# Patient Record
Sex: FEMALE | Race: WHITE | NOT HISPANIC OR LATINO | Employment: OTHER | URBAN - METROPOLITAN AREA
[De-identification: names, ages, dates, MRNs, and addresses within clinical notes are randomized per-mention and may not be internally consistent; named-entity substitution may affect disease eponyms.]

---

## 2017-01-12 ENCOUNTER — GENERIC CONVERSION - ENCOUNTER (OUTPATIENT)
Dept: OTHER | Facility: OTHER | Age: 77
End: 2017-01-12

## 2017-03-22 ENCOUNTER — ALLSCRIPTS OFFICE VISIT (OUTPATIENT)
Dept: OTHER | Facility: OTHER | Age: 77
End: 2017-03-22

## 2017-04-12 ENCOUNTER — ALLSCRIPTS OFFICE VISIT (OUTPATIENT)
Dept: OTHER | Facility: OTHER | Age: 77
End: 2017-04-12

## 2017-04-12 DIAGNOSIS — R60.9 EDEMA: ICD-10-CM

## 2017-04-12 DIAGNOSIS — I25.10 ATHEROSCLEROTIC HEART DISEASE OF NATIVE CORONARY ARTERY WITHOUT ANGINA PECTORIS: ICD-10-CM

## 2017-04-12 DIAGNOSIS — E78.5 HYPERLIPIDEMIA: ICD-10-CM

## 2017-04-12 DIAGNOSIS — I10 ESSENTIAL (PRIMARY) HYPERTENSION: ICD-10-CM

## 2017-04-12 DIAGNOSIS — E11.9 TYPE 2 DIABETES MELLITUS WITHOUT COMPLICATIONS (HCC): ICD-10-CM

## 2017-07-06 ENCOUNTER — GENERIC CONVERSION - ENCOUNTER (OUTPATIENT)
Dept: OTHER | Facility: OTHER | Age: 77
End: 2017-07-06

## 2017-10-04 ENCOUNTER — APPOINTMENT (OUTPATIENT)
Dept: LAB | Facility: CLINIC | Age: 77
End: 2017-10-04
Payer: COMMERCIAL

## 2017-10-04 ENCOUNTER — GENERIC CONVERSION - ENCOUNTER (OUTPATIENT)
Dept: OTHER | Facility: OTHER | Age: 77
End: 2017-10-04

## 2017-10-04 ENCOUNTER — TRANSCRIBE ORDERS (OUTPATIENT)
Dept: LAB | Facility: CLINIC | Age: 77
End: 2017-10-04

## 2017-10-04 DIAGNOSIS — I10 ESSENTIAL (PRIMARY) HYPERTENSION: ICD-10-CM

## 2017-10-04 DIAGNOSIS — R60.9 EDEMA: ICD-10-CM

## 2017-10-04 DIAGNOSIS — I25.10 ATHEROSCLEROTIC HEART DISEASE OF NATIVE CORONARY ARTERY WITHOUT ANGINA PECTORIS: ICD-10-CM

## 2017-10-04 DIAGNOSIS — E11.9 TYPE 2 DIABETES MELLITUS WITHOUT COMPLICATIONS (HCC): ICD-10-CM

## 2017-10-04 DIAGNOSIS — E78.5 HYPERLIPIDEMIA: ICD-10-CM

## 2017-10-04 LAB
ALBUMIN SERPL BCP-MCNC: 3.4 G/DL (ref 3.5–5)
ALP SERPL-CCNC: 67 U/L (ref 46–116)
ALT SERPL W P-5'-P-CCNC: 34 U/L (ref 12–78)
ANION GAP SERPL CALCULATED.3IONS-SCNC: 11 MMOL/L (ref 4–13)
AST SERPL W P-5'-P-CCNC: 38 U/L (ref 5–45)
BASOPHILS # BLD AUTO: 0.05 THOUSANDS/ΜL (ref 0–0.1)
BASOPHILS NFR BLD AUTO: 1 % (ref 0–1)
BILIRUB SERPL-MCNC: 0.48 MG/DL (ref 0.2–1)
BUN SERPL-MCNC: 14 MG/DL (ref 5–25)
CALCIUM SERPL-MCNC: 8.7 MG/DL (ref 8.3–10.1)
CHLORIDE SERPL-SCNC: 103 MMOL/L (ref 100–108)
CHOLEST SERPL-MCNC: 213 MG/DL (ref 50–200)
CO2 SERPL-SCNC: 24 MMOL/L (ref 21–32)
CREAT SERPL-MCNC: 0.9 MG/DL (ref 0.6–1.3)
CREAT UR-MCNC: 296 MG/DL
EOSINOPHIL # BLD AUTO: 0.36 THOUSAND/ΜL (ref 0–0.61)
EOSINOPHIL NFR BLD AUTO: 4 % (ref 0–6)
ERYTHROCYTE [DISTWIDTH] IN BLOOD BY AUTOMATED COUNT: 13.5 % (ref 11.6–15.1)
EST. AVERAGE GLUCOSE BLD GHB EST-MCNC: 183 MG/DL
GFR SERPL CREATININE-BSD FRML MDRD: 63 ML/MIN/1.73SQ M
GLUCOSE P FAST SERPL-MCNC: 135 MG/DL (ref 65–99)
HBA1C MFR BLD: 8 % (ref 4.2–6.3)
HCT VFR BLD AUTO: 39.7 % (ref 34.8–46.1)
HDLC SERPL-MCNC: 39 MG/DL (ref 40–60)
HGB BLD-MCNC: 12.7 G/DL (ref 11.5–15.4)
LDLC SERPL CALC-MCNC: 112 MG/DL (ref 0–100)
LYMPHOCYTES # BLD AUTO: 2.47 THOUSANDS/ΜL (ref 0.6–4.47)
LYMPHOCYTES NFR BLD AUTO: 30 % (ref 14–44)
MCH RBC QN AUTO: 30.1 PG (ref 26.8–34.3)
MCHC RBC AUTO-ENTMCNC: 32 G/DL (ref 31.4–37.4)
MCV RBC AUTO: 94 FL (ref 82–98)
MICROALBUMIN UR-MCNC: 39.3 MG/L (ref 0–20)
MICROALBUMIN/CREAT 24H UR: 13 MG/G CREATININE (ref 0–30)
MONOCYTES # BLD AUTO: 0.94 THOUSAND/ΜL (ref 0.17–1.22)
MONOCYTES NFR BLD AUTO: 12 % (ref 4–12)
NEUTROPHILS # BLD AUTO: 4.34 THOUSANDS/ΜL (ref 1.85–7.62)
NEUTS SEG NFR BLD AUTO: 53 % (ref 43–75)
NRBC BLD AUTO-RTO: 0 /100 WBCS
PLATELET # BLD AUTO: 254 THOUSANDS/UL (ref 149–390)
PMV BLD AUTO: 11.5 FL (ref 8.9–12.7)
POTASSIUM SERPL-SCNC: 4.2 MMOL/L (ref 3.5–5.3)
PROT SERPL-MCNC: 7.3 G/DL (ref 6.4–8.2)
RBC # BLD AUTO: 4.22 MILLION/UL (ref 3.81–5.12)
SODIUM SERPL-SCNC: 138 MMOL/L (ref 136–145)
TRIGL SERPL-MCNC: 311 MG/DL
TSH SERPL DL<=0.05 MIU/L-ACNC: 2.92 UIU/ML (ref 0.36–3.74)
WBC # BLD AUTO: 8.19 THOUSAND/UL (ref 4.31–10.16)

## 2017-10-04 PROCEDURE — 36415 COLL VENOUS BLD VENIPUNCTURE: CPT

## 2017-10-04 PROCEDURE — 82570 ASSAY OF URINE CREATININE: CPT

## 2017-10-04 PROCEDURE — 82043 UR ALBUMIN QUANTITATIVE: CPT

## 2017-10-04 PROCEDURE — 84443 ASSAY THYROID STIM HORMONE: CPT

## 2017-10-04 PROCEDURE — 80061 LIPID PANEL: CPT

## 2017-10-04 PROCEDURE — 85025 COMPLETE CBC W/AUTO DIFF WBC: CPT

## 2017-10-04 PROCEDURE — 80053 COMPREHEN METABOLIC PANEL: CPT

## 2017-10-04 PROCEDURE — 83036 HEMOGLOBIN GLYCOSYLATED A1C: CPT

## 2017-10-18 ENCOUNTER — ALLSCRIPTS OFFICE VISIT (OUTPATIENT)
Dept: OTHER | Facility: OTHER | Age: 77
End: 2017-10-18

## 2017-10-21 NOTE — PROGRESS NOTES
Assessment  1  Arteriosclerotic coronary artery disease (414 00) (I25 10)   2  Hyperlipidemia (272 4) (E78 5)   3  Hypertension (401 9) (I10)   4  Type 2 diabetes mellitus (250 00) (E11 9)   5  Low back pain (724 2) (M54 5)    Plan  Type 2 diabetes mellitus    · (1) BASIC METABOLIC PROFILE; Status:Active; Requested [id number];    · (1) HEMOGLOBIN A1C; Status:Active; Requested [id number]; Discussion/Summary    1  DMII - I reviewed with pt  We discussed treatment options  Patient refusing medications at present and would like to retry diet  I will have patient go for repeat labs in 3 months  If still elevated, patient will follow up to discuss medications  2  Hypertension - continue present medications  3  Hyperlipidemia - continue present treatment  Recheck 6 months low back pain - chronic  We discussed physical therapy and other modalities  Patient will continue follow-up with Orthopedics  5  coronary artery disease - patient overdue to see Cardiology  We will continue present medications  Health maintenance - patient to schedule appointment with eye doctor  Patient refusing mammogram, colonoscopy and other health maintenance testing  Patient understands risks  Recheck as above  Patient call for problems or concerns in the interim  The patient was counseled regarding diagnostic results,-- instructions for management,-- risk factor reductions,-- prognosis,-- patient and family education,-- impressions,-- risks and benefits of treatment options,-- importance of compliance with treatment  Possible side effects of new medications were reviewed with the patient/guardian today  The treatment plan was reviewed with the patient/guardian  The patient/guardian understands and agrees with the treatment plan      Chief Complaint  6 MONTH CK      History of Present Illness  as above - f/u multiple med issues  pt feels well   Occ upper R lateral hip pain (pt s/p THR) but it is not like the pain she had prior to surgery  To see ortho in [address] [facility] [address]  Emanuel Lee up to date with Cardio  Pt denies CP, palp, lightheadedness or other CV symptoms with or without exertionpt not checking home BGs and is not watching her diet  i reviewed recent labs - A1C = 8 0  LDL = 112 and microalbumen was sl elevated  Still with low back pain which limits her ability to exercise  over due for eye exam  Refuses mammogram, colonoscopy, dexa  Review of Systems    Constitutional: as noted in HPI  Eyes: No complaints of eye pain, no red eyes, no eyesight problems, no discharge, no dry eyes, no itching of eyes  ENT: no complaints of earache, no loss of hearing, no nose bleeds, no nasal discharge, no sore throat, no hoarseness  Cardiovascular: lower extremity edema, but-- No complaints of slow heart rate, no fast heart rate, no chest pain, no palpitations, no leg claudication, no lower extremity edema,-- as noted in HPI,-- no chest pain,-- no intermittent leg claudication-- and-- no palpitations  Respiratory: No complaints of shortness of breath, no wheezing, no cough, no SOB on exertion, no orthopnea, no PND  Gastrointestinal: No complaints of abdominal pain, no constipation, no nausea or vomiting, no diarrhea, no bloody stools  Genitourinary: No complaints of dysuria, no incontinence, no pelvic pain, no dysmenorrhea, no vaginal discharge or bleeding  Musculoskeletal: as noted in HPI  Integumentary: No complaints of skin rash or lesions, no itching, no skin wounds, no breast pain or lump  Neurological: No complaints of headache, no confusion, no convulsions, no numbness, no dizziness or fainting, no tingling, no limb weakness, no difficulty walking  Psychiatric: Not suicidal, no sleep disturbance, no anxiety or depression, no change in personality, no emotional problems  Endocrine: No complaints of proptosis, no hot flashes, no muscle weakness, no deepening of the voice, no feelings of weakness     Hematologic/Lymphatic: No complaints of swollen glands, no swollen glands in the neck, does not bleed easily, does not bruise easily  Preventive Quality 65 and Older: Falls Risk: The patient fell 0 times in the past 12 months  The patient is currently asymptomatic Symptoms Include:  Associated symptoms:  No associated symptoms are reported  The patient currently has no urinary incontinence symptoms  Urinary Incontinence Symptoms includes: urinary incontinence    WHEN COUGHING ,HARD LAUGHING  Active Problems  1  Acute pharyngitis (462) (J02 9)   2  Allergic rhinitis (477 9) (J30 9)   3  Arteriosclerotic coronary artery disease (414 00) (I25 10)   4  Arthritis (716 90) (M19 90)   5  Bereavement, uncomplicated (M24 52) (N56 1)   6  Blepharitis, unspecified laterality   7  Bone spur (726 91) (M77 9)   8  Cellulitis (682 9) (L03 90)   9  Chronic pain of right knee (943 63,844 45) (M25 561,G89 29)   10  Edema (782 3) (R60 9)   11  Encounter for screening colonoscopy (V76 51) (Z12 11)   12  Encounter for screening mammogram for breast cancer (V76 12) (Z12 31)   13  Esophageal reflux (530 81) (K21 9)   14  Heart disease (429 9) (I51 9)   15  Hyperglycemia (790 29) (R73 9)   16  Hyperlipidemia (272 4) (E78 5)   17  Hypertension (401 9) (I10)   18  Leg wound, right (891 0) (S81 801A)   19  Low back pain (724 2) (M54 5)   20  Lymphedema of both lower extremities (457 1) (I89 0)   21  Medicare annual wellness visit, initial (V70 0) (Z00 00)   22  Morbid obesity, unspecified obesity type (278 01) (E66 01)   23  Osteoarthritis, localized, knee (715 36) (M17 10)   24  Screening for genitourinary condition (V81 6) (Z13 89)   25  Screening for neurological condition (V80 09) (Z13 89)   26  Type 2 diabetes mellitus (250 00) (E11 9)    Past Medical History  1  History of Acute Myocardial Infarction (V12 59)    The active problems and past medical history were reviewed and updated today  Surgical History  1  History of Cath Stent 1 Type Drug-Eluting   2  History of Hip Surgery    The surgical history was reviewed and updated today  Family History  Mother    1  Family history of Coronary Artery Disease (V17 49)   2  Family history of Diabetes Mellitus (V18 0)   3  Family history of Heart Disease (V17 49)   4  Family history of Hypertension (V17 49)   5  Family history of Type 2 Diabetes Mellitus  Sister    6  Family history of Coronary Artery Disease (V17 49)   7  Family history of Diabetes Mellitus (V18 0)   8  Family history of Diabetes Mellitus (V18 0)   9  Family history of Type 2 Diabetes Mellitus   10  Family history of Type 2 Diabetes Mellitus  Brother    6  Family history of Diabetes Mellitus (V18 0)   12  Family history of Type 2 Diabetes Mellitus  Family History    13  Family history of Type 2 Diabetes Mellitus    Social History   · Denied: History of Alcohol Use (History)   · Bereavement, uncomplicated (P92 61) (A79 9)   · Daily Tea Consumption (___ Cups/Day)   · Marital History -  (V61 03)   · Never A Smoker   · Never Drank Alcohol   · Retired From Work  The social history was reviewed and updated today  Current Meds   1  AmLODIPine Besylate 5 MG Oral Tablet; TAKE 1 TABLET DAILY; Therapy: [id number] to (Nestora Barthel)  Requested for: 28Apr2017; Last   Rx:28Apr2017 Ordered   2  Aspirin 81 MG TABS; TAKE 1 TABLET DAILY; Therapy: [id number] to (Evaluate:15Apr2017); Last Rx:16Mar2016 Ordered   3  HydroCHLOROthiazide 25 MG Oral Tablet; TAKE ONE (1) TABLET(S) DAILY  Requested   for: 45FAE9744; Last Rx:[id number] Ordered   4  Lisinopril 40 MG Oral Tablet; TAKE 1 TABLET DAILY; Therapy: 92SFL8404 to (Nestora Barthel)  Requested for: 28Apr2017; Last   Rx:28Apr2017 Ordered   5  Metoprolol Succinate  MG Oral Tablet Extended Release 24 Hour; TAKE 1 TABLET   ONCE DAILY; Therapy: [id number] to (Flaquitaora Barthel)  Requested for: 28Apr2017; Last   Rx:28Apr2017 Ordered   6   Multivitamin/Fluoride 1 MG Oral Tablet Chewable; CHEW AND SWALLOW 1 TABLET   DAILY Recorded   7  Vitamin C 500 MG Oral Tablet; TAKE 1 TABLET DAILY  Requested for: 28Apr2017; Last   Rx:28Apr2017 Ordered    The medication list was reviewed and updated today  Allergies  1  Sulfa Drugs    Vitals  Vital Signs    Recorded: 21AMZ6267 09:48AM   Temperature 98 2 F   Heart Rate 72   Respiration 18   Systolic 113   Diastolic 62   Height 4 ft 11 in   Weight 243 lb 8 oz   BMI Calculated 49 18   BSA Calculated 2     Physical Exam    Socks and shoes removed, Right Foot Findings: normal foot, no swelling, no erythema  The right toes were normal  Normal tactile sensation with monofilament testing throughout the right foot  Socks and shoes removed, Left Foot Findings: normal foot, no swelling, no erythema  The left toes were normal  Normal tactile sensation with monofilament testing throughout the left foot  Pulses:   1+ in the dorsalis pedis on the right  Pulses:   1+ in the dorsalis pedis on the left  Assign Risk Category: 0: No loss of protective sensation, no deformity  No present risk  Constitutional   General appearance: No acute distress, well appearing and well nourished  Head and Face   Head and face: Normal     Eyes   Conjunctiva and lids: No swelling, erythema or discharge  Pupils and irises: Equal, round, reactive to light  Ophthalmoscopic examination: Abnormal  -- unable to appreciate fundi  Ears, Nose, Mouth, and Throat   External inspection of ears and nose: Normal     Otoscopic examination: Tympanic membranes translucent with normal light reflex  Canals patent without erythema  Nasal mucosa, septum, and turbinates: Normal without edema or erythema  Lips, teeth, and gums: Normal, good dentition  Oropharynx: Normal with no erythema, edema, exudate or lesions  Neck   Neck: Supple, symmetric, trachea midline, no masses  Thyroid: Normal, no thyromegaly      Pulmonary   Respiratory effort: No increased work of breathing or signs of respiratory distress  Auscultation of lungs: Clear to auscultation  Cardiovascular   Auscultation of heart: Normal rate and rhythm, normal S1 and S2, no murmurs  Carotid pulses: 2+ bilaterally  Abdominal aorta: Normal     Pedal pulses: Abnormal  -- decreased pedal pulses  Peripheral vascular exam: Normal     Examination of extremities for edema and/or varicosities: Abnormal  -- trace edema  Abdomen   Abdomen: Non-tender, no masses  Liver and spleen: No hepatomegaly or splenomegaly  Lymphatic   Palpation of lymph nodes in neck: No lymphadenopathy  Palpation of lymph nodes in other areas: No lymphadenopathy  Musculoskeletal   Gait and station: Abnormal  -- antalgic gait - ambulates with cane  Digits and nails: Normal without clubbing or cyanosis  Joints, bones, and muscles: Abnormal  -- Slight tenderness to palpation over the lower lumbar spine  No CVA tenderness  SLR causes sl increased low back pain  Muscle strength/tone: Normal     Skin   Skin and subcutaneous tissue: Normal without rashes or lesions  Neurologic   Cranial nerves: Cranial nerves II-XII intact  Cortical function: Normal mental status  Sensation: No sensory loss  Psychiatric   Mood and affect: Normal        Health Management  Health Maintenance   Medicare Annual Wellness Visit; every 1 year; Next Due: [id number]; Overdue    Future Appointments    Date/Time Provider Specialty Site   05/15/2018 10:15 AM RICHARD Rodríguez   [facility]     Signatures   Electronically signed by : RICHARD Morales ; Oct 20 2017  7:21AM EST                       (Author)

## 2017-12-06 ENCOUNTER — GENERIC CONVERSION - ENCOUNTER (OUTPATIENT)
Dept: CARDIOLOGY CLINIC | Facility: HOSPITAL | Age: 77
End: 2017-12-06

## 2018-01-04 DIAGNOSIS — E11.9 TYPE 2 DIABETES MELLITUS WITHOUT COMPLICATIONS (HCC): ICD-10-CM

## 2018-01-05 ENCOUNTER — APPOINTMENT (OUTPATIENT)
Dept: LAB | Facility: CLINIC | Age: 78
End: 2018-01-05
Payer: COMMERCIAL

## 2018-01-05 ENCOUNTER — TRANSCRIBE ORDERS (OUTPATIENT)
Dept: LAB | Facility: CLINIC | Age: 78
End: 2018-01-05

## 2018-01-05 DIAGNOSIS — E11.9 TYPE 2 DIABETES MELLITUS WITHOUT COMPLICATIONS (HCC): ICD-10-CM

## 2018-01-05 LAB
ANION GAP SERPL CALCULATED.3IONS-SCNC: 10 MMOL/L (ref 4–13)
BUN SERPL-MCNC: 14 MG/DL (ref 5–25)
CALCIUM SERPL-MCNC: 9.5 MG/DL (ref 8.3–10.1)
CHLORIDE SERPL-SCNC: 97 MMOL/L (ref 100–108)
CO2 SERPL-SCNC: 28 MMOL/L (ref 21–32)
CREAT SERPL-MCNC: 0.87 MG/DL (ref 0.6–1.3)
EST. AVERAGE GLUCOSE BLD GHB EST-MCNC: 194 MG/DL
GFR SERPL CREATININE-BSD FRML MDRD: 65 ML/MIN/1.73SQ M
GLUCOSE P FAST SERPL-MCNC: 140 MG/DL (ref 65–99)
HBA1C MFR BLD: 8.4 % (ref 4.2–6.3)
POTASSIUM SERPL-SCNC: 4 MMOL/L (ref 3.5–5.3)
SODIUM SERPL-SCNC: 135 MMOL/L (ref 136–145)

## 2018-01-05 PROCEDURE — 80048 BASIC METABOLIC PNL TOTAL CA: CPT

## 2018-01-05 PROCEDURE — 36415 COLL VENOUS BLD VENIPUNCTURE: CPT

## 2018-01-05 PROCEDURE — 83036 HEMOGLOBIN GLYCOSYLATED A1C: CPT

## 2018-01-06 ENCOUNTER — GENERIC CONVERSION - ENCOUNTER (OUTPATIENT)
Dept: OTHER | Facility: OTHER | Age: 78
End: 2018-01-06

## 2018-01-09 ENCOUNTER — ALLSCRIPTS OFFICE VISIT (OUTPATIENT)
Dept: OTHER | Facility: OTHER | Age: 78
End: 2018-01-09

## 2018-01-10 NOTE — PROGRESS NOTES
Assessment   1  Preoperative clearance (V72 84) (Z01 818)   2  Hypertension (401 9) (I10)   3  Hyperlipidemia (272 4) (E78 5)   4  Type 2 diabetes mellitus (250 00) (E11 9)   5  Arteriosclerotic coronary artery disease (414 00) (I25 10)   6  Esophageal reflux (530 81) (K21 9)    Plan   Arteriosclerotic coronary artery disease, Esophageal reflux, Hyperlipidemia,    Hypertension, Preoperative clearance, Type 2 diabetes mellitus    · (1) CBC/PLT/DIFF; Status:Active; Requested for:05Apr2018;    · (1) COMPREHENSIVE METABOLIC PANEL; Status:Active; Requested for:05Apr2018;    · (1) HEMOGLOBIN A1C; Status:Active; Requested for:05Apr2018;    · (1) LIPID PANEL, FASTING; Status:Active; Requested for:05Apr2018;    · (1) MICROALBUMIN CREATININE RATIO, RANDOM URINE; Status:Active; Requested    for:05Apr2018;    · (1) TSH; Status:Active; Requested for:05Apr2018;   Type 2 diabetes mellitus    · MetFORMIN HCl - 500 MG Oral Tablet; Take 1 tablet twice a day   · *VB - Foot Exam; Status:Complete;   Done: 50KBU4928 12:00AM    Discussion/Summary   Surgical Clearance: She is at a LOW risk from a cardiovascular standpoint at this time without any additional cardiac testing  Reevaluation needed, if she should present with symptoms prior to surgery/procedure  Surgical clearance faxed to Dr Sima Edge   #1 Preop Clearance R cataract DMII HTN hyperlipidemia GERD CAD I reviewed with pt  PT to see Cardio later this week  I eric clear pt pending CArdio eval  BGs poorly controlled - add Metformin 500mg bid  Urged dietary compliance  Recheck labs in April and f/u 3m  Pt to call for problems or concerns in the interim  The patient was counseled regarding diagnostic results,-- instructions for management,-- risk factor reductions,-- prognosis,-- patient and family education,-- impressions,-- risks and benefits of treatment options,-- importance of compliance with treatment      Possible side effects of new medications were reviewed with the patient/guardian today  The treatment plan was reviewed with the patient/guardian  The patient/guardian understands and agrees with the treatment plan      Chief Complaint   pre op clearance: right eye cataract 1/18/17 Aishwarya           History of Present Illness   Pre-Op Visit (Brief): The patient is being seen for a preoperative visit-- and-- R cataract (Dr Keon Posada)  Surgical Risk Assessment:      Prior Anesthesia: She had prior anesthesia-- and-- no prior adverse reaction to general anesthesia  Pertinent Past Medical History: DMII, CAD, HTN, hyperlipidemia  Exercise Capacity: able to walk four blocks without symptoms-- and-- able to walk two flights of stairs without symptoms  Lifestyle Factors: denies alcohol use, denies tobacco use and denies illegal drug use  Symptoms: no easy bleeding,-- no easy bruising,-- no frequent nosebleeds,-- no chest pain,-- no cough,-- no dyspnea,-- no edema,-- no palpitations-- and-- no wheezing  HPI: as above pt states that she fell off of her diabetic diet over the holidays  A1C has increased - recently = 8 4   pt denies CP, palp, lightheadedness ari ther CV symptoms no GI or  complaints still with chronic low back issues - possibly sl worse  No change in bowel or bladder  No leg weakness      Review of Systems        Constitutional: as noted in HPI  Eyes: as noted in HPI       ENT: no complaints of earache, no loss of hearing, no nose bleeds, no nasal discharge, no sore throat, no hoarseness  Cardiovascular: No complaints of slow heart rate, no fast heart rate, no chest pain, no palpitations, no leg claudication, no lower extremity edema  Respiratory: No complaints of shortness of breath, no wheezing, no cough, no SOB on exertion, no orthopnea, no PND  Gastrointestinal: No complaints of abdominal pain, no constipation, no nausea or vomiting, no diarrhea, no bloody stools        Genitourinary: No complaints of dysuria, no incontinence, no pelvic pain, no dysmenorrhea, no vaginal discharge or bleeding  Musculoskeletal: as noted in HPI  Integumentary: No complaints of skin rash or lesions, no itching, no skin wounds, no breast pain or lump  Neurological: as noted in HPI  Psychiatric: Not suicidal, no sleep disturbance, no anxiety or depression, no change in personality, no emotional problems  Endocrine: No complaints of proptosis, no hot flashes, no muscle weakness, no deepening of the voice, no feelings of weakness  Hematologic/Lymphatic: No complaints of swollen glands, no swollen glands in the neck, does not bleed easily, does not bruise easily  Preventive Quality 65 and Older: Falls Risk: The patient fell 0 times in the past 12 months  The patient is currently experiencing urinary symptoms  Urinary Incontinence Symptoms includes: urinary incontinence       Active Problems   1  Acute pharyngitis (462) (J02 9)   2  Allergic rhinitis (477 9) (J30 9)   3  Arteriosclerotic coronary artery disease (414 00) (I25 10)   4  Arthritis (716 90) (M19 90)   5  Bereavement, uncomplicated (H56 08) (U98 2)   6  Blepharitis, unspecified laterality   7  Bone spur (726 91) (M77 9)   8  Cellulitis (682 9) (L03 90)   9  Chronic pain of right knee (351 25,799 60) (M25 561,G89 29)   10  Edema (782 3) (R60 9)   11  Encounter for screening colonoscopy (V76 51) (Z12 11)   12  Encounter for screening mammogram for breast cancer (V76 12) (Z12 31)   13  Esophageal reflux (530 81) (K21 9)   14  Heart disease (429 9) (I51 9)   15  Hyperglycemia (790 29) (R73 9)   16  Hyperlipidemia (272 4) (E78 5)   17  Hypertension (401 9) (I10)   18  Leg wound, right (891 0) (S81 801A)   19  Low back pain (724 2) (M54 5)   20  Lymphedema of both lower extremities (457 1) (I89 0)   21  Medicare annual wellness visit, initial (V70 0) (Z00 00)   22  Morbid obesity, unspecified obesity type (278 01) (E66 01)   23  Osteoarthritis, localized, knee (715 36) (M17 10)   24  Screening for genitourinary condition (V81 6) (Z13 89)   25  Screening for neurological condition (V80 09) (Z13 89)   26  Type 2 diabetes mellitus (250 00) (E11 9)    Past Medical History    · History of Acute Myocardial Infarction (V12 59)     The active problems and past medical history were reviewed and updated today  Surgical History    · History of Cath Stent 1 Type Drug-Eluting   · History of Hip Surgery     The surgical history was reviewed and updated today  Family History   Mother    · Family history of Coronary Artery Disease (V17 49)   · Family history of Diabetes Mellitus (V18 0)   · Family history of Heart Disease (V17 49)   · Family history of Hypertension (V17 49)   · Family history of Type 2 Diabetes Mellitus  Sister    · Family history of Coronary Artery Disease (V17 49)   · Family history of Diabetes Mellitus (V18 0)   · Family history of Diabetes Mellitus (V18 0)   · Family history of Type 2 Diabetes Mellitus   · Family history of Type 2 Diabetes Mellitus  Brother    · Family history of Diabetes Mellitus (V18 0)   · Family history of Type 2 Diabetes Mellitus  Family History    · Family history of Type 2 Diabetes Mellitus    Social History    · Denied: History of Alcohol Use (History)   · Bereavement, uncomplicated (D00 42) (Z78 5)   · Daily Tea Consumption (___ Cups/Day)   · Marital History -  (V61 03)   · Never A Smoker   · Never Drank Alcohol   · Retired From Work  The social history was reviewed and updated today  Current Meds    1  AmLODIPine Besylate 5 MG Oral Tablet; TAKE 1 TABLET DAILY; Therapy: 68RPJ6595 to (Mekhi Reyes)  Requested for: 28Apr2017; Last     Rx:28Apr2017 Ordered   2  Aspirin 81 MG TABS; TAKE 1 TABLET DAILY; Therapy: 79NKT4012 to (Evaluate:07Ajn2307); Last Rx:16Mar2016 Ordered   3  HydroCHLOROthiazide 25 MG Oral Tablet; TAKE ONE (1) TABLET(S) DAILY  Requested     for: 73LXW3013; Last Rx:35Cbd3570 Ordered   4   Lisinopril 40 MG Oral Tablet; TAKE 1 TABLET DAILY; Therapy: 30CJA5189 to (0341 59 12 34)  Requested for: 28Apr2017; Last     Rx:28Apr2017 Ordered   5  Metoprolol Succinate  MG Oral Tablet Extended Release 24 Hour; TAKE 1     TABLET ONCE DAILY; Therapy: 27SNC3463 to (0341 59 12 34)  Requested for: 28Apr2017; Last     Rx:28Apr2017 Ordered   6  Multivitamin/Fluoride 1 MG Oral Tablet Chewable; CHEW AND SWALLOW 1 TABLET     DAILY Recorded   7  Vitamin C 500 MG Oral Tablet; TAKE 1 TABLET DAILY  Requested for: 28Apr2017; Last     Rx:28Apr2017 Ordered     The medication list was reviewed and updated today  Allergies   1  Sulfa Drugs    Vitals    Recorded: 97NMU2496 01:33PM   Temperature 97 2 F   Heart Rate 70   Systolic 613   Diastolic 80   Height 4 ft 11 in   Weight 245 lb    BMI Calculated 49 48   BSA Calculated 2 01     Physical Exam   Socks and shoes removed, Right Foot Findings: normal foot  The toes were normal-- and-- had full range of motion  Socks and Shoes removed, Left Foot Findings: normal foot  The toes were normal-- and-- had full range of motion  Monofilament Testing: normal tactile sensation with monofilament testing throughout both feet  Vascular: Capillary refills findings on the right were normal in the toes  Pulses: 1+ in the dorsalis pedis  Capillary refills findings on the left were normal in the toes  Pulses: 1+ in the dorsalis pedis  Assign Risk Category: 0: No loss of protective sensation, no deformity  No present risk  Constitutional      General appearance: No acute distress, well appearing and well nourished  Head and Face      Head and face: Normal        Eyes      Conjunctiva and lids: No swelling, erythema or discharge  Pupils and irises: Equal, round, reactive to light  Ophthalmoscopic examination: Abnormal  -- R cataract        Ears, Nose, Mouth, and Throat      External inspection of ears and nose: Normal        Otoscopic examination: Tympanic membranes translucent with normal light reflex  Canals patent without erythema  Nasal mucosa, septum, and turbinates: Normal without edema or erythema  Lips, teeth, and gums: Normal, good dentition  Oropharynx: Normal with no erythema, edema, exudate or lesions  Neck      Neck: Supple, symmetric, trachea midline, no masses  Pulmonary      Respiratory effort: No increased work of breathing or signs of respiratory distress  Auscultation of lungs: Clear to auscultation  Cardiovascular      Auscultation of heart: Normal rate and rhythm, normal S1 and S2, no murmurs  Carotid pulses: 2+ bilaterally  Abdominal aorta: Normal        Pedal pulses: Abnormal  -- decreased pedal pulses  Peripheral vascular exam: Normal        Examination of extremities for edema and/or varicosities: Abnormal  -- trace edema - leg wraps in place  Abdomen      Abdomen: Non-tender, no masses  Liver and spleen: No hepatomegaly or splenomegaly  Lymphatic      Palpation of lymph nodes in neck: No lymphadenopathy  Palpation of lymph nodes in other areas: No lymphadenopathy  Musculoskeletal      Gait and station: Abnormal  -- antalgic gait - ambulates with cane  Digits and nails: Normal without clubbing or cyanosis  Joints, bones, and muscles: Abnormal  -- Slight tenderness to palpation over the lower lumbar spine  No CVA tenderness  SLR causes sl increased low back pain  Muscle strength/tone: Normal        Skin      Skin and subcutaneous tissue: Normal without rashes or lesions  Neurologic      Cranial nerves: Cranial nerves II-XII intact  Cortical function: Normal mental status  Sensation: No sensory loss         Results/Data   PHQ-2 Adult Depression Screening 41MOQ5816 01:35PM User, Sandra      Test Name Result Flag Reference   PHQ-2 Adult Depression Score 0     Over the last two weeks, how often have you been bothered by any of the following problems? Little interest or pleasure in doing things: Not at all - 0     Feeling down, depressed, or hopeless: Not at all - 0   PHQ-2 Adult Depression Screening Negative          End of Encounter Meds   1  Aspirin 81 MG TABS; TAKE 1 TABLET DAILY; Therapy: 54HMY1563 to (Evaluate:15Apr2017); Last Rx:16Mar2016 Ordered   2  Metoprolol Succinate  MG Oral Tablet Extended Release 24 Hour; TAKE 1     TABLET ONCE DAILY; Therapy: 78RFY5603 to (0341 59 12 34)  Requested for: 28Apr2017; Last     Rx:28Apr2017 Ordered  3  AmLODIPine Besylate 5 MG Oral Tablet; TAKE 1 TABLET DAILY; Therapy: 75DDL2545 to (0341 59 12 34)  Requested for: 28Apr2017; Last     Rx:28Apr2017 Ordered   4  HydroCHLOROthiazide 25 MG Oral Tablet; TAKE ONE (1) TABLET(S) DAILY  Requested     for: 44SHV8738; Last Rx:93Knb2968 Ordered   5  Lisinopril 40 MG Oral Tablet; TAKE 1 TABLET DAILY; Therapy: 82OTP8128 to (0341 59 12 34)  Requested for: 28Apr2017; Last     Rx:28Apr2017 Ordered  6  Vitamin C 500 MG Oral Tablet; TAKE 1 TABLET DAILY  Requested for: 12DVS4915; Last     Rx:28Apr2017 Ordered  7  MetFORMIN HCl - 500 MG Oral Tablet; Take 1 tablet twice a day; Therapy: 38BIC6491 to (Aubrey Mckoy)  Requested for: 32AJO9898; Last     Rx:09Jan2018 Ordered  8  Multivitamin/Fluoride 1 MG Oral Tablet Chewable; CHEW AND SWALLOW 1 TABLET     DAILY Recorded    Future Appointments      Date/Time Provider Specialty Site   01/12/2018 08:00 AM RICHARD Pyle  Cardiology [facility] CARDIOLOGY [place]   05/15/2018 10:15 AM RICHARD Tiwari   [address]     Signatures    Electronically signed by : RICHARD Dickson ; Jan 9 2018  8:42PM EST                       (Author)

## 2018-01-12 ENCOUNTER — ALLSCRIPTS OFFICE VISIT (OUTPATIENT)
Dept: OTHER | Facility: OTHER | Age: 78
End: 2018-01-12

## 2018-01-12 ENCOUNTER — GENERIC CONVERSION - ENCOUNTER (OUTPATIENT)
Dept: OTHER | Facility: OTHER | Age: 78
End: 2018-01-12

## 2018-01-12 VITALS
HEIGHT: 59 IN | TEMPERATURE: 97.7 F | RESPIRATION RATE: 18 BRPM | SYSTOLIC BLOOD PRESSURE: 126 MMHG | BODY MASS INDEX: 48.99 KG/M2 | WEIGHT: 243 LBS | HEART RATE: 87 BPM | DIASTOLIC BLOOD PRESSURE: 78 MMHG

## 2018-01-13 VITALS
TEMPERATURE: 98.2 F | BODY MASS INDEX: 49.09 KG/M2 | RESPIRATION RATE: 18 BRPM | DIASTOLIC BLOOD PRESSURE: 62 MMHG | HEIGHT: 59 IN | SYSTOLIC BLOOD PRESSURE: 132 MMHG | WEIGHT: 243.5 LBS | HEART RATE: 72 BPM

## 2018-01-14 VITALS
SYSTOLIC BLOOD PRESSURE: 120 MMHG | WEIGHT: 235 LBS | BODY MASS INDEX: 47.37 KG/M2 | HEIGHT: 59 IN | HEART RATE: 80 BPM | DIASTOLIC BLOOD PRESSURE: 68 MMHG

## 2018-01-15 NOTE — MISCELLANEOUS
Rob Wellington   [address] [place] [address] [address] [facility]  fax [phone]     Dear Rob Wellington,     In my opinion my patient Deon Kumar is healthy and going to live another 5 years  Please assist her in settling worker comp carpal tunnel injury  If you need any other information feel free to contact me  Sincerely,         Luis Felipe Jarrett MD                                  Electronically signed by:Christopher Bradly Frankel M D    Jul 6 2017  2:59PM EST

## 2018-01-15 NOTE — RESULT NOTES
Verified Results  (1) CBC/PLT/DIFF 14Apr2016 10:19AM Ari Paredes     Test Name Result Flag Reference   WBC COUNT 9 11 Thousand/uL  4 31-10 16   RBC COUNT 4 35 Million/uL  3 81-5 12   HEMOGLOBIN 13 2 g/dL  11 5-15 4   HEMATOCRIT 40 4 %  34 8-46  1   MCV 93 fL  82-98   MCH 30 3 pg  26 8-34 3   MCHC 32 7 g/dL  31 4-37 4   RDW 13 5 %  11 6-15 1   MPV 11 2 fL  8 9-12 7   PLATELET COUNT 917 Thousands/uL  149-390   nRBC AUTOMATED 0 /100 WBCs     NEUTROPHILS RELATIVE PERCENT 53 %  43-75   LYMPHOCYTES RELATIVE PERCENT 29 %  14-44   MONOCYTES RELATIVE PERCENT 11 %  4-12   EOSINOPHILS RELATIVE PERCENT 7 % H 0-6   BASOPHILS RELATIVE PERCENT 0 %  0-1   NEUTROPHILS ABSOLUTE COUNT 4 80 Thousands/µL  1 85-7 62   LYMPHOCYTES ABSOLUTE COUNT 2 66 Thousands/µL  0 60-4 47   MONOCYTES ABSOLUTE COUNT 1 00 Thousand/µL  0 17-1 22   EOSINOPHILS ABSOLUTE COUNT 0 59 Thousand/µL  0 00-0 61   BASOPHILS ABSOLUTE COUNT 0 04 Thousands/µL  0 00-0 10     (1) COMPREHENSIVE METABOLIC PANEL 02LUI0853 34:57WV Luzma Paredes Kidney Disease Education Program recommendations are as follows:  GFR calculation is accurate only with a steady state creatinine  Chronic Kidney disease less than 60 ml/min/1 73 sq  meters  Kidney failure less than 15 ml/min/1 73 sq  meters       Test Name Result Flag Reference   GLUCOSE,RANDM 111 mg/dL     SODIUM 137 mmol/L  136-145   POTASSIUM 4 1 mmol/L  3 5-5 3   CHLORIDE 100 mmol/L  100-108   CARBON DIOXIDE 26 mmol/L  21-32   ANION GAP (CALC) 11 mmol/L  4-13   BLOOD UREA NITROGEN 15 mg/dL  5-25   CREATININE 0 88 mg/dL  0 60-1 30   Standardized to IDMS reference method   CALCIUM 8 8 mg/dL  8 3-10 1   BILI, TOTAL 0 51 mg/dL  0 20-1 00   ALK PHOSPHATAS 75 U/L     ALT (SGPT) 20 U/L  12-78   AST(SGOT) 21 U/L  5-45   ALBUMIN 3 6 g/dL  3 5-5 0   TOTAL PROTEIN 7 2 g/dL  6 4-8 2   eGFR Non-African American      >60 0 ml/min/1 73sq m     (1) TSH 82Bhd0677 10:19AM Heidy Tyler Patients undergoing fluorescein dye angiography may retain small amounts of fluorescein in the body for 48-72 hours post procedure  Samples containing fluorescein can produce falsely depressed TSH values  If the patient had this procedure,a specimen should be resubmitted post fluorescein clearance  The recommended reference ranges for TSH during pregnancy are as follows:  First trimester 0 1 to 2 5 uIU/mL  Second trimester  0 2 to 3 0 uIU/mL  Third trimester 0 3 to 3 0 uIU/m     Test Name Result Flag Reference   TSH 2 710 uIU/mL  0 358-3 740     (1) MICROALBUMIN CREATININE RATIO, RANDOM URINE 14Apr2016 10:19AM Ming Paredes     Test Name Result Flag Reference   MICROALBUMIN/ CREAT R 14 mg/g creatinine  0-30   MICROALBUMIN,URINE 19 3 mg/L  0 0-20 0   CREATININE URINE 141 0 mg/dL       (1) LIPID PANEL, FASTING 14Apr2016 10:19AM Ming Paredes   Triglyceride:         Normal              <150 mg/dl       Borderline High    150-199 mg/dl       High               200-499 mg/dl       Very High          >499 mg/dl  Cholesterol:         Desirable        <200 mg/dl      Borderline High  200-239 mg/dl      High             >239 mg/dl  HDL Cholesterol:        High    >59 mg/dL      Low     <41 mg/dL  LDL CALCULATED:    This screening LDL is a calculated result  It does not have the accuracy of the Direct Measured LDL in the monitoring of patients with hyperlipidemia and/or statin therapy  Direct Measure LDL (MJH742) must be ordered separately in these patients       Test Name Result Flag Reference   CHOLESTEROL 225 mg/dL H    HDL,DIRECT 40 mg/dL  40-60   LDL CHOLESTEROL CALCULATED 125 mg/dL H 0-100   TRIGLYCERIDES 299 mg/dL H <=150     (1) HEMOGLOBIN A1C 14Apr2016 10:19AM Ming Paredes   5 7-6 4% impaired fasting glucose  >=6 5% diagnosis of diabetes    Falsely low levels are seen in conditions linked to short RBC life span-  hemolytic anemia, and splenomegaly  Falsely elevated levels are seen in situations where there is an increased production of RBC- receipt of erythropoietin or blood transfusions  Adopted from ADA-Clinical Practice Recommendations     Test Name Result Flag Reference   HEMOGLOBIN A1C 7 0 % H 4 0-5 6   EST  AVG   GLUCOSE 154 mg/dl

## 2018-01-17 NOTE — RESULT NOTES
Verified Results  (1) CBC/PLT/DIFF 66SQG4544 09:26AM Andre Diaz Order Number: [id number]     Test Name Result Flag Reference   WBC COUNT 8 19 Thousand/uL  4 31-10 16   RBC COUNT 4 22 Million/uL  3 81-5 12   HEMOGLOBIN 12 7 g/dL  11 5-15 4   HEMATOCRIT 39 7 %  34 8-46  1   MCV 94 fL  82-98   MCH 30 1 pg  26 8-34 3   MCHC 32 0 g/dL  31 4-37 4   RDW 13 5 %  11 6-15 1   MPV 11 5 fL  8 9-12 7   PLATELET COUNT 610 Thousands/uL  149-390   nRBC AUTOMATED 0 /100 WBCs     NEUTROPHILS RELATIVE PERCENT 53 %  43-75   LYMPHOCYTES RELATIVE PERCENT 30 %  14-44   MONOCYTES RELATIVE PERCENT 12 %  4-12   EOSINOPHILS RELATIVE PERCENT 4 %  0-6   BASOPHILS RELATIVE PERCENT 1 %  0-1   NEUTROPHILS ABSOLUTE COUNT 4 34 Thousands/? ??L  1 85-7 62   LYMPHOCYTES ABSOLUTE COUNT 2 47 Thousands/? ??L  0 60-4 47   MONOCYTES ABSOLUTE COUNT 0 94 Thousand/? ??L  0 17-1 22   EOSINOPHILS ABSOLUTE COUNT 0 36 Thousand/? ??L  0 00-0 61   BASOPHILS ABSOLUTE COUNT 0 05 Thousands/? ??L  0 00-0 10   - Patient Instructions: This bloodwork is non-fasting  Please drink two glasses of water morning of bloodwork  (1) COMPREHENSIVE METABOLIC PANEL 48REY6605 42:54HR Andre Diaz Order Number: [id number]     Test Name Result Flag Reference   SODIUM 138 mmol/L  136-145   POTASSIUM 4 2 mmol/L  3 5-5 3   CHLORIDE 103 mmol/L  100-108   CARBON DIOXIDE 24 mmol/L  21-32   ANION GAP (CALC) 11 mmol/L  4-13   BLOOD UREA NITROGEN 14 mg/dL  5-25   CREATININE 0 90 mg/dL  0 60-1 30   Standardized to IDMS reference method   CALCIUM 8 7 mg/dL  8 3-10 1   BILI, TOTAL 0 48 mg/dL  0 20-1 00   ALK PHOSPHATAS 67 U/L     ALT (SGPT) 34 U/L  12-78   Specimen collection should occur prior to Sulfasalazine and/or Sulfapyridine administration due to the potential for falsely depressed results     AST(SGOT) 38 U/L  5-45   Specimen collection should occur prior to Sulfasalazine administration due to the potential for falsely depressed results  ALBUMIN 3 4 g/dL L 3 5-5 0   TOTAL PROTEIN 7 3 g/dL  6 4-8 2   eGFR 63 ml/min/1 73sq m     National Kidney Disease Education Program recommendations are as follows:  GFR calculation is accurate only with a steady state creatinine  Chronic Kidney disease less than 60 ml/min/1 73 sq  meters  Kidney failure less than 15 ml/min/1 73 sq  meters  GLUCOSE FASTING 135 mg/dL H 65-99   Specimen collection should occur prior to Sulfasalazine administration due to the potential for falsely depressed results  Specimen collection should occur prior to Sulfapyridine administration due to the potential for falsely elevated results  (1) LIPID PANEL, FASTING 29HVF1077 09:26AM Jody Sawyer Sebastian Order Number: [id number]     Test Name Result Flag Reference   CHOLESTEROL 213 mg/dL H    HDL,DIRECT 39 mg/dL L 40-60   Specimen collection should occur prior to Metamizole administration due to the potential for falsley depressed results  LDL CHOLESTEROL CALCULATED 112 mg/dL H 0-100   - Patient Instructions: This is a fasting blood test  Water,black tea or black  coffee only after 9:00pm the night before test   Drink 2 glasses of water the morning of test       Triglyceride:        Normal <150 mg/dl   Borderline High 150-199 mg/dl   High 200-499 mg/dl   Very High >499 mg/dl      Cholesterol:       Desirable <200 mg/dl    Borderline High 200-239 mg/dl    High >239 mg/dl      HDL Cholesterol:       High>59 mg/dL    Low <41 mg/dL      This screening LDL is a calculated result  It does not have the accuracy of the Direct Measured LDL in the monitoring of patients with hyperlipidemia and/or statin therapy  Direct Measure LDL (VTU968) must be ordered separately in these patients  TRIGLYCERIDES 311 mg/dL H <=150   Specimen collection should occur prior to N-Acetylcysteine or Metamizole administration due to the potential for falsely depressed results       (1) HEMOGLOBIN A1C 77Ppw8487 09:26AM Mraio Lentz Order Number: [id number]     Test Name Result Flag Reference   HEMOGLOBIN A1C 8 0 % H 4 2-6 3   EST  AVG  GLUCOSE 183 mg/dl       (1) MICROALBUMIN CREATININE RATIO, RANDOM URINE 04Oct2017 09:26AM Mario Lentz Order Number: [id number]     Test Name Result Flag Reference   MICROALBUMIN/ CREAT R 13 mg/g creatinine  0-30   MICROALBUMIN,URINE 39 3 mg/L H 0 0-20 0   CREATININE URINE 296 0 mg/dL       (1) TSH 04Oct2017 09:26AM Mario Lentz Order Number: [id number]     Test Name Result Flag Reference   TSH 2 920 uIU/mL  0 358-3 740   - Patient Instructions: This bloodwork is non-fasting  Please drink two glasses of water morning of bloodwork  Patients undergoing fluorescein dye angiography may retain small amounts of fluorescein in the body for 48-72 hours post procedure  Samples containing fluorescein can produce falsely depressed TSH values  If the patient had this procedure,a specimen should be resubmitted post fluorescein clearance            The recommended reference ranges for TSH during pregnancy are as follows:  First trimester 0 1 to 2 5 uIU/mL  Second trimester  0 2 to 3 0 uIU/mL  Third trimester 0 3 to 3 0 uIU/m

## 2018-01-23 VITALS
SYSTOLIC BLOOD PRESSURE: 138 MMHG | TEMPERATURE: 97.2 F | HEART RATE: 70 BPM | BODY MASS INDEX: 49.39 KG/M2 | WEIGHT: 245 LBS | HEIGHT: 59 IN | DIASTOLIC BLOOD PRESSURE: 80 MMHG

## 2018-01-23 NOTE — RESULT NOTES
Verified Results  (1) HEMOGLOBIN A1C [id number] 10:17AM Jennie Hoover Order Number: [id number]     Test Name Result Flag Reference   HEMOGLOBIN A1C 8 4 % H 4 2-6 3   EST  AVG   GLUCOSE 194 mg/dl

## 2018-01-23 NOTE — CONSULTS
Chief Complaint  pre op clearance: right eye cataract  Thursday 1/18/17  Dr Mitch Garcia        History of Present Illness  Pre-Op Visit (Brief): The patient is being seen for a preoperative visit and R cataract (Dr Mtich Garcia)  Surgical Risk Assessment:   Prior Anesthesia: She had prior anesthesia and no prior adverse reaction to general anesthesia  Pertinent Past Medical History: DMII, CAD, HTN, hyperlipidemia  Exercise Capacity: able to walk four blocks without symptoms and able to walk two flights of stairs without symptoms  Lifestyle Factors: denies alcohol use, denies tobacco use and denies illegal drug use  Symptoms: no easy bleeding, no easy bruising, no frequent nosebleeds, no chest pain, no cough, no dyspnea, no edema, no palpitations and no wheezing  HPI: as above  - pt states that she fell off of her diabetic diet over the holidays  A1C has increased - recently = 8 4    - pt denies CP, palp, lightheadedness ari ther CV symptoms  - no GI or  complaints  - still with chronic low back issues - possibly sl worse  No change in bowel or bladder  No leg weakness      Review of Systems    Constitutional: as noted in HPI  Eyes: as noted in HPI    ENT: no complaints of earache, no loss of hearing, no nose bleeds, no nasal discharge, no sore throat, no hoarseness  Cardiovascular: No complaints of slow heart rate, no fast heart rate, no chest pain, no palpitations, no leg claudication, no lower extremity edema  Respiratory: No complaints of shortness of breath, no wheezing, no cough, no SOB on exertion, no orthopnea, no PND  Gastrointestinal: No complaints of abdominal pain, no constipation, no nausea or vomiting, no diarrhea, no bloody stools  Genitourinary: No complaints of dysuria, no incontinence, no pelvic pain, no dysmenorrhea, no vaginal discharge or bleeding  Musculoskeletal: as noted in HPI  Integumentary: No complaints of skin rash or lesions, no itching, no skin wounds, no breast pain or lump  Neurological: as noted in HPI  Psychiatric: Not suicidal, no sleep disturbance, no anxiety or depression, no change in personality, no emotional problems  Endocrine: No complaints of proptosis, no hot flashes, no muscle weakness, no deepening of the voice, no feelings of weakness  Hematologic/Lymphatic: No complaints of swollen glands, no swollen glands in the neck, does not bleed easily, does not bruise easily  Preventive Quality 65 and Older: Falls Risk: The patient fell 0 times in the past 12 months  The patient is currently experiencing urinary symptoms  Urinary Incontinence Symptoms includes: urinary incontinence       Active Problems    1  Acute pharyngitis (462) (J02 9)   2  Allergic rhinitis (477 9) (J30 9)   3  Arteriosclerotic coronary artery disease (414 00) (I25 10)   4  Arthritis (716 90) (M19 90)   5  Bereavement, uncomplicated (E99 26) (Z15 0)   6  Blepharitis, unspecified laterality   7  Bone spur (726 91) (M77 9)   8  Cellulitis (682 9) (L03 90)   9  Chronic pain of right knee (669 01,755 33) (M25 561,G89 29)   10  Edema (782 3) (R60 9)   11  Encounter for screening colonoscopy (V76 51) (Z12 11)   12  Encounter for screening mammogram for breast cancer (V76 12) (Z12 31)   13  Esophageal reflux (530 81) (K21 9)   14  Heart disease (429 9) (I51 9)   15  Hyperglycemia (790 29) (R73 9)   16  Hyperlipidemia (272 4) (E78 5)   17  Hypertension (401 9) (I10)   18  Leg wound, right (891 0) (S81 801A)   19  Low back pain (724 2) (M54 5)   20  Lymphedema of both lower extremities (457 1) (I89 0)   21  Medicare annual wellness visit, initial (V70 0) (Z00 00)   22  Morbid obesity, unspecified obesity type (278 01) (E66 01)   23  Osteoarthritis, localized, knee (715 36) (M17 10)   24  Screening for genitourinary condition (V81 6) (Z13 89)   25  Screening for neurological condition (V80 09) (Z13 89)   26   Type 2 diabetes mellitus (250 00) (E11 9)    Past Medical History    · History of Acute Myocardial Infarction (V12 59)    The active problems and past medical history were reviewed and updated today  Surgical History    · History of Cath Stent 1 Type Drug-Eluting   · History of Hip Surgery    The surgical history was reviewed and updated today  Family History    · Family history of Coronary Artery Disease (V17 49)   · Family history of Diabetes Mellitus (V18 0)   · Family history of Heart Disease (V17 49)   · Family history of Hypertension (V17 49)   · Family history of Type 2 Diabetes Mellitus    · Family history of Coronary Artery Disease (V17 49)   · Family history of Diabetes Mellitus (V18 0)   · Family history of Diabetes Mellitus (V18 0)   · Family history of Type 2 Diabetes Mellitus   · Family history of Type 2 Diabetes Mellitus    · Family history of Diabetes Mellitus (V18 0)   · Family history of Type 2 Diabetes Mellitus    · Family history of Type 2 Diabetes Mellitus    Social History    · Denied: History of Alcohol Use (History)   · Bereavement, uncomplicated (E67 04) (U56 7)   · Daily Tea Consumption (___ Cups/Day)   · Marital History -  (V61 03)   · Never A Smoker   · Never Drank Alcohol   · Retired From Work  The social history was reviewed and updated today  Current Meds   1  AmLODIPine Besylate 5 MG Oral Tablet; TAKE 1 TABLET DAILY; Therapy: 62DTD5726 to (0472 94 41 68)  Requested for: 28Apr2017; Last   Rx:28Apr2017 Ordered   2  Aspirin 81 MG TABS; TAKE 1 TABLET DAILY; Therapy: 58KCJ1259 to (Evaluate:15Apr2017); Last Rx:16Mar2016 Ordered   3  HydroCHLOROthiazide 25 MG Oral Tablet; TAKE ONE (1) TABLET(S) DAILY  Requested   for: 40UBC9397; Last Rx:[id number] Ordered   4  Lisinopril 40 MG Oral Tablet; TAKE 1 TABLET DAILY; Therapy: 80LDY7579 to (0472 94 41 68)  Requested for: 28Apr2017; Last   Rx:28Apr2017 Ordered   5  Metoprolol Succinate  MG Oral Tablet Extended Release 24 Hour; TAKE 1 TABLET   ONCE DAILY;    Therapy: 13VCG7310 to (0472 94 41 68) Requested for: 28Apr2017; Last   Rx:28Apr2017 Ordered   6  Multivitamin/Fluoride 1 MG Oral Tablet Chewable; CHEW AND SWALLOW 1 TABLET   DAILY Recorded   7  Vitamin C 500 MG Oral Tablet; TAKE 1 TABLET DAILY  Requested for: 28Apr2017; Last   Rx:28Apr2017 Ordered    The medication list was reviewed and updated today  Allergies    1  Sulfa Drugs    Vitals  Signs    Temperature: 97 2 F  Heart Rate: 70  Systolic: 656  Diastolic: 80  Height: 4 ft 11 in  Weight: 245 lb   BMI Calculated: 49 48  BSA Calculated: 2 01    Physical Exam  Socks and shoes removed, Right Foot Findings: normal foot  The toes were normal and had full range of motion  Socks and Shoes removed, Left Foot Findings: normal foot  The toes were normal and had full range of motion  Monofilament Testing: normal tactile sensation with monofilament testing throughout both feet  Vascular: Capillary refills findings on the right were normal in the toes  Pulses: 1+ in the dorsalis pedis  Capillary refills findings on the left were normal in the toes  Pulses: 1+ in the dorsalis pedis  Assign Risk Category: 0: No loss of protective sensation, no deformity  No present risk  Constitutional   General appearance: No acute distress, well appearing and well nourished  Head and Face   Head and face: Normal     Eyes   Conjunctiva and lids: No swelling, erythema or discharge  Pupils and irises: Equal, round, reactive to light  Ophthalmoscopic examination: Abnormal   R cataract  Ears, Nose, Mouth, and Throat   External inspection of ears and nose: Normal     Otoscopic examination: Tympanic membranes translucent with normal light reflex  Canals patent without erythema  Nasal mucosa, septum, and turbinates: Normal without edema or erythema  Lips, teeth, and gums: Normal, good dentition  Oropharynx: Normal with no erythema, edema, exudate or lesions  Neck   Neck: Supple, symmetric, trachea midline, no masses      Pulmonary   Respiratory effort: No increased work of breathing or signs of respiratory distress  Auscultation of lungs: Clear to auscultation  Cardiovascular   Auscultation of heart: Normal rate and rhythm, normal S1 and S2, no murmurs  Carotid pulses: 2+ bilaterally  Abdominal aorta: Normal     Pedal pulses: Abnormal   decreased pedal pulses  Peripheral vascular exam: Normal     Examination of extremities for edema and/or varicosities: Abnormal   trace edema - leg wraps in place  Abdomen   Abdomen: Non-tender, no masses  Liver and spleen: No hepatomegaly or splenomegaly  Lymphatic   Palpation of lymph nodes in neck: No lymphadenopathy  Palpation of lymph nodes in other areas: No lymphadenopathy  Musculoskeletal   Gait and station: Abnormal   antalgic gait - ambulates with cane  Digits and nails: Normal without clubbing or cyanosis  Joints, bones, and muscles: Abnormal   Slight tenderness to palpation over the lower lumbar spine  No CVA tenderness  SLR causes sl increased low back pain  Muscle strength/tone: Normal     Skin   Skin and subcutaneous tissue: Normal without rashes or lesions  Neurologic   Cranial nerves: Cranial nerves II-XII intact  Cortical function: Normal mental status  Sensation: No sensory loss  Results/Data  PHQ-2 Adult Depression Screening [id number] 01:35PM User, sofatutor     Test Name Result Flag Reference   PHQ-2 Adult Depression Score 0     Over the last two weeks, how often have you been bothered by any of the following problems? Little interest or pleasure in doing things: Not at all - 0  Feeling down, depressed, or hopeless: Not at all - 0   PHQ-2 Adult Depression Screening Negative         Assessment    1  Preoperative clearance (V72 84) (Z01 818)   2  Hypertension (401 9) (I10)   3  Hyperlipidemia (272 4) (E78 5)   4  Type 2 diabetes mellitus (250 00) (E11 9)   5  Arteriosclerotic coronary artery disease (414 00) (I25 10)   6   Esophageal reflux (530 81) (K21 9)    Plan  Arteriosclerotic coronary artery disease, Esophageal reflux, Hyperlipidemia,  Hypertension, Preoperative clearance, Type 2 diabetes mellitus    · (1) CBC/PLT/DIFF; Status:Active; Requested for:05Apr2018;    · (1) COMPREHENSIVE METABOLIC PANEL; Status:Active; Requested for:05Apr2018;    · (1) HEMOGLOBIN A1C; Status:Active; Requested for:05Apr2018;    · (1) LIPID PANEL, FASTING; Status:Active; Requested for:05Apr2018;    · (1) MICROALBUMIN CREATININE RATIO, RANDOM URINE; Status:Active; Requested  for:05Apr2018;    · (1) TSH; Status:Active; Requested for:05Apr2018;   Type 2 diabetes mellitus    · MetFORMIN HCl - 500 MG Oral Tablet; Take 1 tablet twice a day   · *VB - Foot Exam; Status:Complete;   Done: 68LUN1415 12:00AM    Discussion/Summary  Surgical Clearance: She is at a LOW risk from a cardiovascular standpoint at this time without any additional cardiac testing  Reevaluation needed, if she should present with symptoms prior to surgery/procedure  Surgical clearance faxed to Dr Sima Edge   #1 Preop Clearance  #2 R cataract  #3 DMII  #4 HTN  #5 hyperlipidemia  #6 GERD  #7 CAD  - I reviewed with pt  PT to see Cardio later this week  I eric clear pt pending CArdio eval  BGs poorly controlled - add Metformin 500mg bid  Urged dietary compliance  Recheck labs in April and f/u 3m  Pt to call for problems or concerns in the interim  The patient was counseled regarding diagnostic results, instructions for management, risk factor reductions, prognosis, patient and family education, impressions, risks and benefits of treatment options, importance of compliance with treatment  Possible side effects of new medications were reviewed with the patient/guardian today  The treatment plan was reviewed with the patient/guardian  The patient/guardian understands and agrees with the treatment plan      End of Encounter Meds    1  Aspirin 81 MG TABS; TAKE 1 TABLET DAILY;    Therapy: 75PSM5511 to (Evaluate:15Apr2017); Last Rx:16Mar2016 Ordered   2  Metoprolol Succinate  MG Oral Tablet Extended Release 24 Hour; TAKE 1 TABLET   ONCE DAILY; Therapy: 92YVO7655 to ([id number])  Requested for: 28Apr2017; Last   Rx:28Apr2017 Ordered    3  AmLODIPine Besylate 5 MG Oral Tablet; TAKE 1 TABLET DAILY; Therapy: 81CDR1285 to (0389 [id number])  Requested for: 28Apr2017; Last   Rx:28Apr2017 Ordered   4  HydroCHLOROthiazide 25 MG Oral Tablet; TAKE ONE (1) TABLET(S) DAILY  Requested   for: 69MVG8481; Last Rx:80Ztn4810 Ordered   5  Lisinopril 40 MG Oral Tablet; TAKE 1 TABLET DAILY; Therapy: 65CBL5095 to (0389 [id number])  Requested for: 28Apr2017; Last   Rx:28Apr2017 Ordered    6  Vitamin C 500 MG Oral Tablet; TAKE 1 TABLET DAILY  Requested for: 00XGA5366; Last   Rx:28Apr2017 Ordered    7  MetFORMIN HCl - 500 MG Oral Tablet; Take 1 tablet twice a day; Therapy: 62CXC2442 to (Kala Ly)  Requested for: 10YER8106; Last   Rx:09Jan2018 Ordered    8   Multivitamin/Fluoride 1 MG Oral Tablet Chewable; CHEW AND SWALLOW 1 TABLET   DAILY Recorded    Signatures   Electronically signed by : RICHARD Crane ; Jan 9 2018  8:42PM EST                       (Author)

## 2018-01-24 VITALS
WEIGHT: 243 LBS | SYSTOLIC BLOOD PRESSURE: 134 MMHG | HEIGHT: 59 IN | DIASTOLIC BLOOD PRESSURE: 60 MMHG | BODY MASS INDEX: 48.99 KG/M2 | HEART RATE: 97 BPM

## 2018-03-14 RX ORDER — METOPROLOL SUCCINATE 100 MG/1
1 TABLET, EXTENDED RELEASE ORAL DAILY
COMMUNITY
Start: 2011-10-13 | End: 2018-05-14 | Stop reason: SDUPTHER

## 2018-03-14 RX ORDER — LISINOPRIL 40 MG/1
1 TABLET ORAL DAILY
COMMUNITY
Start: 2011-10-13 | End: 2018-04-23 | Stop reason: SDUPTHER

## 2018-03-14 RX ORDER — AMLODIPINE BESYLATE 5 MG/1
1 TABLET ORAL DAILY
COMMUNITY
Start: 2011-10-13 | End: 2018-04-23 | Stop reason: SDUPTHER

## 2018-03-14 RX ORDER — MULTIVIT-MIN/IRON/FOLIC ACID/K 18-600-40
1 CAPSULE ORAL DAILY
COMMUNITY

## 2018-03-14 RX ORDER — HYDROCHLOROTHIAZIDE 25 MG/1
1 TABLET ORAL DAILY
COMMUNITY
End: 2018-09-17 | Stop reason: SDUPTHER

## 2018-03-21 ENCOUNTER — OFFICE VISIT (OUTPATIENT)
Dept: CARDIOLOGY CLINIC | Facility: CLINIC | Age: 78
End: 2018-03-21
Payer: COMMERCIAL

## 2018-03-21 VITALS
SYSTOLIC BLOOD PRESSURE: 148 MMHG | HEIGHT: 59 IN | WEIGHT: 245 LBS | DIASTOLIC BLOOD PRESSURE: 60 MMHG | BODY MASS INDEX: 49.39 KG/M2 | HEART RATE: 72 BPM

## 2018-03-21 DIAGNOSIS — I25.10 CAD IN NATIVE ARTERY: Primary | ICD-10-CM

## 2018-03-21 DIAGNOSIS — I10 ESSENTIAL HYPERTENSION: ICD-10-CM

## 2018-03-21 DIAGNOSIS — E78.00 HYPERCHOLESTEROLEMIA: ICD-10-CM

## 2018-03-21 PROCEDURE — 99213 OFFICE O/P EST LOW 20 MIN: CPT | Performed by: INTERNAL MEDICINE

## 2018-03-21 RX ORDER — NITROGLYCERIN 0.4 MG/1
0.4 TABLET SUBLINGUAL
Qty: 25 TABLET | Refills: 3 | Status: SHIPPED | OUTPATIENT
Start: 2018-03-21 | End: 2019-07-16

## 2018-03-21 RX ORDER — DIPHENOXYLATE HYDROCHLORIDE AND ATROPINE SULFATE 2.5; .025 MG/1; MG/1
1 TABLET ORAL DAILY
COMMUNITY
End: 2020-03-18

## 2018-03-21 NOTE — PROGRESS NOTES
Cardiology Follow Up    Izaiah Abraham [facility]  1940  [id number]  [address] [facility]  [address] [place] [address] [address]    1  CAD in native artery  nitroglycerin (NITROSTAT) 0 4 mg SL tablet   2  Essential hypertension     3  Hypercholesterolemia           Discussion/Summary: All of her assessed cardiac problems are stable  I have reviewed her medications and made no changes  No cardiac testing is ordered  RTO 1 year  Interval History: She has not had any cardiac problems since her last OV  She denies any CP, significant SOB, palpitations  Her BP is elevated  She has CAD with L Cx stenting in 2010  Patient Active Problem List   Diagnosis    CAD in native artery    Essential hypertension    Hypercholesterolemia     Past Medical History:   Diagnosis Date    Coronary artery disease     Diabetes mellitus ([facility] )     Hyperlipidemia     Hypertension     Lymphedema     Morbid obesity ([facility] )     Myocardial infarction      Social History     Social History    Marital status:      Spouse name: N/A    Number of children: N/A    Years of education: N/A     Occupational History    Not on file       Social History Main Topics    Smoking status: Never Smoker    Smokeless tobacco: Never Used    Alcohol use Not on file    Drug use: Unknown    Sexual activity: Not on file     Other Topics Concern    Not on file     Social History Narrative    No narrative on file      Family History   Problem Relation Age of Onset    Heart disease Mother     Diabetes type II Mother     Hypertension Mother     Heart disease Sister     Diabetes type II Sister     Diabetes type II Brother      Past Surgical History:   Procedure Laterality Date    CARDIAC CATHETERIZATION      HIP SURGERY         Current Outpatient Prescriptions:     amLODIPine (NORVASC) 5 mg tablet, Take 1 tablet by mouth daily, Disp: , Rfl:     Ascorbic Acid (VITAMIN C) 500 MG CAPS, Take 1 tablet by mouth daily, Disp: , Rfl:     aspirin 81 MG tablet, Take 1 tablet by mouth as needed Take 1 tablet weekly , Disp: , Rfl:     hydrochlorothiazide (HYDRODIURIL) 25 mg tablet, Take 1 tablet by mouth daily, Disp: , Rfl:     lisinopril (ZESTRIL) 40 mg tablet, Take 1 tablet by mouth daily, Disp: , Rfl:     metFORMIN (GLUCOPHAGE) 500 mg tablet, Take 1 tablet by mouth 2 (two) times a day, Disp: , Rfl:     metoprolol succinate (TOPROL-XL) 100 mg 24 hr tablet, Take 1 tablet by mouth daily, Disp: , Rfl:     multivitamin (THERAGRAN) TABS, Take 1 tablet by mouth daily, Disp: , Rfl:     nitroglycerin (NITROSTAT) 0 4 mg SL tablet, Place 1 tablet (0 4 mg total) under the tongue every 5 (five) minutes as needed for chest pain, Disp: 25 tablet, Rfl: 3  Allergies   Allergen Reactions    Sulfa Antibiotics        Labs:  No visits with results within 2 Month(s) from this visit  Latest known visit with results is:   Appointment on 01/05/2018   Component Date Value    Sodium 01/05/2018 135*    Potassium 01/05/2018 4 0     Chloride 01/05/2018 97*    CO2 01/05/2018 28     Anion Gap 01/05/2018 10     BUN 01/05/2018 14     Creatinine 01/05/2018 0 87     Glucose, Fasting 01/05/2018 140*    Calcium 01/05/2018 9 5     eGFR 01/05/2018 65     Hemoglobin A1C 01/05/2018 8 4*    EAG 01/05/2018 194      Imaging: No results found  Review of Systems:  Review of Systems   Constitutional: Negative for diaphoresis, fatigue, fever and unexpected weight change  HENT: Negative  Respiratory: Negative for cough, shortness of breath and wheezing  Cardiovascular: Negative for chest pain, palpitations and leg swelling  Gastrointestinal: Negative for abdominal pain, diarrhea and nausea  Musculoskeletal: Negative for gait problem and myalgias  Skin: Negative for rash  Neurological: Negative for dizziness and numbness  Psychiatric/Behavioral: Negative          Physical Exam:  Physical Exam   Constitutional: She is oriented to person, place, and time  She appears well-developed and well-nourished  HENT:   Head: Normocephalic and atraumatic  Eyes: Pupils are equal, round, and reactive to light  Neck: Normal range of motion  Neck supple  No JVD present  Cardiovascular: Regular rhythm, S1 normal, S2 normal and normal pulses  Pulses:       Carotid pulses are 2+ on the right side, and 2+ on the left side  Pulmonary/Chest: Effort normal and breath sounds normal  She has no wheezes  She has no rales  Abdominal: Soft  Bowel sounds are normal  There is no tenderness  Musculoskeletal: Normal range of motion  She exhibits edema  She exhibits no tenderness  Legs wrapped   Neurological: She is alert and oriented to person, place, and time  She has normal reflexes  No cranial nerve deficit  Skin: Skin is warm  Psychiatric: She has a normal mood and affect

## 2018-04-05 DIAGNOSIS — K21.9 GASTRO-ESOPHAGEAL REFLUX DISEASE WITHOUT ESOPHAGITIS: ICD-10-CM

## 2018-04-05 DIAGNOSIS — I10 ESSENTIAL (PRIMARY) HYPERTENSION: ICD-10-CM

## 2018-04-05 DIAGNOSIS — E78.5 HYPERLIPIDEMIA: ICD-10-CM

## 2018-04-05 DIAGNOSIS — I25.10 ATHEROSCLEROTIC HEART DISEASE OF NATIVE CORONARY ARTERY WITHOUT ANGINA PECTORIS: ICD-10-CM

## 2018-04-05 DIAGNOSIS — Z01.818 ENCOUNTER FOR OTHER PREPROCEDURAL EXAMINATION: ICD-10-CM

## 2018-04-05 DIAGNOSIS — E11.9 TYPE 2 DIABETES MELLITUS WITHOUT COMPLICATIONS (HCC): ICD-10-CM

## 2018-04-06 ENCOUNTER — APPOINTMENT (OUTPATIENT)
Dept: LAB | Facility: CLINIC | Age: 78
End: 2018-04-06
Payer: COMMERCIAL

## 2018-04-06 ENCOUNTER — TRANSCRIBE ORDERS (OUTPATIENT)
Dept: LAB | Facility: CLINIC | Age: 78
End: 2018-04-06

## 2018-04-06 DIAGNOSIS — I25.10 ATHEROSCLEROTIC HEART DISEASE OF NATIVE CORONARY ARTERY WITHOUT ANGINA PECTORIS: ICD-10-CM

## 2018-04-06 DIAGNOSIS — I10 ESSENTIAL (PRIMARY) HYPERTENSION: ICD-10-CM

## 2018-04-06 DIAGNOSIS — Z01.818 ENCOUNTER FOR OTHER PREPROCEDURAL EXAMINATION: ICD-10-CM

## 2018-04-06 DIAGNOSIS — K21.9 GASTRO-ESOPHAGEAL REFLUX DISEASE WITHOUT ESOPHAGITIS: ICD-10-CM

## 2018-04-06 DIAGNOSIS — E78.5 HYPERLIPIDEMIA: ICD-10-CM

## 2018-04-06 DIAGNOSIS — E11.9 TYPE 2 DIABETES MELLITUS WITHOUT COMPLICATIONS (HCC): ICD-10-CM

## 2018-04-06 LAB
ALBUMIN SERPL BCP-MCNC: 3.2 G/DL (ref 3.5–5)
ALP SERPL-CCNC: 60 U/L (ref 46–116)
ALT SERPL W P-5'-P-CCNC: 35 U/L (ref 12–78)
ANION GAP SERPL CALCULATED.3IONS-SCNC: 8 MMOL/L (ref 4–13)
AST SERPL W P-5'-P-CCNC: 40 U/L (ref 5–45)
BASOPHILS # BLD AUTO: 0.04 THOUSANDS/ΜL (ref 0–0.1)
BASOPHILS NFR BLD AUTO: 1 % (ref 0–1)
BILIRUB SERPL-MCNC: 0.4 MG/DL (ref 0.2–1)
BUN SERPL-MCNC: 14 MG/DL (ref 5–25)
CALCIUM SERPL-MCNC: 8.8 MG/DL (ref 8.3–10.1)
CHLORIDE SERPL-SCNC: 103 MMOL/L (ref 100–108)
CHOLEST SERPL-MCNC: 198 MG/DL (ref 50–200)
CO2 SERPL-SCNC: 28 MMOL/L (ref 21–32)
CREAT SERPL-MCNC: 0.83 MG/DL (ref 0.6–1.3)
CREAT UR-MCNC: 198 MG/DL
EOSINOPHIL # BLD AUTO: 0.3 THOUSAND/ΜL (ref 0–0.61)
EOSINOPHIL NFR BLD AUTO: 4 % (ref 0–6)
ERYTHROCYTE [DISTWIDTH] IN BLOOD BY AUTOMATED COUNT: 13.3 % (ref 11.6–15.1)
EST. AVERAGE GLUCOSE BLD GHB EST-MCNC: 192 MG/DL
GFR SERPL CREATININE-BSD FRML MDRD: 69 ML/MIN/1.73SQ M
GLUCOSE P FAST SERPL-MCNC: 146 MG/DL (ref 65–99)
HBA1C MFR BLD: 8.3 % (ref 4.2–6.3)
HCT VFR BLD AUTO: 40.1 % (ref 34.8–46.1)
HDLC SERPL-MCNC: 41 MG/DL (ref 40–60)
HGB BLD-MCNC: 12.4 G/DL (ref 11.5–15.4)
LDLC SERPL CALC-MCNC: 108 MG/DL (ref 0–100)
LYMPHOCYTES # BLD AUTO: 2.57 THOUSANDS/ΜL (ref 0.6–4.47)
LYMPHOCYTES NFR BLD AUTO: 31 % (ref 14–44)
MCH RBC QN AUTO: 29.9 PG (ref 26.8–34.3)
MCHC RBC AUTO-ENTMCNC: 30.9 G/DL (ref 31.4–37.4)
MCV RBC AUTO: 97 FL (ref 82–98)
MICROALBUMIN UR-MCNC: 23.8 MG/L (ref 0–20)
MICROALBUMIN/CREAT 24H UR: 12 MG/G CREATININE (ref 0–30)
MONOCYTES # BLD AUTO: 0.89 THOUSAND/ΜL (ref 0.17–1.22)
MONOCYTES NFR BLD AUTO: 11 % (ref 4–12)
NEUTROPHILS # BLD AUTO: 4.58 THOUSANDS/ΜL (ref 1.85–7.62)
NEUTS SEG NFR BLD AUTO: 53 % (ref 43–75)
NONHDLC SERPL-MCNC: 157 MG/DL
NRBC BLD AUTO-RTO: 0 /100 WBCS
PLATELET # BLD AUTO: 230 THOUSANDS/UL (ref 149–390)
PMV BLD AUTO: 11.3 FL (ref 8.9–12.7)
POTASSIUM SERPL-SCNC: 4.2 MMOL/L (ref 3.5–5.3)
PROT SERPL-MCNC: 7 G/DL (ref 6.4–8.2)
RBC # BLD AUTO: 4.15 MILLION/UL (ref 3.81–5.12)
SODIUM SERPL-SCNC: 139 MMOL/L (ref 136–145)
TRIGL SERPL-MCNC: 243 MG/DL
TSH SERPL DL<=0.05 MIU/L-ACNC: 3.11 UIU/ML (ref 0.36–3.74)
WBC # BLD AUTO: 8.41 THOUSAND/UL (ref 4.31–10.16)

## 2018-04-06 PROCEDURE — 85025 COMPLETE CBC W/AUTO DIFF WBC: CPT

## 2018-04-06 PROCEDURE — 80053 COMPREHEN METABOLIC PANEL: CPT

## 2018-04-06 PROCEDURE — 36415 COLL VENOUS BLD VENIPUNCTURE: CPT

## 2018-04-06 PROCEDURE — 80061 LIPID PANEL: CPT

## 2018-04-06 PROCEDURE — 84443 ASSAY THYROID STIM HORMONE: CPT

## 2018-04-06 PROCEDURE — 82043 UR ALBUMIN QUANTITATIVE: CPT

## 2018-04-06 PROCEDURE — 82570 ASSAY OF URINE CREATININE: CPT

## 2018-04-06 PROCEDURE — 83036 HEMOGLOBIN GLYCOSYLATED A1C: CPT

## 2018-04-23 DIAGNOSIS — I10 HYPERTENSION, UNSPECIFIED TYPE: Primary | ICD-10-CM

## 2018-04-23 RX ORDER — LISINOPRIL 40 MG/1
40 TABLET ORAL DAILY
Qty: 30 TABLET | Refills: 5 | Status: SHIPPED | OUTPATIENT
Start: 2018-04-23 | End: 2018-09-25 | Stop reason: SDUPTHER

## 2018-04-23 RX ORDER — AMLODIPINE BESYLATE 5 MG/1
5 TABLET ORAL DAILY
Qty: 30 TABLET | Refills: 5 | Status: SHIPPED | OUTPATIENT
Start: 2018-04-23 | End: 2018-09-25 | Stop reason: SDUPTHER

## 2018-05-14 DIAGNOSIS — I48.91 ATRIAL FIBRILLATION, UNSPECIFIED TYPE (HCC): Primary | ICD-10-CM

## 2018-05-15 RX ORDER — METOPROLOL SUCCINATE 100 MG/1
100 TABLET, EXTENDED RELEASE ORAL DAILY
Qty: 90 TABLET | Refills: 3 | Status: SHIPPED | OUTPATIENT
Start: 2018-05-15 | End: 2019-06-11 | Stop reason: SDUPTHER

## 2018-09-17 DIAGNOSIS — I10 ESSENTIAL HYPERTENSION: Primary | ICD-10-CM

## 2018-09-17 RX ORDER — HYDROCHLOROTHIAZIDE 25 MG/1
25 TABLET ORAL DAILY
Qty: 90 TABLET | Refills: 3 | Status: SHIPPED | OUTPATIENT
Start: 2018-09-17 | End: 2018-10-25 | Stop reason: SDUPTHER

## 2018-09-25 DIAGNOSIS — I10 HYPERTENSION, UNSPECIFIED TYPE: ICD-10-CM

## 2018-09-25 RX ORDER — LISINOPRIL 40 MG/1
40 TABLET ORAL DAILY
Qty: 90 TABLET | Refills: 3 | Status: SHIPPED | OUTPATIENT
Start: 2018-09-25 | End: 2018-10-25 | Stop reason: SDUPTHER

## 2018-09-25 RX ORDER — AMLODIPINE BESYLATE 5 MG/1
5 TABLET ORAL DAILY
Qty: 90 TABLET | Refills: 3 | Status: SHIPPED | OUTPATIENT
Start: 2018-09-25 | End: 2018-10-25 | Stop reason: SDUPTHER

## 2018-10-25 DIAGNOSIS — I10 ESSENTIAL HYPERTENSION: ICD-10-CM

## 2018-10-25 DIAGNOSIS — I10 HYPERTENSION, UNSPECIFIED TYPE: ICD-10-CM

## 2018-10-26 RX ORDER — AMLODIPINE BESYLATE 5 MG/1
5 TABLET ORAL DAILY
Qty: 90 TABLET | Refills: 2 | Status: SHIPPED | OUTPATIENT
Start: 2018-10-26 | End: 2019-08-01 | Stop reason: SDUPTHER

## 2018-10-26 RX ORDER — HYDROCHLOROTHIAZIDE 25 MG/1
25 TABLET ORAL DAILY
Qty: 90 TABLET | Refills: 2 | Status: SHIPPED | OUTPATIENT
Start: 2018-10-26 | End: 2019-09-20 | Stop reason: SDUPTHER

## 2018-10-26 RX ORDER — LISINOPRIL 40 MG/1
40 TABLET ORAL DAILY
Qty: 90 TABLET | Refills: 2 | Status: SHIPPED | OUTPATIENT
Start: 2018-10-26 | End: 2019-08-01 | Stop reason: SDUPTHER

## 2019-02-11 ENCOUNTER — TELEPHONE (OUTPATIENT)
Dept: FAMILY MEDICINE CLINIC | Facility: CLINIC | Age: 79
End: 2019-02-11

## 2019-02-11 DIAGNOSIS — A08.4 VIRAL GASTROENTERITIS: Primary | ICD-10-CM

## 2019-02-11 RX ORDER — ONDANSETRON HYDROCHLORIDE 8 MG/1
8 TABLET, FILM COATED ORAL EVERY 8 HOURS PRN
Qty: 30 TABLET | Refills: 0 | Status: SHIPPED | OUTPATIENT
Start: 2019-02-11 | End: 2019-07-16

## 2019-02-11 NOTE — TELEPHONE ENCOUNTER
She had vomiting, still has nausea, loose stools x 2 days  Can you call something in to [facility]  Allergy:  Sulfa    Will ck pharmacy later

## 2019-02-11 NOTE — TELEPHONE ENCOUNTER
WE don't give antidiarrheals because it will retain virus in colon longer  I will call in an antinausea med; clear liquids x 24 hrs and advance slowly as tolerated

## 2019-03-06 ENCOUNTER — OFFICE VISIT (OUTPATIENT)
Dept: CARDIOLOGY CLINIC | Facility: CLINIC | Age: 79
End: 2019-03-06
Payer: COMMERCIAL

## 2019-03-06 VITALS
SYSTOLIC BLOOD PRESSURE: 130 MMHG | HEIGHT: 59 IN | DIASTOLIC BLOOD PRESSURE: 60 MMHG | WEIGHT: 226 LBS | HEART RATE: 91 BPM | BODY MASS INDEX: 45.56 KG/M2

## 2019-03-06 DIAGNOSIS — E78.00 HYPERCHOLESTEROLEMIA: ICD-10-CM

## 2019-03-06 DIAGNOSIS — I25.10 CAD IN NATIVE ARTERY: Primary | ICD-10-CM

## 2019-03-06 DIAGNOSIS — I10 ESSENTIAL HYPERTENSION: ICD-10-CM

## 2019-03-06 PROCEDURE — 99213 OFFICE O/P EST LOW 20 MIN: CPT | Performed by: INTERNAL MEDICINE

## 2019-03-06 PROCEDURE — 93000 ELECTROCARDIOGRAM COMPLETE: CPT | Performed by: INTERNAL MEDICINE

## 2019-03-06 NOTE — PROGRESS NOTES
Cardiology Follow Up    Rimma Barbosa [facility]  1940  [id number]  [address] [facility]  [address] [place] [address] [address] [facility] [address] [place] [address] Road    1  CAD in native artery  POCT ECG   2  Essential hypertension  POCT ECG   3  Hypercholesterolemia           Discussion/Summary: All of her assessed cardiac problems are stable  I have reviewed her medications and made no changes  No cardiac testing is ordered  RTO 1 year  Interval History: She has not had any cardiac problems since her last OV  She denies CP, SOB, palpitations  She has CAD with L Cx stenting in 2010  She refuses to take statins  She did lose 20 LBS from a recent bout of N/V/D  She may had had the flu  ECG today - NSR, PRWP      Patient Active Problem List   Diagnosis    CAD in native artery    Essential hypertension    Hypercholesterolemia     Past Medical History:   Diagnosis Date    Bereavement, uncomplicated     [id number]  RESOLVED    Coronary artery disease     Diabetes mellitus ([facility]     Hyperlipidemia     Hypertension     Lymphedema     Morbid obesity ([facility] )     Myocardial infarction ([facility] )      Social History     Socioeconomic History    Marital status:      Spouse name: Not on file    Number of children: Not on file    Years of education: Not on file    Highest education level: Not on file   Occupational History    Occupation: RETIRED   Social Needs    Financial resource strain: Not on file    Food insecurity:     Worry: Not on file     Inability: Not on file    Transportation needs:     Medical: Not on file     Non-medical: Not on file   Tobacco Use    Smoking status: Never Smoker    Smokeless tobacco: Never Used   Substance and Sexual Activity    Alcohol use: No    Drug use: Not on file    Sexual activity: Not on file   Lifestyle    Physical activity:     Days per week: Not on file     Minutes per session: Not on file    Stress: Not on file   Relationships    Social connections:     Talks on phone: Not on file     Gets together: Not on file     Attends Oriental orthodox service: Not on file     Active member of club or organization: Not on file     Attends meetings of clubs or organizations: Not on file     Relationship status: Not on file    Intimate partner violence:     Fear of current or ex partner: Not on file     Emotionally abused: Not on file     Physically abused: Not on file     Forced sexual activity: Not on file   Other Topics Concern    Not on file   Social History Narrative    [employer]ILY [employer]      Family History   Problem Relation Age of Onset    Heart disease Mother     Diabetes type II Mother     Hypertension Mother     Heart disease Sister     Diabetes type II Sister     Diabetes type II Brother     Diabetes type II Family      Past Surgical History:   Procedure Laterality Date    CARDIAC CATHETERIZATION      HIP SURGERY         Current Outpatient Medications:     amLODIPine (NORVASC) 5 mg tablet, Take 1 tablet (5 mg total) by mouth daily, Disp: 90 tablet, Rfl: 2    Ascorbic Acid (VITAMIN C) 500 MG CAPS, Take 1 tablet by mouth daily, Disp: , Rfl:     aspirin 81 MG tablet, Take 1 tablet by mouth as needed Take 1 tablet weekly , Disp: , Rfl:     hydrochlorothiazide (HYDRODIURIL) 25 mg tablet, Take 1 tablet (25 mg total) by mouth daily, Disp: 90 tablet, Rfl: 2    lisinopril (ZESTRIL) 40 mg tablet, Take 1 tablet (40 mg total) by mouth daily, Disp: 90 tablet, Rfl: 2    metFORMIN (GLUCOPHAGE) 500 mg tablet, Take 1 tablet by mouth 2 (two) times a day, Disp: , Rfl:     metoprolol succinate (TOPROL-XL) 100 mg 24 hr tablet, Take 1 tablet (100 mg total) by mouth daily, Disp: 90 tablet, Rfl: 3    multivitamin (THERAGRAN) TABS, Take 1 tablet by mouth daily, Disp: , Rfl:     nitroglycerin (NITROSTAT) 0 4 mg SL tablet, Place 1 tablet (0 4 mg total) under the tongue every 5 (five) minutes as needed for chest pain (Patient not taking: Reported on 3/6/2019), Disp: 25 tablet, Rfl: 3    ondansetron (ZOFRAN) 8 mg tablet, Take 1 tablet (8 mg total) by mouth every 8 (eight) hours as needed for nausea or vomiting (Patient not taking: Reported on 3/6/2019), Disp: 30 tablet, Rfl: 0  Allergies   Allergen Reactions    Sulfa Antibiotics Other (See Comments)     Vitals:    03/06/19 0910   BP: 130/60   BP Location: Left arm   Patient Position: Sitting   Cuff Size: Large   Pulse: 91   Weight: 103 kg (226 lb)   Height: 4' 11" (1 499 m)     Weight (last 2 days)     Date/Time   Weight    03/06/19 0910   103 (226)             Blood pressure 130/60, pulse 91, height 4' 11" (1 499 m), weight 103 kg (226 lb)  , Body mass index is 45 65 kg/m²  Labs:  No visits with results within 6 Month(s) from this visit     Latest known visit with results is:   Appointment on 04/06/2018   Component Date Value    WBC 04/06/2018 8 41     RBC 04/06/2018 4 15     Hemoglobin 04/06/2018 12 4     Hematocrit 04/06/2018 40 1     MCV 04/06/2018 97     MCH 04/06/2018 29 9     MCHC 04/06/2018 30 9*    RDW 04/06/2018 13 3     MPV 04/06/2018 11 3     Platelets 97/95/0751 230     nRBC 04/06/2018 0     Neutrophils Relative 04/06/2018 53     Lymphocytes Relative 04/06/2018 31     Monocytes Relative 04/06/2018 11     Eosinophils Relative 04/06/2018 4     Basophils Relative 04/06/2018 1     Neutrophils Absolute 04/06/2018 4 58     Lymphocytes Absolute 04/06/2018 2 57     Monocytes Absolute 04/06/2018 0 89     Eosinophils Absolute 04/06/2018 0 30     Basophils Absolute 04/06/2018 0 04     Sodium 04/06/2018 139     Potassium 04/06/2018 4 2     Chloride 04/06/2018 103     CO2 04/06/2018 28     ANION GAP 04/06/2018 8     BUN 04/06/2018 14     Creatinine 04/06/2018 0 83     Glucose, Fasting 04/06/2018 146*    Calcium 04/06/2018 8 8     AST 04/06/2018 40     ALT 04/06/2018 35     Alkaline Phosphatase 04/06/2018 60     Total Protein 04/06/2018 7 0  Albumin 04/06/2018 3 2*    Total Bilirubin 04/06/2018 0 40     eGFR 04/06/2018 69     Cholesterol 04/06/2018 198     Triglycerides 04/06/2018 243*    HDL, Direct 04/06/2018 41     LDL Calculated 04/06/2018 108*    Non-HDL-Chol (CHOL-HDL) 04/06/2018 157     Hemoglobin A1C 04/06/2018 8 3*    EAG 04/06/2018 192     TSH 3RD GENERATON 04/06/2018 3 110     Creatinine, Ur 04/06/2018 198 0     Microalbum  ,U,Random 04/06/2018 23 8*    Microalb Creat Ratio 04/06/2018 12      Imaging: No results found  Review of Systems:  Review of Systems   Constitutional: Negative for diaphoresis, fatigue, fever and unexpected weight change  HENT: Negative  Respiratory: Negative for cough, shortness of breath and wheezing  Cardiovascular: Negative for chest pain, palpitations and leg swelling  Gastrointestinal: Negative for abdominal pain, diarrhea and nausea  Musculoskeletal: Negative for gait problem and myalgias  Skin: Negative for rash  Neurological: Negative for dizziness and numbness  Psychiatric/Behavioral: Negative  Physical Exam:  Physical Exam   Constitutional: She is oriented to person, place, and time  She appears well-developed and well-nourished  HENT:   Head: Normocephalic and atraumatic  Eyes: Pupils are equal, round, and reactive to light  Neck: Normal range of motion  Neck supple  No JVD present  Cardiovascular: Regular rhythm, S1 normal, S2 normal and normal pulses  Pulses:       Carotid pulses are 2+ on the right side, and 2+ on the left side  Pulmonary/Chest: Effort normal and breath sounds normal  She has no wheezes  She has no rales  Abdominal: Soft  Bowel sounds are normal  There is no tenderness  Musculoskeletal: Normal range of motion  She exhibits no edema or tenderness  Neurological: She is alert and oriented to person, place, and time  She has normal reflexes  No cranial nerve deficit  Skin: Skin is warm     Psychiatric: She has a normal mood and affect

## 2019-03-11 DIAGNOSIS — E11.9 TYPE 2 DIABETES MELLITUS WITHOUT COMPLICATION, WITHOUT LONG-TERM CURRENT USE OF INSULIN (HCC): Primary | ICD-10-CM

## 2019-04-29 PROBLEM — E66.01 MORBID OBESITY (HCC): Status: ACTIVE | Noted: 2019-04-29

## 2019-05-29 ENCOUNTER — TELEPHONE (OUTPATIENT)
Dept: FAMILY MEDICINE CLINIC | Facility: CLINIC | Age: 79
End: 2019-05-29

## 2019-05-29 ENCOUNTER — OFFICE VISIT (OUTPATIENT)
Dept: FAMILY MEDICINE CLINIC | Facility: CLINIC | Age: 79
End: 2019-05-29
Payer: COMMERCIAL

## 2019-05-29 VITALS
HEART RATE: 84 BPM | TEMPERATURE: 99 F | HEIGHT: 59 IN | SYSTOLIC BLOOD PRESSURE: 138 MMHG | BODY MASS INDEX: 44.72 KG/M2 | DIASTOLIC BLOOD PRESSURE: 64 MMHG | WEIGHT: 221.8 LBS

## 2019-05-29 DIAGNOSIS — L25.3 CONTACT DERMATITIS DUE TO OTHER CHEMICAL PRODUCT, UNSPECIFIED CONTACT DERMATITIS TYPE: Primary | ICD-10-CM

## 2019-05-29 PROCEDURE — 99213 OFFICE O/P EST LOW 20 MIN: CPT | Performed by: FAMILY MEDICINE

## 2019-05-29 PROCEDURE — 1101F PT FALLS ASSESS-DOCD LE1/YR: CPT | Performed by: FAMILY MEDICINE

## 2019-05-29 PROCEDURE — 3725F SCREEN DEPRESSION PERFORMED: CPT | Performed by: FAMILY MEDICINE

## 2019-05-29 RX ORDER — PREDNISONE 20 MG/1
20 TABLET ORAL 2 TIMES DAILY WITH MEALS
Qty: 10 TABLET | Refills: 0 | Status: SHIPPED | OUTPATIENT
Start: 2019-05-29 | End: 2019-07-16

## 2019-06-11 DIAGNOSIS — I48.91 ATRIAL FIBRILLATION, UNSPECIFIED TYPE (HCC): ICD-10-CM

## 2019-06-11 RX ORDER — METOPROLOL SUCCINATE 100 MG/1
100 TABLET, EXTENDED RELEASE ORAL DAILY
Qty: 90 TABLET | Refills: 3 | Status: SHIPPED | OUTPATIENT
Start: 2019-06-11 | End: 2020-06-04 | Stop reason: SDUPTHER

## 2019-06-19 ENCOUNTER — OFFICE VISIT (OUTPATIENT)
Dept: OBGYN CLINIC | Facility: CLINIC | Age: 79
End: 2019-06-19
Payer: COMMERCIAL

## 2019-06-19 VITALS
SYSTOLIC BLOOD PRESSURE: 132 MMHG | BODY MASS INDEX: 42.13 KG/M2 | HEIGHT: 59 IN | DIASTOLIC BLOOD PRESSURE: 80 MMHG | WEIGHT: 209 LBS

## 2019-06-19 DIAGNOSIS — B37.3 YEAST VAGINITIS: Primary | ICD-10-CM

## 2019-06-19 DIAGNOSIS — N89.8 VAGINAL ITCHING: ICD-10-CM

## 2019-06-19 PROCEDURE — 99213 OFFICE O/P EST LOW 20 MIN: CPT | Performed by: PHYSICIAN ASSISTANT

## 2019-06-19 RX ORDER — FLUCONAZOLE 150 MG/1
150 TABLET ORAL ONCE
Qty: 1 TABLET | Refills: 1 | Status: SHIPPED | OUTPATIENT
Start: 2019-06-19 | End: 2019-06-19

## 2019-06-22 LAB
A VAGINAE DNA VAG NAA+PROBE-LOG#: <3.25
A VAGINAE DNA VAG QL NAA+PROBE: NOT DETECTED
BVAB2 DNA VAG QL NAA+PROBE: NOT DETECTED
G VAGINALIS DNA SPEC QL NAA+PROBE: NOT DETECTED
G VAGINALIS DNA VAG NAA+PROBE-LOG#: <3.25
LACTOBACILLUS DNA VAG NAA+PROBE-LOG#: ABNORMAL
LACTOBACILLUS DNA VAG NAA+PROBE-LOG#: DETECTED
MEGA1 DNA VAG QL NAA+PROBE: NOT DETECTED
SL AMB C.ALBICANS BY PCR: DETECTED
SL AMB CANDIDA GENUS: DETECTED
T VAGINALIS RRNA SPEC QL NAA+PROBE: NOT DETECTED

## 2019-07-16 ENCOUNTER — OFFICE VISIT (OUTPATIENT)
Dept: FAMILY MEDICINE CLINIC | Facility: CLINIC | Age: 79
End: 2019-07-16
Payer: COMMERCIAL

## 2019-07-16 VITALS
TEMPERATURE: 97.8 F | DIASTOLIC BLOOD PRESSURE: 74 MMHG | SYSTOLIC BLOOD PRESSURE: 128 MMHG | HEART RATE: 80 BPM | WEIGHT: 216 LBS | HEIGHT: 59 IN | BODY MASS INDEX: 43.55 KG/M2

## 2019-07-16 DIAGNOSIS — Z12.12 SCREENING FOR COLORECTAL CANCER: ICD-10-CM

## 2019-07-16 DIAGNOSIS — Z00.00 MEDICARE ANNUAL WELLNESS VISIT, SUBSEQUENT: Primary | ICD-10-CM

## 2019-07-16 DIAGNOSIS — E78.00 PURE HYPERCHOLESTEROLEMIA: ICD-10-CM

## 2019-07-16 DIAGNOSIS — I25.10 ARTERIOSCLEROTIC CORONARY ARTERY DISEASE: ICD-10-CM

## 2019-07-16 DIAGNOSIS — J30.9 ALLERGIC RHINITIS, UNSPECIFIED SEASONALITY, UNSPECIFIED TRIGGER: ICD-10-CM

## 2019-07-16 DIAGNOSIS — Z78.0 ASYMPTOMATIC POSTMENOPAUSAL STATE: ICD-10-CM

## 2019-07-16 DIAGNOSIS — I89.0 LYMPHEDEMA OF BOTH LOWER EXTREMITIES: ICD-10-CM

## 2019-07-16 DIAGNOSIS — Z12.11 SCREENING FOR COLORECTAL CANCER: ICD-10-CM

## 2019-07-16 DIAGNOSIS — I10 ESSENTIAL HYPERTENSION: ICD-10-CM

## 2019-07-16 DIAGNOSIS — E11.8 TYPE 2 DIABETES MELLITUS WITH COMPLICATION, UNSPECIFIED WHETHER LONG TERM INSULIN USE: ICD-10-CM

## 2019-07-16 DIAGNOSIS — Z11.59 NEED FOR HEPATITIS C SCREENING TEST: ICD-10-CM

## 2019-07-16 DIAGNOSIS — E66.01 MORBID OBESITY, UNSPECIFIED OBESITY TYPE (HCC): ICD-10-CM

## 2019-07-16 PROCEDURE — 1036F TOBACCO NON-USER: CPT | Performed by: FAMILY MEDICINE

## 2019-07-16 PROCEDURE — 3074F SYST BP LT 130 MM HG: CPT | Performed by: FAMILY MEDICINE

## 2019-07-16 PROCEDURE — 3078F DIAST BP <80 MM HG: CPT | Performed by: FAMILY MEDICINE

## 2019-07-16 PROCEDURE — 1170F FXNL STATUS ASSESSED: CPT | Performed by: FAMILY MEDICINE

## 2019-07-16 PROCEDURE — G0439 PPPS, SUBSEQ VISIT: HCPCS | Performed by: FAMILY MEDICINE

## 2019-07-16 PROCEDURE — 99214 OFFICE O/P EST MOD 30 MIN: CPT | Performed by: FAMILY MEDICINE

## 2019-07-16 PROCEDURE — 1160F RVW MEDS BY RX/DR IN RCRD: CPT | Performed by: FAMILY MEDICINE

## 2019-07-16 PROCEDURE — 3725F SCREEN DEPRESSION PERFORMED: CPT | Performed by: FAMILY MEDICINE

## 2019-07-16 PROCEDURE — 1125F AMNT PAIN NOTED PAIN PRSNT: CPT | Performed by: FAMILY MEDICINE

## 2019-07-16 NOTE — PATIENT INSTRUCTIONS
Obesity   AMBULATORY CARE:   Obesity  is when your body mass index (BMI) is greater than 30  Your healthcare provider will use your height and weight to measure your BMI  The risks of obesity include  many health problems, such as injuries or physical disability  You may need tests to check for the following:  · Diabetes     · High blood pressure or high cholesterol     · Heart disease     · Gallbladder or liver disease     · Cancer of the colon, breast, prostate, liver, or kidney     · Sleep apnea     · Arthritis or gout  Seek care immediately if:   · You have a severe headache, confusion, or difficulty speaking  · You have weakness on one side of your body  · You have chest pain, sweating, or shortness of breath  Contact your healthcare provider if:   · You have symptoms of gallbladder or liver disease, such as pain in your upper abdomen  · You have knee or hip pain and discomfort while walking  · You have symptoms of diabetes, such as intense hunger and thirst, and frequent urination  · You have symptoms of sleep apnea, such as snoring or daytime sleepiness  · You have questions or concerns about your condition or care  Treatment for obesity  focuses on helping you lose weight to improve your health  Even a small decrease in BMI can reduce the risk for many health problems  Your healthcare provider will help you set a weight-loss goal   · Lifestyle changes  are the first step in treating obesity  These include making healthy food choices and getting regular physical activity  Your healthcare provider may suggest a weight-loss program that involves coaching, education, and therapy  · Medicine  may help you lose weight when it is used with a healthy diet and physical activity  · Surgery  can help you lose weight if you are very obese and have other health problems  There are several types of weight-loss surgery  Ask your healthcare provider for more information    Be successful losing weight:   · Set small, realistic goals  An example of a small goal is to walk for 20 minutes 5 days a week  Anther goal is to lose 5% of your body weight  · Tell friends, family members, and coworkers about your goals  and ask for their support  Ask a friend to lose weight with you, or join a weight-loss support group  · Identify foods or triggers that may cause you to overeat , and find ways to avoid them  Remove tempting high-calorie foods from your home and workplace  Place a bowl of fresh fruit on your kitchen counter  If stress causes you to eat, then find other ways to cope with stress  · Keep a diary to track what you eat and drink  Also write down how many minutes of physical activity you do each day  Weigh yourself once a week and record it in your diary  Eating changes: You will need to eat 500 to 1,000 fewer calories each day than you currently eat to lose 1 to 2 pounds a week  The following changes will help you cut calories:  · Eat smaller portions  Use small plates, no larger than 9 inches in diameter  Fill your plate half full of fruits and vegetables  Measure your food using measuring cups until you know what a serving size looks like  · Eat 3 meals and 1 or 2 snacks each day  Plan your meals in advance  Karen Zavaleta and eat at home most of the time  Eat slowly  · Eat fruits and vegetables at every meal   They are low in calories and high in fiber, which makes you feel full  Do not add butter, margarine, or cream sauce to vegetables  Use herbs to season steamed vegetables  · Eat less fat and fewer fried foods  Eat more baked or grilled chicken and fish  These protein sources are lower in calories and fat than red meat  Limit fast food  Dress your salads with olive oil and vinegar instead of bottled dressing  · Limit the amount of sugar you eat  Do not drink sugary beverages  Limit alcohol  Activity changes:  Physical activity is good for your body in many ways   It helps you burn calories and build strong muscles  It decreases stress and depression, and improves your mood  It can also help you sleep better  Talk to your healthcare provider before you begin an exercise program   · Exercise for at least 30 minutes 5 days a week  Start slowly  Set aside time each day for physical activity that you enjoy and that is convenient for you  It is best to do both weight training and an activity that increases your heart rate, such as walking, bicycling, or swimming  · Find ways to be more active  Do yard work and housecleaning  Walk up the stairs instead of using elevators  Spend your leisure time going to events that require walking, such as outdoor festivals or fairs  This extra physical activity can help you lose weight and keep it off  Follow up with your healthcare provider as directed: You may need to meet with a dietitian  Write down your questions so you remember to ask them during your visits  © 2017 [address] Information is for End User's use only and may not be sold, redistributed or otherwise used for commercial purposes  All illustrations and images included in CareNotes® are the copyrighted property of [employer]  or Alvin Washington  The above information is an  only  It is not intended as medical advice for individual conditions or treatments  Talk to your doctor, nurse or pharmacist before following any medical regimen to see if it is safe and effective for you  Urinary Incontinence   WHAT YOU NEED TO KNOW:   What is urinary incontinence? Urinary incontinence (UI) is when you lose control of your bladder  What causes UI? UI occurs because your bladder cannot store or empty urine properly  The following are the most common types of UI:  · Stress incontinence  is when you leak urine due to increased bladder pressure  This may happen when you cough, sneeze, or exercise       · Urge incontinence  is when you feel the need to urinate right away and leak urine accidentally  · Mixed incontinence  is when you have both stress and urge UI  What are the signs and symptoms of UI?   · You feel like your bladder does not empty completely when you urinate  · You urinate often and need to urinate immediately  · You leak urine when you sleep, or you wake up with the urge to urinate  · You leak urine when you cough, sneeze, exercise, or laugh  How is UI diagnosed? Your healthcare provider will ask how often you leak urine and whether you have stress or urge symptoms  Tell him which medicines you take, how often you urinate, and how much liquid you drink each day  You may need any of the following tests:  · Urine tests  may show infection or kidney function  · A pelvic exam  may be done to check for blockages  A pelvic exam will also show if your bladder, uterus, or other organs have moved out of place  · An x-ray, ultrasound, or CT  may show problems with parts of your urinary system  You may be given contrast liquid to help your organs show up better in the pictures  Tell the healthcare provider if you have ever had an allergic reaction to contrast liquid  Do not enter the MRI room with anything metal  Metal can cause serious injury  Tell the healthcare provider if you have any metal in or on your body  · A bladder scan  will show how much urine is left in your bladder after you urinate  You will be asked to urinate and then healthcare providers will use a small ultrasound machine to check the urine left in your bladder  · Cystometry  is used to check the function of your urinary system  Your healthcare provider checks the pressure in your bladder while filling it with fluid  Your bladder pressure may also be tested when your bladder is full and while you urinate  How is UI treated? · Medicines  can help strengthen your bladder control      · Electrical stimulation  is used to send a small amount of electrical energy to your pelvic floor muscles  This helps control your bladder function  Electrodes may be placed outside your body or in your rectum  For women, the electrodes may be placed in the vagina  · A bulking agent  may be injected into the wall of your urethra to make it thicker  This helps keep your urethra closed and decreases urine leakage  · Devices  such as a clamp, pessary, or tampon may help stop urine leaks  Ask your healthcare provider for more information about these and other devices  · Surgery  may be needed if other treatments do not work  Several types of surgery can help improve your bladder control  Ask your healthcare provider for more information about the surgery you may need  How can I manage my symptoms? · Do pelvic muscle exercises often  Your pelvic muscles help you stop urinating  Squeeze these muscles tight for 5 seconds, then relax for 5 seconds  Gradually work up to squeezing for 10 seconds  Do 3 sets of 15 repetitions a day, or as directed  This will help strengthen your pelvic muscles and improve bladder control  · A catheter  may be used to help empty your bladder  A catheter is a tiny, plastic tube that is put into your bladder to drain your urine  Your healthcare provider may tell you to use a catheter to prevent your bladder from getting too full and leaking urine  · Keep a UI record  Write down how often you leak urine and how much you leak  Make a note of what you were doing when you leaked urine  · Train your bladder  Go to the bathroom at set times, such as every 2 hours, even if you do not feel the urge to go  You can also try to hold your urine when you feel the urge to go  For example, hold your urine for 5 minutes when you feel the urge to go  As that becomes easier, hold your urine for 10 minutes  · Drink liquids as directed  Ask your healthcare provider how much liquid to drink each day and which liquids are best for you   You may need to limit the amount of liquid you drink to help control your urine leakage  Limit or do not have drinks that contain caffeine or alcohol  Do not drink any liquid right before you go to bed  · Prevent constipation  Eat a variety of high-fiber foods  Good examples are high-fiber cereals, beans, vegetables, and whole-grain breads  Prune juice may help make your bowel movement softer  Walking is the best way to trigger your intestines to have a bowel movement  · Exercise regularly and maintain a healthy weight  Ask your healthcare provider how much you should weigh and about the best exercise plan for you  Weight loss and exercise will decrease pressure on your bladder and help you control your leakage  Ask him to help you create a weight loss plan if you are overweight  When should I seek immediate care? · You have severe pain  · You are confused or cannot think clearly  When should I contact my healthcare provider? · You have a fever  · You see blood in your urine  · You have pain when you urinate  · You have new or worse pain, even after treatment  · Your mouth feels dry or you have vision changes  · Your urine is cloudy or smells bad  · You have questions or concerns about your condition or care  CARE AGREEMENT:   You have the right to help plan your care  Learn about your health condition and how it may be treated  Discuss treatment options with your caregivers to decide what care you want to receive  You always have the right to refuse treatment  The above information is an  only  It is not intended as medical advice for individual conditions or treatments  Talk to your doctor, nurse or pharmacist before following any medical regimen to see if it is safe and effective for you  © 2017 [address] Information is for End User's use only and may not be sold, redistributed or otherwise used for commercial purposes   All illustrations and images included in CareNotes® are the copyrighted property of A D A M , Inc  or Alvin Washington  Cigarette Smoking and Your Health   AMBULATORY CARE:   Risks to your health if you smoke:  Nicotine and other chemicals found in tobacco damage every cell in your body  Even if you are a light smoker, you have an increased risk for cancer, heart disease, and lung disease  If you are pregnant or have diabetes, smoking increases your risk for complications  Benefits to your health if you stop smoking:   · You decrease respiratory symptoms such as coughing, wheezing, and shortness of breath  · You reduce your risk for cancers of the lung, mouth, throat, kidney, bladder, pancreas, stomach, and cervix  If you already have cancer, you increase the benefits of chemotherapy  You also reduce your risk for cancer returning or a second cancer from developing  · You reduce your risk for heart disease, blood clots, heart attack, and stroke  · You reduce your risk for lung infections, and diseases such as pneumonia, asthma, chronic bronchitis, and emphysema  · Your circulation improves  More oxygen can be delivered to your body  If you have diabetes, you lower your risk for complications, such as kidney, artery, and eye diseases  You also lower your risk for nerve damage  Nerve damage can lead to amputations, poor vision, and blindness  · You improve your body's ability to heal and to fight infections  Benefits to the health of others if you stop smoking:  Tobacco is harmful to nonsmokers who breathe in your secondhand smoke  The following are ways the health of others around you may improve when you stop smoking:  · You lower the risks for lung cancer and heart disease in nonsmoking adults  · If you are pregnant, you lower the risk for miscarriage, early delivery, low birth weight, and stillbirth  You also lower your baby's risk for SIDS, obesity, developmental delay, and neurobehavioral problems, such as ADHD  · If you have children, you lower their risk for ear infections, colds, pneumonia, bronchitis, and asthma  For more information and support to stop smoking:   · Smokefree  gov  Phone: 9- 982 - 920-3748  Web Address: www smokefree  gov  Follow up with your healthcare provider as directed:  Write down your questions so you remember to ask them during your visits  © 2017 [address] Information is for End User's use only and may not be sold, redistributed or otherwise used for commercial purposes  All illustrations and images included in CareNotes® are the copyrighted property of A D A M , Inc  or Alvin Washington  The above information is an  only  It is not intended as medical advice for individual conditions or treatments  Talk to your doctor, nurse or pharmacist before following any medical regimen to see if it is safe and effective for you  Fall Prevention   AMBULATORY CARE:   Fall prevention  includes ways to make your home and other areas safer  It also includes ways you can move more carefully to prevent a fall  Health conditions that cause changes in your blood pressure, vision, or muscle strength and coordination may increase your risk for falls  Medicines may also increase your risk for falls if they make you dizzy, weak, or sleepy  Call 911 or have someone else call if:   · You have fallen and are unconscious  · You have fallen and cannot move part of your body  Contact your healthcare provider if:   · You have fallen and have pain or a headache  · You have questions or concerns about your condition or care  Fall prevention tips:   · Stand or sit up slowly  This may help you keep your balance and prevent falls  · Use assistive devices as directed  Your healthcare provider may suggest that you use a cane or walker to help you keep your balance  You may need to have grab bars put in your bathroom near the toilet or in the shower      · Wear shoes that fit well and have soles that   Wear shoes both inside and outside  Use slippers with good   Do not wear shoes with high heels  · Wear a personal alarm  This is a device that allows you to call 911 if you fall and need help  Ask your healthcare provider for more information  · Stay active  Exercise can help strengthen your muscles and improve your balance  Your healthcare provider may recommend water aerobics or walking  He or she may also recommend physical therapy to improve your coordination  Never start an exercise program without talking to your healthcare provider first      · Manage your medical conditions  Keep all appointments with your healthcare providers  Visit your eye doctor as directed  Home safety tips:   · Add items to prevent falls in the bathroom  Put nonslip strips on your bath or shower floor to prevent you from slipping  Use a bath mat if you do not have carpet in the bathroom  This will prevent you from falling when you step out of the bath or shower  Use a shower seat so you do not need to stand while you shower  Sit on the toilet or a chair in your bathroom to dry yourself and put on clothing  This will prevent you from losing your balance from drying or dressing yourself while you are standing  · Keep paths clear  Remove books, shoes, and other objects from walkways and stairs  Place cords for telephones and lamps out of the way so that you do not need to walk over them  Tape them down if you cannot move them  Remove small rugs  If you cannot remove a rug, secure it with double-sided tape  This will prevent you from tripping  · Install bright lights in your home  Use night lights to help light paths to the bathroom or kitchen  Always turn on the light before you start walking  · Keep items you use often on shelves within reach  Do not use a step stool to help you reach an item  · Paint or place reflective tape on the edges of your stairs    This will help you see the stairs better  Follow up with your healthcare provider as directed:  Write down your questions so you remember to ask them during your visits  © 2017 [address] Information is for End User's use only and may not be sold, redistributed or otherwise used for commercial purposes  All illustrations and images included in CareNotes® are the copyrighted property of A [employer] M [employer] Inc  or Alvin Washington  The above information is an  only  It is not intended as medical advice for individual conditions or treatments  Talk to your doctor, nurse or pharmacist before following any medical regimen to see if it is safe and effective for you  Advance Directives   WHAT YOU NEED TO KNOW:   What are advance directives? Advance directives are legal documents that state your wishes and plans for medical care  These plans are made ahead of time in case you lose your ability to make decisions for yourself  Advance directives can apply to any medical decision, such as the treatments you want, and if you want to donate organs  What are the types of advance directives? There are many types of advance directives, and each state has rules about how to use them  You may choose a combination of any of the following:  · Living will: This is a written record of the treatment you want  You can also choose which treatments you do not want, which to limit, and which to stop at a certain time  This includes surgery, medicine, IV fluid, and tube feedings  · Durable power of  for healthcare [facility]): This is a written record that states who you want to make healthcare choices for you when you are unable to make them for yourself  This person, called a proxy, is usually a family member or a friend  You may choose more than 1 proxy  · Do not resuscitate (DNR) order:  A DNR order is used in case your heart stops beating or you stop breathing   It is a request not to have certain forms of treatment, such as CPR  A DNR order may be included in other types of advance directives  · Medical directive: This covers the care that you want if you are in a coma, near death, or unable to make decisions for yourself  You can list the treatments you want for each condition  Treatment may include pain medicine, surgery, blood transfusions, dialysis, IV or tube feedings, and a ventilator (breathing machine)  · Values history: This document has questions about your views, beliefs, and how you feel and think about life  This information can help others choose the care that you would choose  Why are advance directives important? An advance directive helps you control your care  Although spoken wishes may be used, it is better to have your wishes written down  Spoken wishes can be misunderstood, or not followed  Treatments may be given even if you do not want them  An advance directive may make it easier for your family to make difficult choices about your care  How do I decide what to put in my advance directives? · Make informed decisions:  Make sure you fully understand treatments or care you may receive  Think about the benefits and problems your decisions could cause for you or your family  Talk to healthcare providers if you have concerns or questions before you write down your wishes  You may also want to talk with your Buddhist or , or a   Check your state laws to make sure that what you put in your advance directive is legal      · Sign all forms:  Sign and date your advance directive when you have finished  You may also need 2 witnesses to sign the forms  Witnesses cannot be your doctor or his staff, your spouse, heirs or beneficiaries, people you owe money to, or your chosen proxy  Talk to your family, proxy, and healthcare providers about your advance directive  Give each person a copy, and keep one for yourself in a place you can get to easily   Do not keep it hidden or locked away  · Review and revise your plans: You can revise your advance directive at any time, as long as you are able to make decisions  Review your plan every year, and when there are changes in your life, or your health  When you make changes, let your family, proxy, and healthcare providers know  Give each a new copy  Where can I find more information? · [employer]  [address] , [place] [address]  Phone: 7- 220 - [phone]  Phone: 5- 478 - [phone]  Web Address: http://www  aafp org  · [address] [facility])  [address], [address] [facility] , [address] [place] [address]  Phone: [phone]  Phone: [phone]  Web Address: [place] [address]  CARE AGREEMENT:   You have the right to help plan your care  To help with this plan, you must learn about your health condition and treatment options  You must also learn about advance directives and how they are used  Work with your healthcare providers to decide what care will be used to treat you  You always have the right to refuse treatment  The above information is an  only  It is not intended as medical advice for individual conditions or treatments  Talk to your doctor, nurse or pharmacist before following any medical regimen to see if it is safe and effective for you  © 2017 [address]  Information is for End User's use only and may not be sold, redistributed or otherwise used for commercial purposes  All illustrations and images included in CareNotes® are the copyrighted property of A [employer] M [employer]  or Alvin Washington  Obesity   AMBULATORY CARE:   Obesity  is when your body mass index (BMI) is greater than 30  Your healthcare provider will use your height and weight to measure your BMI  The risks of obesity include  many health problems, such as injuries or physical disability   You may need tests to check for the following:  · Diabetes     · High blood pressure or high cholesterol     · Heart disease     · Gallbladder or liver disease     · Cancer of the colon, breast, prostate, liver, or kidney     · Sleep apnea     · Arthritis or gout  Seek care immediately if:   · You have a severe headache, confusion, or difficulty speaking  · You have weakness on one side of your body  · You have chest pain, sweating, or shortness of breath  Contact your healthcare provider if:   · You have symptoms of gallbladder or liver disease, such as pain in your upper abdomen  · You have knee or hip pain and discomfort while walking  · You have symptoms of diabetes, such as intense hunger and thirst, and frequent urination  · You have symptoms of sleep apnea, such as snoring or daytime sleepiness  · You have questions or concerns about your condition or care  Treatment for obesity  focuses on helping you lose weight to improve your health  Even a small decrease in BMI can reduce the risk for many health problems  Your healthcare provider will help you set a weight-loss goal   · Lifestyle changes  are the first step in treating obesity  These include making healthy food choices and getting regular physical activity  Your healthcare provider may suggest a weight-loss program that involves coaching, education, and therapy  · Medicine  may help you lose weight when it is used with a healthy diet and physical activity  · Surgery  can help you lose weight if you are very obese and have other health problems  There are several types of weight-loss surgery  Ask your healthcare provider for more information  Be successful losing weight:   · Set small, realistic goals  An example of a small goal is to walk for 20 minutes 5 days a week  Anther goal is to lose 5% of your body weight  · Tell friends, family members, and coworkers about your goals  and ask for their support   Ask a friend to lose weight with you, or join a weight-loss support group  · Identify foods or triggers that may cause you to overeat , and find ways to avoid them  Remove tempting high-calorie foods from your home and workplace  Place a bowl of fresh fruit on your kitchen counter  If stress causes you to eat, then find other ways to cope with stress  · Keep a diary to track what you eat and drink  Also write down how many minutes of physical activity you do each day  Weigh yourself once a week and record it in your diary  Eating changes: You will need to eat 500 to 1,000 fewer calories each day than you currently eat to lose 1 to 2 pounds a week  The following changes will help you cut calories:  · Eat smaller portions  Use small plates, no larger than 9 inches in diameter  Fill your plate half full of fruits and vegetables  Measure your food using measuring cups until you know what a serving size looks like  · Eat 3 meals and 1 or 2 snacks each day  Plan your meals in advance  [facility] and eat at home most of the time  Eat slowly  · Eat fruits and vegetables at every meal   They are low in calories and high in fiber, which makes you feel full  Do not add butter, margarine, or cream sauce to vegetables  Use herbs to season steamed vegetables  · Eat less fat and fewer fried foods  Eat more baked or grilled chicken and fish  These protein sources are lower in calories and fat than red meat  Limit fast food  Dress your salads with olive oil and vinegar instead of bottled dressing  · Limit the amount of sugar you eat  Do not drink sugary beverages  Limit alcohol  Activity changes:  Physical activity is good for your body in many ways  It helps you burn calories and build strong muscles  It decreases stress and depression, and improves your mood  It can also help you sleep better  Talk to your healthcare provider before you begin an exercise program   · Exercise for at least 30 minutes 5 days a week  Start slowly   Set aside time each day for physical activity that you enjoy and that is convenient for you  It is best to do both weight training and an activity that increases your heart rate, such as walking, bicycling, or swimming  · Find ways to be more active  Do yard work and housecleaning  Walk up the stairs instead of using elevators  Spend your leisure time going to events that require walking, such as outdoor festivals or fairs  This extra physical activity can help you lose weight and keep it off  Follow up with your healthcare provider as directed: You may need to meet with a dietitian  Write down your questions so you remember to ask them during your visits  © 2017 [address] Information is for End User's use only and may not be sold, redistributed or otherwise used for commercial purposes  All illustrations and images included in CareNotes® are the copyrighted property of [employer]  or Alvin Washington  The above information is an  only  It is not intended as medical advice for individual conditions or treatments  Talk to your doctor, nurse or pharmacist before following any medical regimen to see if it is safe and effective for you  Urinary Incontinence   WHAT YOU NEED TO KNOW:   What is urinary incontinence? Urinary incontinence (UI) is when you lose control of your bladder  What causes UI? UI occurs because your bladder cannot store or empty urine properly  The following are the most common types of UI:  · Stress incontinence  is when you leak urine due to increased bladder pressure  This may happen when you cough, sneeze, or exercise  · Urge incontinence  is when you feel the need to urinate right away and leak urine accidentally  · Mixed incontinence  is when you have both stress and urge UI  What are the signs and symptoms of UI?   · You feel like your bladder does not empty completely when you urinate  · You urinate often and need to urinate immediately      · You leak urine when you sleep, or you wake up with the urge to urinate  · You leak urine when you cough, sneeze, exercise, or laugh  How is UI diagnosed? Your healthcare provider will ask how often you leak urine and whether you have stress or urge symptoms  Tell him which medicines you take, how often you urinate, and how much liquid you drink each day  You may need any of the following tests:  · Urine tests  may show infection or kidney function  · A pelvic exam  may be done to check for blockages  A pelvic exam will also show if your bladder, uterus, or other organs have moved out of place  · An x-ray, ultrasound, or CT  may show problems with parts of your urinary system  You may be given contrast liquid to help your organs show up better in the pictures  Tell the healthcare provider if you have ever had an allergic reaction to contrast liquid  Do not enter the MRI room with anything metal  Metal can cause serious injury  Tell the healthcare provider if you have any metal in or on your body  · A bladder scan  will show how much urine is left in your bladder after you urinate  You will be asked to urinate and then healthcare providers will use a small ultrasound machine to check the urine left in your bladder  · Cystometry  is used to check the function of your urinary system  Your healthcare provider checks the pressure in your bladder while filling it with fluid  Your bladder pressure may also be tested when your bladder is full and while you urinate  How is UI treated? · Medicines  can help strengthen your bladder control  · Electrical stimulation  is used to send a small amount of electrical energy to your pelvic floor muscles  This helps control your bladder function  Electrodes may be placed outside your body or in your rectum  For women, the electrodes may be placed in the vagina  · A bulking agent  may be injected into the wall of your urethra to make it thicker   This helps keep your urethra closed and decreases urine leakage  · Devices  such as a clamp, pessary, or tampon may help stop urine leaks  Ask your healthcare provider for more information about these and other devices  · Surgery  may be needed if other treatments do not work  Several types of surgery can help improve your bladder control  Ask your healthcare provider for more information about the surgery you may need  How can I manage my symptoms? · Do pelvic muscle exercises often  Your pelvic muscles help you stop urinating  Squeeze these muscles tight for 5 seconds, then relax for 5 seconds  Gradually work up to squeezing for 10 seconds  Do 3 sets of 15 repetitions a day, or as directed  This will help strengthen your pelvic muscles and improve bladder control  · A catheter  may be used to help empty your bladder  A catheter is a tiny, plastic tube that is put into your bladder to drain your urine  Your healthcare provider may tell you to use a catheter to prevent your bladder from getting too full and leaking urine  · Keep a UI record  Write down how often you leak urine and how much you leak  Make a note of what you were doing when you leaked urine  · Train your bladder  Go to the bathroom at set times, such as every 2 hours, even if you do not feel the urge to go  You can also try to hold your urine when you feel the urge to go  For example, hold your urine for 5 minutes when you feel the urge to go  As that becomes easier, hold your urine for 10 minutes  · Drink liquids as directed  Ask your healthcare provider how much liquid to drink each day and which liquids are best for you  You may need to limit the amount of liquid you drink to help control your urine leakage  Limit or do not have drinks that contain caffeine or alcohol  Do not drink any liquid right before you go to bed  · Prevent constipation  Eat a variety of high-fiber foods   Good examples are high-fiber cereals, beans, vegetables, and whole-grain breads  Prune juice may help make your bowel movement softer  Walking is the best way to trigger your intestines to have a bowel movement  · Exercise regularly and maintain a healthy weight  Ask your healthcare provider how much you should weigh and about the best exercise plan for you  Weight loss and exercise will decrease pressure on your bladder and help you control your leakage  Ask him to help you create a weight loss plan if you are overweight  When should I seek immediate care? · You have severe pain  · You are confused or cannot think clearly  When should I contact my healthcare provider? · You have a fever  · You see blood in your urine  · You have pain when you urinate  · You have new or worse pain, even after treatment  · Your mouth feels dry or you have vision changes  · Your urine is cloudy or smells bad  · You have questions or concerns about your condition or care  CARE AGREEMENT:   You have the right to help plan your care  Learn about your health condition and how it may be treated  Discuss treatment options with your caregivers to decide what care you want to receive  You always have the right to refuse treatment  The above information is an  only  It is not intended as medical advice for individual conditions or treatments  Talk to your doctor, nurse or pharmacist before following any medical regimen to see if it is safe and effective for you  © 2017 [address]  Information is for End User's use only and may not be sold, redistributed or otherwise used for commercial purposes  All illustrations and images included in CareNotes® are the copyrighted property of [employer]  or Alvin Felix  Cigarette Smoking and Your Health   AMBULATORY CARE:   Risks to your health if you smoke:  Nicotine and other chemicals found in tobacco damage every cell in your body   Even if you are a light smoker, you have an increased risk for cancer, heart disease, and lung disease  If you are pregnant or have diabetes, smoking increases your risk for complications  Benefits to your health if you stop smoking:   · You decrease respiratory symptoms such as coughing, wheezing, and shortness of breath  · You reduce your risk for cancers of the lung, mouth, throat, kidney, bladder, pancreas, stomach, and cervix  If you already have cancer, you increase the benefits of chemotherapy  You also reduce your risk for cancer returning or a second cancer from developing  · You reduce your risk for heart disease, blood clots, heart attack, and stroke  · You reduce your risk for lung infections, and diseases such as pneumonia, asthma, chronic bronchitis, and emphysema  · Your circulation improves  More oxygen can be delivered to your body  If you have diabetes, you lower your risk for complications, such as kidney, artery, and eye diseases  You also lower your risk for nerve damage  Nerve damage can lead to amputations, poor vision, and blindness  · You improve your body's ability to heal and to fight infections  Benefits to the health of others if you stop smoking:  Tobacco is harmful to nonsmokers who breathe in your secondhand smoke  The following are ways the health of others around you may improve when you stop smoking:  · You lower the risks for lung cancer and heart disease in nonsmoking adults  · If you are pregnant, you lower the risk for miscarriage, early delivery, low birth weight, and stillbirth  You also lower your baby's risk for SIDS, obesity, developmental delay, and neurobehavioral problems, such as ADHD  · If you have children, you lower their risk for ear infections, colds, pneumonia, bronchitis, and asthma  For more information and support to stop smoking:   · [employer]  Phone: 6- 598 - 755-3336  Web Address: www [employer]  Follow up with your healthcare provider as directed: Write down your questions so you remember to ask them during your visits  © 2017 [facility] Information is for End User's use only and may not be sold, redistributed or otherwise used for commercial purposes  All illustrations and images included in CareNotes® are the copyrighted property of [employer]  or Alvin Washington  The above information is an  only  It is not intended as medical advice for individual conditions or treatments  Talk to your doctor, nurse or pharmacist before following any medical regimen to see if it is safe and effective for you  Fall Prevention   AMBULATORY CARE:   Fall prevention  includes ways to make your home and other areas safer  It also includes ways you can move more carefully to prevent a fall  Health conditions that cause changes in your blood pressure, vision, or muscle strength and coordination may increase your risk for falls  Medicines may also increase your risk for falls if they make you dizzy, weak, or sleepy  Call 911 or have someone else call if:   · You have fallen and are unconscious  · You have fallen and cannot move part of your body  Contact your healthcare provider if:   · You have fallen and have pain or a headache  · You have questions or concerns about your condition or care  Fall prevention tips:   · Stand or sit up slowly  This may help you keep your balance and prevent falls  · Use assistive devices as directed  Your healthcare provider may suggest that you use a cane or walker to help you keep your balance  You may need to have grab bars put in your bathroom near the toilet or in the shower  · Wear shoes that fit well and have soles that   Wear shoes both inside and outside  Use slippers with good   Do not wear shoes with high heels  · Wear a personal alarm  This is a device that allows you to call 911 if you fall and need help   Ask your healthcare provider for more information  · Stay active  Exercise can help strengthen your muscles and improve your balance  Your healthcare provider may recommend water aerobics or walking  He or she may also recommend physical therapy to improve your coordination  Never start an exercise program without talking to your healthcare provider first      · Manage your medical conditions  Keep all appointments with your healthcare providers  Visit your eye doctor as directed  Home safety tips:   · Add items to prevent falls in the bathroom  Put nonslip strips on your bath or shower floor to prevent you from slipping  Use a bath mat if you do not have carpet in the bathroom  This will prevent you from falling when you step out of the bath or shower  Use a shower seat so you do not need to stand while you shower  Sit on the toilet or a chair in your bathroom to dry yourself and put on clothing  This will prevent you from losing your balance from drying or dressing yourself while you are standing  · Keep paths clear  Remove books, shoes, and other objects from walkways and stairs  Place cords for telephones and lamps out of the way so that you do not need to walk over them  Tape them down if you cannot move them  Remove small rugs  If you cannot remove a rug, secure it with double-sided tape  This will prevent you from tripping  · Install bright lights in your home  Use night lights to help light paths to the bathroom or kitchen  Always turn on the light before you start walking  · Keep items you use often on shelves within reach  Do not use a step stool to help you reach an item  · Paint or place reflective tape on the edges of your stairs  This will help you see the stairs better  Follow up with your healthcare provider as directed:  Write down your questions so you remember to ask them during your visits     © 2017 [address] Information is for End User's use only and may not be sold, redistributed or otherwise used for commercial purposes  All illustrations and images included in CareNotes® are the copyrighted property of [employer]  or Alvin Washington  The above information is an  only  It is not intended as medical advice for individual conditions or treatments  Talk to your doctor, nurse or pharmacist before following any medical regimen to see if it is safe and effective for you  Advance Directives   WHAT YOU NEED TO KNOW:   What are advance directives? Advance directives are legal documents that state your wishes and plans for medical care  These plans are made ahead of time in case you lose your ability to make decisions for yourself  Advance directives can apply to any medical decision, such as the treatments you want, and if you want to donate organs  What are the types of advance directives? There are many types of advance directives, and each state has rules about how to use them  You may choose a combination of any of the following:  · Living will: This is a written record of the treatment you want  You can also choose which treatments you do not want, which to limit, and which to stop at a certain time  This includes surgery, medicine, IV fluid, and tube feedings  · Durable power of  for healthcare [facility]): This is a written record that states who you want to make healthcare choices for you when you are unable to make them for yourself  This person, called a proxy, is usually a family member or a friend  You may choose more than 1 proxy  · Do not resuscitate (DNR) order:  A DNR order is used in case your heart stops beating or you stop breathing  It is a request not to have certain forms of treatment, such as CPR  A DNR order may be included in other types of advance directives  · Medical directive: This covers the care that you want if you are in a coma, near death, or unable to make decisions for yourself   You can list the treatments you want for each condition  Treatment may include pain medicine, surgery, blood transfusions, dialysis, IV or tube feedings, and a ventilator (breathing machine)  · Values history: This document has questions about your views, beliefs, and how you feel and think about life  This information can help others choose the care that you would choose  Why are advance directives important? An advance directive helps you control your care  Although spoken wishes may be used, it is better to have your wishes written down  Spoken wishes can be misunderstood, or not followed  Treatments may be given even if you do not want them  An advance directive may make it easier for your family to make difficult choices about your care  How do I decide what to put in my advance directives? · Make informed decisions:  Make sure you fully understand treatments or care you may receive  Think about the benefits and problems your decisions could cause for you or your family  Talk to healthcare providers if you have concerns or questions before you write down your wishes  You may also want to talk with your Restorationist or , or a   Check your state laws to make sure that what you put in your advance directive is legal      · Sign all forms:  Sign and date your advance directive when you have finished  You may also need 2 witnesses to sign the forms  Witnesses cannot be your doctor or his staff, your spouse, heirs or beneficiaries, people you owe money to, or your chosen proxy  Talk to your family, proxy, and healthcare providers about your advance directive  Give each person a copy, and keep one for yourself in a place you can get to easily  Do not keep it hidden or locked away  · Review and revise your plans: You can revise your advance directive at any time, as long as you are able to make decisions  Review your plan every year, and when there are changes in your life, or your health   When you make changes, let your family, proxy, and healthcare providers know  Give each a new copy  Where can I find more information? · [employer]  [address] , [place] [address]  Phone: 6- 525 - [phone]  Phone: 3- 508 - [phone]  Web Address: http://www  aa org  · [address] [facility])  [address], [address] [facility] [address]  Phone: [phone]  Phone: [phone]  Web Address: [place]  CARE AGREEMENT:   You have the right to help plan your care  To help with this plan, you must learn about your health condition and treatment options  You must also learn about advance directives and how they are used  Work with your healthcare providers to decide what care will be used to treat you  You always have the right to refuse treatment  The above information is an  only  It is not intended as medical advice for individual conditions or treatments  Talk to your doctor, nurse or pharmacist before following any medical regimen to see if it is safe and effective for you  © 2017 [address]  Information is for End User's use only and may not be sold, redistributed or otherwise used for commercial purposes  All illustrations and images included in CareNotes® are the copyrighted property of [employer]  or Alvin Washington

## 2019-07-16 NOTE — PROGRESS NOTES
Assessment/Plan:    Type 2 diabetes mellitus ([place] [address] )  Lab Results   Component Value Date    HGBA1C 8 3 (H) 04/06/2018     Has been with poor control though pt has been doing better with diet since last visit  I reviewed treatment options  Discussed diet, weight loss and exercise strategies  BMI Counseling: Body mass index is 43 63 kg/m²  Discussed the patient's BMI with her  The BMI is above average  BMI counseling and education was provided to the patient  Nutrition recommendations include reducing portion sizes, consuming healthier snacks, moderation in carbohydrate intake, increasing intake of lean protein and reducing intake of saturated fat and trans fat  Exercise limited by back issues  Recheck labs  Further recommendations based on results        Allergic rhinitis  With persistent congestion  Trial of loratadine OTC  Recheck 1 week if not improved    Arteriosclerotic coronary artery disease  Asymptomatic  Check labs  Cont present meds  F/u with Cardio  Recheck 3m    Hypertension  Well controlled  Cont present treatment  Monitor labs  Recheck 6m      Hyperlipidemia  With hxof CAD  Check labs  Cont present meds  Urged low fat/chol diet  Recheck 3-6m    Lymphedema of both lower extremities  Stable  Cont conservative care  Encouraged weight loss  Recheck 6m    Morbid obesity, unspecified obesity type ([place] [address] )  BMI Counseling: Body mass index is 43 63 kg/m²  Discussed the patient's BMI with her  The BMI is above average  BMI counseling and education was provided to the patient  Nutrition recommendations include reducing portion sizes, consuming healthier snacks, moderation in carbohydrate intake, increasing intake of lean protein and reducing intake of saturated fat and trans fat             Diagnoses and all orders for this visit:    Medicare annual wellness visit, subsequent    Type 2 diabetes mellitus with complication, unspecified whether long term insulin use ([place] [address] )  -     Cancel: POCT hemoglobin A1c  - Comprehensive metabolic panel; Future  -     Hemoglobin A1C; Future  -     Lipid panel; Future  -     Microalbumin / creatinine urine ratio; Future    Arteriosclerotic coronary artery disease  -     CBC and differential; Future    Essential hypertension  -     CBC and differential; Future    Pure hypercholesterolemia    Lymphedema of both lower extremities    Morbid obesity, unspecified obesity type ([facility] )  -     TSH, 3rd generation with Free T4 reflex; Future    Need for hepatitis C screening test  -     Hepatitis C antibody; Future    Screening for colorectal cancer  -     Cologuard; Future    Asymptomatic postmenopausal state  -     DXA bone density spine hip and pelvis; Future    Allergic rhinitis, unspecified seasonality, unspecified trigger          Subjective:      Patient ID: Peace Contreras is a 70 y o  female  f/u multiple med issues  - pt has been working on her diet since last visit  Has lost > 10lbs since March  Does not monitor BGs at home  Overdue for eye exam  Exercise is limited due to back pain  - pt denies CP, palpitations, lightheadedness or other CV symptoms with or without exertion  Seen by Cardio in March  Due for f/u 1 year  Due for labs  - has a lot of PND / allergy symptoms  No fever/chills  Occ sinus pressure  Occ cough with occ production  - no GI complaints  Never had colonoscopy and is still reluctant to go  May be willing to try cologuard  - still refuses mammogram  - still with low back pain  Uses wraps for lower leg lymphedema  - AWV done      The following portions of the patient's history were reviewed and updated as appropriate: She  has a past medical history of Bereavement, uncomplicated, Coronary artery disease, Diabetes mellitus ([facility] ), Hyperlipidemia, Hypertension, Lymphedema, Morbid obesity ([facility] [address] [address] ), and Myocardial infarction ([facility] [address] )    She   Patient Active Problem List    Diagnosis Date Noted    Mor[facility] obesity, unspecified [facility] type ([facility] ) 10/05/2016    Lymphedema of both lower extremities 11/06/2015    Type 2 diabetes mellitus [facility] ) 10/26/2015    Hyperlipidemia 12/06/2013    Allergic rhinitis 09/27/2013    Arteriosclerotic coronary artery disease 07/31/2012    Hypertension 07/31/2012    Esophageal reflux 07/31/2012     She  has a past surgical history that includes Cardiac catheterization; Hip surgery; and Tubal ligation  She  reports that she has never smoked  She has never used smokeless tobacco  She reports that she does not drink alcohol or use drugs  Current Outpatient Medications   Medication Sig Dispense Refill    amLODIPine (NORVASC) 5 mg tablet Take 1 tablet (5 mg total) by mouth daily 90 tablet 2    Ascorbic Acid (VITAMIN C) 500 MG CAPS Take 1 tablet by mouth daily      aspirin 81 MG tablet Take 1 tablet by mouth as needed Take 1 tablet weekly       hydrochlorothiazide (HYDRODIURIL) 25 mg tablet Take 1 tablet (25 mg total) by mouth daily 90 tablet 2    lisinopril (ZESTRIL) 40 mg tablet Take 1 tablet (40 mg total) by mouth daily 90 tablet 2    metFORMIN (GLUCOPHAGE) 500 mg tablet Take 1 tablet (500 mg total) by mouth 2 (two) times a day 60 tablet 3    metoprolol succinate (TOPROL-XL) 100 mg 24 hr tablet Take 1 tablet (100 mg total) by mouth daily 90 tablet 3    multivitamin (THERAGRAN) TABS Take 1 tablet by mouth daily      terconazole (TERAZOL 7) 0 4 % vaginal cream Insert 1 applicator into the vagina daily at bedtime 45 g 1     No current facility-administered medications for this visit  She is allergic to sulfa antibiotics       Review of Systems   Constitutional: Negative  HENT: Positive for congestion and sinus pressure  Negative for ear discharge, ear pain, sore throat, trouble swallowing and voice change  Eyes: Negative  Respiratory: Negative  Cardiovascular: Negative  Gastrointestinal: Negative  Endocrine: Negative  Genitourinary: Negative  Musculoskeletal: Positive for arthralgias, back pain and gait problem   Negative for joint swelling, neck pain and neck stiffness  Allergic/Immunologic: Negative  Neurological: Negative for dizziness, weakness, light-headedness, numbness and headaches  Hematological: Negative  Psychiatric/Behavioral: Negative  Objective:      /74 (BP Location: Right arm, Patient Position: Sitting, Cuff Size: Standard)   Pulse 80   Temp 97 8 °F (36 6 °C)   Ht 4' 11" (1 499 m)   Wt 98 kg (216 lb)   BMI 43 63 kg/m²          Physical Exam   Constitutional: She is oriented to person, place, and time  She appears well-developed and well-nourished  HENT:   Head: Normocephalic and atraumatic  Right Ear: External ear normal    Left Ear: External ear normal    Mouth/Throat: Oropharynx is clear and moist  No oropharyngeal exudate  Mild congestion  Sinuses NT   Eyes: Pupils are equal, round, and reactive to light  Conjunctivae and EOM are normal    Neck: Normal range of motion  Neck supple  No JVD present  No thyromegaly present  Cardiovascular: Normal rate, regular rhythm and intact distal pulses  Pulses are no weak pulses  No murmur heard  Pulses:       Dorsalis pedis pulses are 1+ on the right side, and 1+ on the left side  Pulmonary/Chest: Effort normal and breath sounds normal    Abdominal: Soft  She exhibits no distension  There is no tenderness  Musculoskeletal: Normal range of motion  Feet:   Right Foot:   Skin Integrity: Negative for ulcer, skin breakdown, erythema, warmth, callus or dry skin  Left Foot:   Skin Integrity: Negative for ulcer, skin breakdown, erythema, warmth, callus or dry skin  Lymphadenopathy:     She has no cervical adenopathy  Neurological: She is alert and oriented to person, place, and time  She has normal reflexes  She displays normal reflexes  She exhibits normal muscle tone  Coordination normal    Minicog 5/5   Skin: Skin is warm and dry  She is not diaphoretic  Psychiatric: She has a normal mood and affect     PHQ-2 = 0       Patient's shoes and socks removed  Right Foot/Ankle   Right Foot Inspection  Skin Exam: skin normal and skin intact no dry skin, no warmth, no callus, no erythema, no maceration, no abnormal color, no pre-ulcer, no ulcer and no callus                          Toe Exam: ROM and strength within normal limits  Sensory   Vibration: intact    Monofilament testing: intact  Vascular  Capillary refills: < 3 seconds  The right DP pulse is 1+  Left Foot/Ankle  Left Foot Inspection  Skin Exam: skin normal and skin intactno dry skin, no warmth, no erythema, no maceration, normal color, no pre-ulcer, no ulcer and no callus                         Toe Exam: ROM and strength within normal limits                   Sensory   Vibration: intact    Monofilament: intact  Vascular  Capillary refills: < 3 seconds  The left DP pulse is 1+  Assign Risk Category:  No deformity present; No loss of protective sensation;  No weak pulses       Risk: 0

## 2019-07-16 NOTE — PROGRESS NOTES
Assessment and Plan:     Problem List Items Addressed This Visit        Endocrine    Type 2 diabetes mellitus ([facility] )     Lab Results   Component Value Date    HGBA1C 8 3 (H) 04/06/2018     Has been with poor control though pt has been doing better with diet since last visit  I reviewed treatment options  Discussed diet, weight loss and exercise strategies  BMI Counseling: Body mass index is 43 63 kg/m²  Discussed the patient's BMI with her  The BMI is above average  BMI counseling and education was provided to the patient  Nutrition recommendations include reducing portion sizes, consuming healthier snacks, moderation in carbohydrate intake, increasing intake of lean protein and reducing intake of saturated fat and trans fat  Exercise limited by back issues  Recheck labs  Further recommendations based on results             Relevant Orders    Comprehensive metabolic panel    Hemoglobin A1C    Lipid panel    Microalbumin / creatinine urine ratio       Respiratory    Allergic rhinitis     With persistent congestion  Trial of loratadine OTC  Recheck 1 week if not improved            Cardiovascular and Mediastinum    Arteriosclerotic coronary artery disease     Asymptomatic  Check labs  Cont present meds  F/u with Cardio  Recheck 3m         Relevant Orders    CBC and differential    Hypertension     Well controlled  Cont present treatment  Monitor labs  Recheck 6m           Relevant Orders    CBC and differential       Other    Hyperlipidemia     With hxof CAD  Check labs  Cont present meds  Urged low fat/chol diet  Recheck 3-6m         Lymphedema of both lower extremities     Stable  Cont conservative care  Encouraged weight loss  Recheck 6m         Morbid obesity, unspecified obesity type ([facility] )     BMI Counseling: Body mass index is 43 63 kg/m²  Discussed the patient's BMI with her  The BMI is above average  BMI counseling and education was provided to the patient   Nutrition recommendations include reducing portion sizes, consuming healthier snacks, moderation in carbohydrate intake, increasing intake of lean protein and reducing intake of saturated fat and trans fat               Relevant Orders    TSH, 3rd generation with Free T4 reflex      Other Visit Diagnoses     Medicare annual wellness visit, subsequent    -  Primary    Need for hepatitis C screening test        Relevant Orders    Hepatitis C antibody    Screening for colorectal cancer        Relevant Orders    Cologuard    Asymptomatic postmenopausal state        Relevant Orders    DXA bone density spine hip and pelvis         History of Present Illness:     Patient presents for Medicare Annual Wellness visit    Patient Care Team:  Johnie Danielle MD as PCP - MD Asiya Leon MD Custer Mary, MD     Problem List:     Patient Active Problem List   Diagnosis    Arteriosclerotic coronary artery disease    Hypertension    Hyperlipidemia    Morbid obesity, unspecified obesity type ([facility] )    Allergic rhinitis    Esophageal reflux    Type 2 diabetes mellitus ([facility] )    Lymphedema of both lower extremities      Past Medical and Surgical History:     Past Medical History:   Diagnosis Date    Bereavement, uncomplicated     [id number]  RESOLVED    Coronary artery disease     Diabetes mellitus ([address] )     Hyperlipidemia     Hypertension     Lymphedema     Morbid obesity ([facility] )     Myocardial infarction ([facility] )      Past Surgical History:   Procedure Laterality Date    CARDIAC CATHETERIZATION      HIP SURGERY      TUBAL LIGATION        Family History:     Family History   Problem Relation Age of Onset    Heart disease Mother     Diabetes type II Mother     Hypertension Mother     Heart disease Sister     Diabetes type II Sister     Diabetes type II Brother     Diabetes type II Family       Social History:     Social History     Tobacco Use   Smoking Status Never Smoker   Smokeless Tobacco Never Used     Social History Substance and Sexual Activity   Alcohol Use No     Social History     Substance and Sexual Activity   Drug Use Never      Medications and Allergies:     Current Outpatient Medications   Medication Sig Dispense Refill    amLODIPine (NORVASC) 5 mg tablet Take 1 tablet (5 mg total) by mouth daily 90 tablet 2    Ascorbic Acid (VITAMIN C) 500 MG CAPS Take 1 tablet by mouth daily      aspirin 81 MG tablet Take 1 tablet by mouth as needed Take 1 tablet weekly       hydrochlorothiazide (HYDRODIURIL) 25 mg tablet Take 1 tablet (25 mg total) by mouth daily 90 tablet 2    lisinopril (ZESTRIL) 40 mg tablet Take 1 tablet (40 mg total) by mouth daily 90 tablet 2    metFORMIN (GLUCOPHAGE) 500 mg tablet Take 1 tablet (500 mg total) by mouth 2 (two) times a day 60 tablet 3    metoprolol succinate (TOPROL-XL) 100 mg 24 hr tablet Take 1 tablet (100 mg total) by mouth daily 90 tablet 3    multivitamin (THERAGRAN) TABS Take 1 tablet by mouth daily      terconazole (TERAZOL 7) 0 4 % vaginal cream Insert 1 applicator into the vagina daily at bedtime 45 g 1     No current facility-administered medications for this visit  Allergies   Allergen Reactions    Sulfa Antibiotics Other (See Comments)      Immunizations:     Immunization History   Administered Date(s) Administered    Tdap 06/04/2008, 01/19/2015      Medicare Screening Tests and Risk Assessments:     Jaidabernice Kinney is here for her Subsequent Wellness visit  Health Risk Assessment:  Patient rates overall health as very good  Patient feels that their physical health rating is Much better  Eyesight was rated as Same  Hearing was rated as Same  Patient feels that their emotional and mental health rating is Same  Pain experienced by patient in the last 7 days has been Some  Patient's pain rating has been 8/10  Patient states that she has experienced no weight loss or gain in last 6 months  Emotional/Mental Health:  Patient has not been feeling nervous/anxious  PHQ-9 Depression Screening:    Frequency of the following problems over the past two weeks:      1  Little interest or pleasure in doing things: 0 - not at all      2  Feeling down, depressed, or hopeless: 0 - not at all  PHQ-2 Score: 0          Broken Bones/Falls: Fall Risk Assessment:    In the past year, patient has experienced: No history of falling in past year          Bladder/Bowel:  Patient has not leaked urine accidently in the last six months  Patient reports no loss of bowel control  Immunizations:  Patient has not had a flu vaccination within the last year  Patient has not received a pneumonia shot  Patient has not received a shingles shot  Patient has not received tetanus/diphtheria shot  Home Safety:  Patient does not have trouble with stairs inside or outside of their home  Patient currently reports that there are no safety hazards present in home, working smoke alarms, no working carbon monoxide detectors  Preventative Screenings:   No breast cancer screening performed, no colon cancer screen completed, cholesterol screen completed, glaucoma eye exam completed,     Nutrition:  Current diet: Regular with servings of the following:    Medications:  Patient is currently taking over-the-counter supplements  Patient is able to manage medications  Lifestyle Choices:  Patient reports no tobacco use  Patient has not smoked or used tobacco in the past   Patient reports alcohol use  Alcohol use per week: <1/week  Patient drives a vehicle  Patient wears seat belt  Current level of exercise of physical activity described by patient as: sedentary          Activities of Daily Living:  Can get out of bed by his or her self, able to dress self, able to make own meals, able to do own shopping, able to bathe self, can do own laundry/housekeeping, can manage own money, pay bills and track expenses    Previous Hospitalizations:  No hospitalization or ED visit in past 12 months        Advanced Directives:  Patient has not decided on power of   Patient has not completed advanced directive  Preventative Screening/Counseling:      Cardiovascular:      Counseling: Healthy Diet, Healthy Weight, Improve Cholesterol, Improve Blood Pressure and Improve Exercise Tolerance     Due for Labs/Analytes/Optional EKG: Lipid Panel      Comments: Pt has CAD        Diabetes:      General: Screening Not Indicated      Comments: Pt is diabetic        Colorectal Cancer:      General: Risks and Benefits Discussed      Comments: Cologuard ordered        Breast Cancer:      General: Risks and Benefits Discussed          Cervical Cancer:      General: Risks and Benefits Discussed and Patient Declines          Osteoporosis:      General: Risks and Benefits Discussed      Counseling: Calcium and Vitamin D Intake and Regular Weightbearing Exercise      Due for studies: DXA Appendicular          AAA:      General: Screening Not Indicated          Glaucoma:      General: Risks and Benefits Discussed and Screening Current          HIV:      General: Screening Not Indicated          Hepatitis C:      General: Risks and Benefits Discussed      Counseling: has received general HCV counseling        Advanced Directives:   Patient has no living will for healthcare, does not have durable POA for healthcare, patient does not have an advanced directive  Information on ACP and/or AD provided  5 wishes given  Immunizations:      Influenza: Risks & Benefits Discussed and Patient Declines      Pneumococcal: Risks & Benefits Discussed and Patient Declines      Shingrix: Risks & Benefits Discussed and Patient Declines      TD: Patient Declines and Risks & Benefits Discussed      Other Preventative Counseling (Non-Medicare):   Increase physical activity, Nutrition Counseling and Weight reduction discussed

## 2019-07-17 NOTE — ASSESSMENT & PLAN NOTE
BMI Counseling: Body mass index is 43 63 kg/m²  Discussed the patient's BMI with her  The BMI is above average  BMI counseling and education was provided to the patient  Nutrition recommendations include reducing portion sizes, consuming healthier snacks, moderation in carbohydrate intake, increasing intake of lean protein and reducing intake of saturated fat and trans fat

## 2019-07-17 NOTE — ASSESSMENT & PLAN NOTE
Lab Results   Component Value Date    HGBA1C 8 3 (H) 04/06/2018     Has been with poor control though pt has been doing better with diet since last visit  I reviewed treatment options  Discussed diet, weight loss and exercise strategies  BMI Counseling: Body mass index is 43 63 kg/m²  Discussed the patient's BMI with her  The BMI is above average  BMI counseling and education was provided to the patient  Nutrition recommendations include reducing portion sizes, consuming healthier snacks, moderation in carbohydrate intake, increasing intake of lean protein and reducing intake of saturated fat and trans fat  Exercise limited by back issues  Recheck labs   Further recommendations based on results

## 2019-08-01 DIAGNOSIS — I10 HYPERTENSION, UNSPECIFIED TYPE: ICD-10-CM

## 2019-08-02 RX ORDER — AMLODIPINE BESYLATE 5 MG/1
5 TABLET ORAL DAILY
Qty: 90 TABLET | Refills: 3 | OUTPATIENT
Start: 2019-08-02 | End: 2020-04-21 | Stop reason: SDUPTHER

## 2019-08-02 RX ORDER — LISINOPRIL 40 MG/1
40 TABLET ORAL DAILY
Qty: 90 TABLET | Refills: 3 | OUTPATIENT
Start: 2019-08-02 | End: 2020-04-21 | Stop reason: SDUPTHER

## 2019-09-20 DIAGNOSIS — I10 ESSENTIAL HYPERTENSION: ICD-10-CM

## 2019-09-20 RX ORDER — HYDROCHLOROTHIAZIDE 25 MG/1
25 TABLET ORAL DAILY
Qty: 90 TABLET | Refills: 3 | Status: SHIPPED | OUTPATIENT
Start: 2019-09-20 | End: 2020-01-17 | Stop reason: ALTCHOICE

## 2019-10-11 DIAGNOSIS — E11.9 TYPE 2 DIABETES MELLITUS WITHOUT COMPLICATION, WITHOUT LONG-TERM CURRENT USE OF INSULIN (HCC): ICD-10-CM

## 2020-01-03 ENCOUNTER — APPOINTMENT (OUTPATIENT)
Dept: LAB | Facility: CLINIC | Age: 80
End: 2020-01-03
Payer: COMMERCIAL

## 2020-01-03 DIAGNOSIS — I25.10 ARTERIOSCLEROTIC CORONARY ARTERY DISEASE: ICD-10-CM

## 2020-01-03 DIAGNOSIS — E11.8 TYPE 2 DIABETES MELLITUS WITH COMPLICATION (HCC): ICD-10-CM

## 2020-01-03 DIAGNOSIS — Z11.59 NEED FOR HEPATITIS C SCREENING TEST: ICD-10-CM

## 2020-01-03 DIAGNOSIS — I10 ESSENTIAL HYPERTENSION: ICD-10-CM

## 2020-01-03 DIAGNOSIS — E66.01 MORBID OBESITY, UNSPECIFIED OBESITY TYPE (HCC): ICD-10-CM

## 2020-01-03 LAB
ALBUMIN SERPL BCP-MCNC: 3.4 G/DL (ref 3.5–5)
ALP SERPL-CCNC: 68 U/L (ref 46–116)
ALT SERPL W P-5'-P-CCNC: 51 U/L (ref 12–78)
ANION GAP SERPL CALCULATED.3IONS-SCNC: 6 MMOL/L (ref 4–13)
AST SERPL W P-5'-P-CCNC: 46 U/L (ref 5–45)
BASOPHILS # BLD AUTO: 0.09 THOUSANDS/ΜL (ref 0–0.1)
BASOPHILS NFR BLD AUTO: 1 % (ref 0–1)
BILIRUB SERPL-MCNC: 0.49 MG/DL (ref 0.2–1)
BUN SERPL-MCNC: 14 MG/DL (ref 5–25)
CALCIUM SERPL-MCNC: 9.5 MG/DL (ref 8.3–10.1)
CHLORIDE SERPL-SCNC: 100 MMOL/L (ref 100–108)
CHOLEST SERPL-MCNC: 264 MG/DL (ref 50–200)
CO2 SERPL-SCNC: 27 MMOL/L (ref 21–32)
CREAT SERPL-MCNC: 0.94 MG/DL (ref 0.6–1.3)
CREAT UR-MCNC: 282 MG/DL
EOSINOPHIL # BLD AUTO: 0.32 THOUSAND/ΜL (ref 0–0.61)
EOSINOPHIL NFR BLD AUTO: 4 % (ref 0–6)
ERYTHROCYTE [DISTWIDTH] IN BLOOD BY AUTOMATED COUNT: 12.5 % (ref 11.6–15.1)
EST. AVERAGE GLUCOSE BLD GHB EST-MCNC: 349 MG/DL
GFR SERPL CREATININE-BSD FRML MDRD: 59 ML/MIN/1.73SQ M
GLUCOSE P FAST SERPL-MCNC: 295 MG/DL (ref 65–99)
HBA1C MFR BLD: 13.8 % (ref 4.2–6.3)
HCT VFR BLD AUTO: 41 % (ref 34.8–46.1)
HCV AB SER QL: NORMAL
HDLC SERPL-MCNC: 41 MG/DL
HGB BLD-MCNC: 13 G/DL (ref 11.5–15.4)
IMM GRANULOCYTES # BLD AUTO: 0.03 THOUSAND/UL (ref 0–0.2)
IMM GRANULOCYTES NFR BLD AUTO: 0 % (ref 0–2)
LDLC SERPL CALC-MCNC: 148 MG/DL (ref 0–100)
LYMPHOCYTES # BLD AUTO: 2.78 THOUSANDS/ΜL (ref 0.6–4.47)
LYMPHOCYTES NFR BLD AUTO: 33 % (ref 14–44)
MCH RBC QN AUTO: 30.4 PG (ref 26.8–34.3)
MCHC RBC AUTO-ENTMCNC: 31.7 G/DL (ref 31.4–37.4)
MCV RBC AUTO: 96 FL (ref 82–98)
MICROALBUMIN UR-MCNC: 70.5 MG/L (ref 0–20)
MICROALBUMIN/CREAT 24H UR: 25 MG/G CREATININE (ref 0–30)
MONOCYTES # BLD AUTO: 0.78 THOUSAND/ΜL (ref 0.17–1.22)
MONOCYTES NFR BLD AUTO: 9 % (ref 4–12)
NEUTROPHILS # BLD AUTO: 4.5 THOUSANDS/ΜL (ref 1.85–7.62)
NEUTS SEG NFR BLD AUTO: 53 % (ref 43–75)
NONHDLC SERPL-MCNC: 223 MG/DL
NRBC BLD AUTO-RTO: 0 /100 WBCS
PLATELET # BLD AUTO: 197 THOUSANDS/UL (ref 149–390)
PMV BLD AUTO: 12.1 FL (ref 8.9–12.7)
POTASSIUM SERPL-SCNC: 4.2 MMOL/L (ref 3.5–5.3)
PROT SERPL-MCNC: 7.2 G/DL (ref 6.4–8.2)
RBC # BLD AUTO: 4.27 MILLION/UL (ref 3.81–5.12)
SODIUM SERPL-SCNC: 133 MMOL/L (ref 136–145)
TRIGL SERPL-MCNC: 377 MG/DL
TSH SERPL DL<=0.05 MIU/L-ACNC: 3.18 UIU/ML (ref 0.36–3.74)
WBC # BLD AUTO: 8.5 THOUSAND/UL (ref 4.31–10.16)

## 2020-01-03 PROCEDURE — 83036 HEMOGLOBIN GLYCOSYLATED A1C: CPT

## 2020-01-03 PROCEDURE — 82570 ASSAY OF URINE CREATININE: CPT

## 2020-01-03 PROCEDURE — 80053 COMPREHEN METABOLIC PANEL: CPT

## 2020-01-03 PROCEDURE — 85025 COMPLETE CBC W/AUTO DIFF WBC: CPT

## 2020-01-03 PROCEDURE — 86803 HEPATITIS C AB TEST: CPT

## 2020-01-03 PROCEDURE — 84443 ASSAY THYROID STIM HORMONE: CPT

## 2020-01-03 PROCEDURE — 82043 UR ALBUMIN QUANTITATIVE: CPT

## 2020-01-03 PROCEDURE — 36415 COLL VENOUS BLD VENIPUNCTURE: CPT

## 2020-01-03 PROCEDURE — 80061 LIPID PANEL: CPT

## 2020-01-17 ENCOUNTER — OFFICE VISIT (OUTPATIENT)
Dept: FAMILY MEDICINE CLINIC | Facility: CLINIC | Age: 80
End: 2020-01-17
Payer: COMMERCIAL

## 2020-01-17 VITALS
TEMPERATURE: 98.5 F | BODY MASS INDEX: 43.51 KG/M2 | HEART RATE: 76 BPM | HEIGHT: 59 IN | SYSTOLIC BLOOD PRESSURE: 120 MMHG | DIASTOLIC BLOOD PRESSURE: 68 MMHG | WEIGHT: 215.8 LBS

## 2020-01-17 DIAGNOSIS — E11.9 TYPE 2 DIABETES MELLITUS WITHOUT COMPLICATION, UNSPECIFIED WHETHER LONG TERM INSULIN USE (HCC): Primary | ICD-10-CM

## 2020-01-17 DIAGNOSIS — I10 ESSENTIAL HYPERTENSION: ICD-10-CM

## 2020-01-17 DIAGNOSIS — I25.10 ARTERIOSCLEROTIC CORONARY ARTERY DISEASE: ICD-10-CM

## 2020-01-17 DIAGNOSIS — E66.01 MORBID OBESITY, UNSPECIFIED OBESITY TYPE (HCC): ICD-10-CM

## 2020-01-17 DIAGNOSIS — E78.00 PURE HYPERCHOLESTEROLEMIA: ICD-10-CM

## 2020-01-17 PROBLEM — G89.29 CHRONIC RIGHT-SIDED LOW BACK PAIN WITH RIGHT-SIDED SCIATICA: Status: ACTIVE | Noted: 2020-01-17

## 2020-01-17 PROBLEM — M54.41 CHRONIC RIGHT-SIDED LOW BACK PAIN WITH RIGHT-SIDED SCIATICA: Status: ACTIVE | Noted: 2020-01-17

## 2020-01-17 PROCEDURE — 1036F TOBACCO NON-USER: CPT | Performed by: FAMILY MEDICINE

## 2020-01-17 PROCEDURE — 1160F RVW MEDS BY RX/DR IN RCRD: CPT | Performed by: FAMILY MEDICINE

## 2020-01-17 PROCEDURE — 3078F DIAST BP <80 MM HG: CPT | Performed by: FAMILY MEDICINE

## 2020-01-17 PROCEDURE — 3074F SYST BP LT 130 MM HG: CPT | Performed by: FAMILY MEDICINE

## 2020-01-17 PROCEDURE — 99214 OFFICE O/P EST MOD 30 MIN: CPT | Performed by: FAMILY MEDICINE

## 2020-01-17 RX ORDER — REPAGLINIDE 1 MG/1
1 TABLET ORAL
Qty: 90 TABLET | Refills: 5 | Status: SHIPPED | OUTPATIENT
Start: 2020-01-17 | End: 2020-03-18

## 2020-01-17 NOTE — PROGRESS NOTES
Assessment/Plan:    Type 2 diabetes mellitus ([facility] )    Lab Results   Component Value Date    HGBA1C 13 8 (H) 01/03/2020     Poor control due to noncompliance with diet  Exercise is limited by orthopedic issues  Recommended that patient start insulin       Diagnoses and all orders for this visit:    Type 2 diabetes mellitus without complication, unspecified whether long term insulin use (HCC)  -     Empagliflozin (JARDIANCE) 10 MG TABS; Take 1 tablet (10 mg total) by mouth every morning  -     repaglinide (PRANDIN) 1 mg tablet; Take 1 tablet (1 mg total) by mouth 3 (three) times a day before meals  -     Basic metabolic panel; Future  -     Hemoglobin A1C; Future    Arteriosclerotic coronary artery disease    Pure hypercholesterolemia          Subjective:      Patient ID: Hilda Quispe is a 70 y o  female  f/u multiple med issues  - patient notes slowly worsening low back pain radiating to her right leg over last 2 months or so  Worse with certain positions in activities  Patient has not noticed any weakness in her leg  Pain tends to be sharp pain can be severe at times, and originates as did the right SI joint  - pt has fallen off her diet since last visit  A1c done 2 weeks ago increased from 8 3-13 8  Triglycerides are also elevated at 377 and LDL increased to 148  Patient denies increased thirst or urination  Up to date with eye ecams  - pt is not exercising regularly  Pt denies CP, palpitations, lightheadedness or other CV symptoms with or without exertion  Due to see Cardio in March  - no GI complaints  Never had colonoscopy and refuses screen     - pt still refuses mammogram  "I don't believe in them"        The following portions of the patient's history were reviewed and updated as appropriate:   She  has a past medical history of Bereavement, uncomplicated, Coronary artery disease, Diabetes mellitus ([facility] ), Hyperlipidemia, Hypertension, Lymphedema, Morbid obesity ([facility] ), and Myocardial infarction [facility])  She   Patient Active Problem List    Diagnosis Date Noted    Chronic right-sided low back pain with right-sided sciatica 01/17/2020    Morbid obesity, unspecified obesity type ([facility] ) 10/05/2016    Lymphedema of both lower extremities 11/06/2015    Type 2 diabetes mellitus ([facility] ) 10/26/2015    Hyperlipidemia 12/06/2013    Allergic rhinitis 09/27/2013    Arteriosclerotic coronary artery disease 07/31/2012    Hypertension 07/31/2012    Esophageal reflux 07/31/2012     She  has a past surgical history that includes Cardiac catheterization; Hip surgery; and Tubal ligation  She  reports that she has never smoked  She has never used smokeless tobacco  She reports that she does not drink alcohol or use drugs  Current Outpatient Medications   Medication Sig Dispense Refill    amLODIPine (NORVASC) 5 mg tablet Take 1 tablet (5 mg total) by mouth daily 90 tablet 3    Ascorbic Acid (VITAMIN C) 500 MG CAPS Take 1 tablet by mouth daily      aspirin 81 MG tablet Take 1 tablet by mouth as needed Take 1 tablet weekly       Empagliflozin (JARDIANCE) 10 MG TABS Take 1 tablet (10 mg total) by mouth every morning 30 tablet 1    lisinopril (ZESTRIL) 40 mg tablet Take 1 tablet (40 mg total) by mouth daily 90 tablet 3    metFORMIN (GLUCOPHAGE) 500 mg tablet Take 1 tablet (500 mg total) by mouth 2 (two) times a day 60 tablet 3    metoprolol succinate (TOPROL-XL) 100 mg 24 hr tablet Take 1 tablet (100 mg total) by mouth daily 90 tablet 3    multivitamin (THERAGRAN) TABS Take 1 tablet by mouth daily      repaglinide (PRANDIN) 1 mg tablet Take 1 tablet (1 mg total) by mouth 3 (three) times a day before meals 90 tablet 5    terconazole (TERAZOL 7) 0 4 % vaginal cream Insert 1 applicator into the vagina daily at bedtime 45 g 1     No current facility-administered medications for this visit  She is allergic to sulfa antibiotics       Review of Systems   Constitutional: Negative  HENT: Negative      Eyes: Negative  Respiratory: Negative  Cardiovascular: Negative  Gastrointestinal: Negative  Endocrine: Negative  Genitourinary: Negative  Musculoskeletal: Positive for arthralgias, back pain and gait problem  Negative for joint swelling  Skin: Negative  Allergic/Immunologic: Negative  Neurological: Negative for dizziness, weakness, light-headedness, numbness and headaches  Hematological: Negative  Psychiatric/Behavioral: Negative  Objective:      /68   Pulse 76   Temp 98 5 °F (36 9 °C)   Ht 4' 11" (1 499 m)   Wt 97 9 kg (215 lb 12 8 oz)   BMI 43 59 kg/m²          Physical Exam   Constitutional: She is oriented to person, place, and time  She appears well-developed and well-nourished  HENT:   Head: Normocephalic and atraumatic  Right Ear: External ear normal    Left Ear: External ear normal    Nose: Nose normal    Mouth/Throat: Oropharynx is clear and moist  No oropharyngeal exudate  Eyes: Pupils are equal, round, and reactive to light  Conjunctivae and EOM are normal    Neck: Normal range of motion  Neck supple  Cardiovascular: Normal rate, regular rhythm and intact distal pulses  Pulses are no weak pulses  No murmur heard  Pulses:       Dorsalis pedis pulses are 1+ on the right side, and 1+ on the left side  Pulmonary/Chest: Effort normal and breath sounds normal    Abdominal: Soft  She exhibits no distension and no mass  There is no tenderness  Musculoskeletal: Normal range of motion  She exhibits edema (trace)  Feet:   Right Foot:   Skin Integrity: Negative for ulcer, skin breakdown, erythema, warmth, callus or dry skin  Left Foot:   Skin Integrity: Negative for ulcer, skin breakdown, erythema, warmth, callus or dry skin  Lymphadenopathy:     She has no cervical adenopathy  Neurological: She is alert and oriented to person, place, and time  She has normal reflexes  She displays normal reflexes  No cranial nerve deficit   She exhibits normal muscle tone  Coordination normal    Skin: Skin is warm and dry  She is not diaphoretic  Psychiatric: She has a normal mood and affect  PHQ-2 = 0         Patient's shoes and socks removed  Right Foot/Ankle   Right Foot Inspection  Skin Exam: skin normal and skin intact no dry skin, no warmth, no callus, no erythema, no maceration, no abnormal color, no pre-ulcer, no ulcer and no callus                          Toe Exam: ROM and strength within normal limits  Sensory   Vibration: intact    Monofilament testing: intact  Vascular  Capillary refills: < 3 seconds  The right DP pulse is 1+  Left Foot/Ankle  Left Foot Inspection  Skin Exam: skin normal and skin intactno dry skin, no warmth, no erythema, no maceration, normal color, no pre-ulcer, no ulcer and no callus                         Toe Exam: ROM and strength within normal limits                   Sensory   Vibration: intact    Monofilament: intact  Vascular  Capillary refills: < 3 seconds  The left DP pulse is 1+  Assign Risk Category:  No deformity present; No loss of protective sensation;  No weak pulses       Risk: 0

## 2020-01-20 NOTE — ASSESSMENT & PLAN NOTE
BMI Counseling: Body mass index is 43 59 kg/m²  The BMI is above normal  Nutrition recommendations include reducing portion sizes, consuming healthier snacks, moderation in carbohydrate intake, increasing intake of lean protein, reducing intake of saturated fat and trans fat and reducing intake of cholesterol

## 2020-01-20 NOTE — ASSESSMENT & PLAN NOTE
Patient denies chest pain or other anginal symptoms  Due to follow up with cardiology in 2 months  Continue present medications    Recheck 3 months

## 2020-01-20 NOTE — ASSESSMENT & PLAN NOTE
Triglycerides increase most likely due to poor sugar control  Treat diabetes as above  Recheck labs in 3 months

## 2020-01-20 NOTE — ASSESSMENT & PLAN NOTE
Lab Results   Component Value Date    HGBA1C 13 8 (H) 01/03/2020     Poor control due to noncompliance with diet  Exercise is limited by orthopedic issues  Recommended that patient start insulin but she refuses at this point  Patient understands risks  She is willing to take other oral medications  Continue metformin  Start rib back when I had 1 mg with each meal   Samples of Jardiance 10 mg given to be taken 1 tablet 30 minutes before breakfast   I reviewed side effects and warnings regarding all medications  Urged dietary compliance    Recheck 3 months

## 2020-03-16 ENCOUNTER — APPOINTMENT (OUTPATIENT)
Dept: LAB | Facility: CLINIC | Age: 80
End: 2020-03-16
Payer: COMMERCIAL

## 2020-03-16 DIAGNOSIS — E11.9 TYPE 2 DIABETES MELLITUS WITHOUT COMPLICATION, UNSPECIFIED WHETHER LONG TERM INSULIN USE (HCC): ICD-10-CM

## 2020-03-16 LAB
ANION GAP SERPL CALCULATED.3IONS-SCNC: 6 MMOL/L (ref 4–13)
BUN SERPL-MCNC: 16 MG/DL (ref 5–25)
CALCIUM SERPL-MCNC: 9.3 MG/DL (ref 8.3–10.1)
CHLORIDE SERPL-SCNC: 105 MMOL/L (ref 100–108)
CO2 SERPL-SCNC: 26 MMOL/L (ref 21–32)
CREAT SERPL-MCNC: 0.8 MG/DL (ref 0.6–1.3)
EST. AVERAGE GLUCOSE BLD GHB EST-MCNC: 260 MG/DL
GFR SERPL CREATININE-BSD FRML MDRD: 71 ML/MIN/1.73SQ M
GLUCOSE P FAST SERPL-MCNC: 182 MG/DL (ref 65–99)
HBA1C MFR BLD: 10.7 %
POTASSIUM SERPL-SCNC: 4.2 MMOL/L (ref 3.5–5.3)
SODIUM SERPL-SCNC: 137 MMOL/L (ref 136–145)

## 2020-03-16 PROCEDURE — 80048 BASIC METABOLIC PNL TOTAL CA: CPT

## 2020-03-16 PROCEDURE — 83036 HEMOGLOBIN GLYCOSYLATED A1C: CPT

## 2020-03-16 PROCEDURE — 36415 COLL VENOUS BLD VENIPUNCTURE: CPT

## 2020-03-18 ENCOUNTER — OFFICE VISIT (OUTPATIENT)
Dept: CARDIOLOGY CLINIC | Facility: CLINIC | Age: 80
End: 2020-03-18
Payer: COMMERCIAL

## 2020-03-18 VITALS
HEIGHT: 59 IN | SYSTOLIC BLOOD PRESSURE: 128 MMHG | DIASTOLIC BLOOD PRESSURE: 60 MMHG | WEIGHT: 215 LBS | HEART RATE: 88 BPM | BODY MASS INDEX: 43.34 KG/M2

## 2020-03-18 DIAGNOSIS — I25.10 CORONARY ARTERY DISEASE INVOLVING NATIVE CORONARY ARTERY OF NATIVE HEART WITHOUT ANGINA PECTORIS: Primary | ICD-10-CM

## 2020-03-18 DIAGNOSIS — I10 ESSENTIAL HYPERTENSION: ICD-10-CM

## 2020-03-18 DIAGNOSIS — E78.00 PURE HYPERCHOLESTEROLEMIA: ICD-10-CM

## 2020-03-18 PROCEDURE — 3060F POS MICROALBUMINURIA REV: CPT | Performed by: INTERNAL MEDICINE

## 2020-03-18 PROCEDURE — 99214 OFFICE O/P EST MOD 30 MIN: CPT | Performed by: INTERNAL MEDICINE

## 2020-03-18 PROCEDURE — 93000 ELECTROCARDIOGRAM COMPLETE: CPT | Performed by: INTERNAL MEDICINE

## 2020-03-18 PROCEDURE — 3046F HEMOGLOBIN A1C LEVEL >9.0%: CPT | Performed by: INTERNAL MEDICINE

## 2020-03-18 PROCEDURE — 1036F TOBACCO NON-USER: CPT | Performed by: INTERNAL MEDICINE

## 2020-03-18 PROCEDURE — 3074F SYST BP LT 130 MM HG: CPT | Performed by: INTERNAL MEDICINE

## 2020-03-18 PROCEDURE — 3008F BODY MASS INDEX DOCD: CPT | Performed by: INTERNAL MEDICINE

## 2020-03-18 PROCEDURE — 3078F DIAST BP <80 MM HG: CPT | Performed by: INTERNAL MEDICINE

## 2020-03-18 PROCEDURE — 1160F RVW MEDS BY RX/DR IN RCRD: CPT | Performed by: INTERNAL MEDICINE

## 2020-03-18 RX ORDER — HYDROCHLOROTHIAZIDE 25 MG/1
25 TABLET ORAL DAILY
COMMUNITY
End: 2020-10-16 | Stop reason: SDUPTHER

## 2020-03-18 NOTE — PROGRESS NOTES
Cardiology Follow Up    [facility] KANE Carpenter  1940  [id number]  [address] [address] [phone]    1  Coronary artery disease involving native coronary artery of native heart without angina pectoris  POCT ECG   2  Essential hypertension     3  Pure hypercholesterolemia           Discussion/Summary: All of her assessed cardiac problems are stable  She has not been taking her ASA regularly  I convinced her to take ASA 81 mg QOD  The rest of her medications remain the same  No cardiac testing is ordered  RTO 9-12 months  Interval History: She has not had any cardiac problems since her last office visit  Her weight is down from 226 to 215 lbs  BP is controlled  She refuses to take statins  She denies CP, SOB  She says that she is active  She has CAD with L Cx stenting in 2010  Last Hgb A1C was 10 7        Patient Active Problem List   Diagnosis    Hypertension    Hyperlipidemia    Morbid obesity, unspecified obesity type ([facility] )    Allergic rhinitis    Esophageal reflux    Type 2 diabetes mellitus ([address] )    Chronic right-sided low back pain with right-sided sciatica    Coronary artery disease involving native coronary artery of native heart without angina pectoris     Past Medical History:   Diagnosis Date    Bereavement, uncomplicated     [id number]  RESOLVED    Coronary artery disease     Diabetes mellitus ([address] )     Hyperlipidemia     Hypertension     Lymphedema     Morbid obesity ([address] )     Myocardial infarction ([facility] )      Social History     Socioeconomic History    Marital status:      Spouse name: Not on file    Number of children: Not on file    Years of education: Not on file    Highest education level: Not on file   Occupational History    Occupation: RETIRED   Social Needs    Financial resource strain: Not on file    Food insecurity:     Worry: Not on file     Inability: Not on file    Transportation needs:     Medical: Not on file     Non-medical: Not on file   Tobacco Use    Smoking status: Never Smoker    Smokeless tobacco: Never Used   Substance and Sexual Activity    Alcohol use: No    Drug use: Never    Sexual activity: Not Currently   Lifestyle    Physical activity:     Days per week: Not on file     Minutes per session: Not on file    Stress: Not on file   Relationships    Social connections:     Talks on phone: Not on file     Gets together: Not on file     Attends [employer] service: Not on file     Active member of club or organization: Not on file     Attends meetings of clubs or organizations: Not on file     Relationship status: Not on file    Intimate partner violence:     Fear of current or ex partner: Not on file     Emotionally abused: Not on file     Physically abused: Not on file     Forced sexual activity: Not on file   Other Topics Concern    Not on file   Social History Narrative    DAILY [employer]      Family History   Problem Relation Age of Onset    Heart disease Mother     Diabetes type II Mother     Hypertension Mother     Heart disease Sister     Diabetes type II Sister     Diabetes type II Brother     Diabetes type II Family      Past Surgical History:   Procedure Laterality Date    CARDIAC CATHETERIZATION      HIP SURGERY      TUBAL LIGATION         Current Outpatient Medications:     amLODIPine (NORVASC) 5 mg tablet, Take 1 tablet (5 mg total) by mouth daily, Disp: 90 tablet, Rfl: 3    Ascorbic Acid (VITAMIN C) 500 MG CAPS, Take 1 tablet by mouth daily, Disp: , Rfl:     aspirin 81 MG tablet, Take 1 tablet by mouth as needed Take 1 tablet weekly , Disp: , Rfl:     hydrochlorothiazide (HYDRODIURIL) 25 mg tablet, Take 25 mg by mouth daily, Disp: , Rfl:     lisinopril (ZESTRIL) 40 mg tablet, Take 1 tablet (40 mg total) by mouth daily, Disp: 90 tablet, Rfl: 3    metFORMIN (GLUCOPHAGE) 500 mg tablet, Take 1 tablet (500 mg total) by mouth 2 (two) times a day, Disp: 60 tablet, Rfl: 3    metoprolol succinate (TOPROL-XL) 100 mg 24 hr tablet, Take 1 tablet (100 mg total) by mouth daily, Disp: 90 tablet, Rfl: 3    terconazole (TERAZOL 7) 0 4 % vaginal cream, Insert 1 applicator into the vagina daily at bedtime (Patient not taking: Reported on 3/18/2020), Disp: 45 g, Rfl: 1  Allergies   Allergen Reactions    Sulfa Antibiotics Other (See Comments)     Vitals:    03/18/20 1055   BP: 128/60   BP Location: Left arm   Cuff Size: Large   Pulse: 88   Weight: 97 5 kg (215 lb)   Height: 4' 11" (1 499 m)     Weight (last 2 days)     Date/Time   Weight    03/18/20 1055   97 5 (215)             Blood pressure 128/60, pulse 88, height 4' 11" (1 499 m), weight 97 5 kg (215 lb), not currently breastfeeding , Body mass index is 43 42 kg/m²      Labs:  Appointment on 03/16/2020   Component Date Value    Sodium 03/16/2020 137     Potassium 03/16/2020 4 2     Chloride 03/16/2020 105     CO2 03/16/2020 26     ANION GAP 03/16/2020 6     BUN 03/16/2020 16     Creatinine 03/16/2020 0 80     Glucose, Fasting 03/16/2020 182*    Calcium 03/16/2020 9 3     eGFR 03/16/2020 71     Hemoglobin A1C 03/16/2020 10 7*    EAG 03/16/2020 260    Appointment on 01/03/2020   Component Date Value    Hepatitis C Ab 01/03/2020 Non-reactive     Sodium 01/03/2020 133*    Potassium 01/03/2020 4 2     Chloride 01/03/2020 100     CO2 01/03/2020 27     ANION GAP 01/03/2020 6     BUN 01/03/2020 14     Creatinine 01/03/2020 0 94     Glucose, Fasting 01/03/2020 295*    Calcium 01/03/2020 9 5     AST 01/03/2020 46*    ALT 01/03/2020 51     Alkaline Phosphatase 01/03/2020 68     Total Protein 01/03/2020 7 2     Albumin 01/03/2020 3 4*    Total Bilirubin 01/03/2020 0 49     eGFR 01/03/2020 59     Hemoglobin A1C 01/03/2020 13 8*    EAG 01/03/2020 349     Cholesterol 01/03/2020 264*    Triglycerides 01/03/2020 377*    HDL, Direct 01/03/2020 41     LDL Calculated 01/03/2020 148*    Non-HDL-Chol (CHOL-HDL) 01/03/2020 223     Creatinine, Ur 01/03/2020 282 0     Microalbum  ,U,Random 01/03/2020 70 5*    Microalb Creat Ratio 01/03/2020 25     WBC 01/03/2020 8 50     RBC 01/03/2020 4 27     Hemoglobin 01/03/2020 13 0     Hematocrit 01/03/2020 41 0     MCV 01/03/2020 96     MCH 01/03/2020 30 4     MCHC 01/03/2020 31 7     RDW 01/03/2020 12 5     MPV 01/03/2020 12 1     Platelets 03/00/7611 197     nRBC 01/03/2020 0     Neutrophils Relative 01/03/2020 53     Immat GRANS % 01/03/2020 0     Lymphocytes Relative 01/03/2020 33     Monocytes Relative 01/03/2020 9     Eosinophils Relative 01/03/2020 4     Basophils Relative 01/03/2020 1     Neutrophils Absolute 01/03/2020 4 50     Immature Grans Absolute 01/03/2020 0 03     Lymphocytes Absolute 01/03/2020 2 78     Monocytes Absolute 01/03/2020 0 78     Eosinophils Absolute 01/03/2020 0 32     Basophils Absolute 01/03/2020 0 09     TSH 3RD GENERATON 01/03/2020 3 180      Imaging: No results found  Review of Systems:  Review of Systems   Constitutional: Negative for diaphoresis, fatigue, fever and unexpected weight change  HENT: Negative  Respiratory: Negative for cough, shortness of breath and wheezing  Cardiovascular: Negative for chest pain, palpitations and leg swelling  Gastrointestinal: Negative for abdominal pain, diarrhea and nausea  Musculoskeletal: Negative for gait problem and myalgias  Skin: Negative for rash  Neurological: Negative for dizziness and numbness  Psychiatric/Behavioral: Negative  Physical Exam:  Physical Exam   Constitutional: She is oriented to person, place, and time  She appears well-developed and well-nourished  HENT:   Head: Normocephalic and atraumatic  Eyes: Pupils are equal, round, and reactive to light  Neck: Normal range of motion  Neck supple  No JVD present     Cardiovascular: Regular rhythm, S1 normal, S2 normal and normal pulses  Pulses:       Carotid pulses are 2+ on the right side, and 2+ on the left side  Pulmonary/Chest: Effort normal and breath sounds normal  She has no wheezes  She has no rales  Abdominal: Soft  Bowel sounds are normal  There is no tenderness  Musculoskeletal: Normal range of motion  She exhibits no edema or tenderness  Neurological: She is alert and oriented to person, place, and time  She has normal reflexes  No cranial nerve deficit  Skin: Skin is warm  Psychiatric: She has a normal mood and affect

## 2020-04-21 DIAGNOSIS — I10 HYPERTENSION, UNSPECIFIED TYPE: ICD-10-CM

## 2020-04-21 RX ORDER — LISINOPRIL 40 MG/1
40 TABLET ORAL DAILY
Qty: 90 TABLET | Refills: 3 | Status: SHIPPED | OUTPATIENT
Start: 2020-04-21 | End: 2021-04-23 | Stop reason: SDUPTHER

## 2020-04-21 RX ORDER — AMLODIPINE BESYLATE 5 MG/1
5 TABLET ORAL DAILY
Qty: 90 TABLET | Refills: 3 | Status: SHIPPED | OUTPATIENT
Start: 2020-04-21 | End: 2021-04-22 | Stop reason: SDUPTHER

## 2020-05-18 ENCOUNTER — OFFICE VISIT (OUTPATIENT)
Dept: FAMILY MEDICINE CLINIC | Facility: CLINIC | Age: 80
End: 2020-05-18
Payer: COMMERCIAL

## 2020-05-18 VITALS
TEMPERATURE: 98.5 F | WEIGHT: 217 LBS | SYSTOLIC BLOOD PRESSURE: 128 MMHG | HEIGHT: 59 IN | BODY MASS INDEX: 43.75 KG/M2 | DIASTOLIC BLOOD PRESSURE: 62 MMHG | HEART RATE: 78 BPM

## 2020-05-18 DIAGNOSIS — I25.10 CORONARY ARTERY DISEASE INVOLVING NATIVE CORONARY ARTERY OF NATIVE HEART WITHOUT ANGINA PECTORIS: ICD-10-CM

## 2020-05-18 DIAGNOSIS — I10 ESSENTIAL HYPERTENSION: ICD-10-CM

## 2020-05-18 DIAGNOSIS — E11.9 TYPE 2 DIABETES MELLITUS WITHOUT COMPLICATION, UNSPECIFIED WHETHER LONG TERM INSULIN USE (HCC): Primary | ICD-10-CM

## 2020-05-18 DIAGNOSIS — E78.00 PURE HYPERCHOLESTEROLEMIA: ICD-10-CM

## 2020-05-18 DIAGNOSIS — I48.91 ATRIAL FIBRILLATION, UNSPECIFIED TYPE (HCC): ICD-10-CM

## 2020-05-18 PROCEDURE — 1160F RVW MEDS BY RX/DR IN RCRD: CPT | Performed by: FAMILY MEDICINE

## 2020-05-18 PROCEDURE — 3078F DIAST BP <80 MM HG: CPT | Performed by: FAMILY MEDICINE

## 2020-05-18 PROCEDURE — 99214 OFFICE O/P EST MOD 30 MIN: CPT | Performed by: FAMILY MEDICINE

## 2020-05-18 PROCEDURE — 3074F SYST BP LT 130 MM HG: CPT | Performed by: FAMILY MEDICINE

## 2020-05-18 PROCEDURE — 3046F HEMOGLOBIN A1C LEVEL >9.0%: CPT | Performed by: FAMILY MEDICINE

## 2020-05-18 PROCEDURE — 1036F TOBACCO NON-USER: CPT | Performed by: FAMILY MEDICINE

## 2020-05-18 PROCEDURE — 3060F POS MICROALBUMINURIA REV: CPT | Performed by: FAMILY MEDICINE

## 2020-05-18 PROCEDURE — 3008F BODY MASS INDEX DOCD: CPT | Performed by: FAMILY MEDICINE

## 2020-06-04 DIAGNOSIS — I48.91 ATRIAL FIBRILLATION, UNSPECIFIED TYPE (HCC): ICD-10-CM

## 2020-06-04 RX ORDER — METOPROLOL SUCCINATE 100 MG/1
100 TABLET, EXTENDED RELEASE ORAL DAILY
Qty: 90 TABLET | Refills: 4 | Status: SHIPPED | OUTPATIENT
Start: 2020-06-04 | End: 2021-08-31 | Stop reason: SDUPTHER

## 2020-06-12 ENCOUNTER — APPOINTMENT (OUTPATIENT)
Dept: LAB | Facility: CLINIC | Age: 80
End: 2020-06-12
Payer: COMMERCIAL

## 2020-06-12 DIAGNOSIS — I10 ESSENTIAL HYPERTENSION: ICD-10-CM

## 2020-06-12 DIAGNOSIS — I25.10 CORONARY ARTERY DISEASE INVOLVING NATIVE CORONARY ARTERY OF NATIVE HEART WITHOUT ANGINA PECTORIS: ICD-10-CM

## 2020-06-12 DIAGNOSIS — E11.9 TYPE 2 DIABETES MELLITUS WITHOUT COMPLICATION, UNSPECIFIED WHETHER LONG TERM INSULIN USE (HCC): ICD-10-CM

## 2020-06-12 LAB
ALBUMIN SERPL BCP-MCNC: 3.7 G/DL (ref 3.5–5)
ALP SERPL-CCNC: 63 U/L (ref 46–116)
ALT SERPL W P-5'-P-CCNC: 39 U/L (ref 12–78)
ANION GAP SERPL CALCULATED.3IONS-SCNC: 9 MMOL/L (ref 4–13)
AST SERPL W P-5'-P-CCNC: 40 U/L (ref 5–45)
BASOPHILS # BLD AUTO: 0.05 THOUSANDS/ΜL (ref 0–0.1)
BASOPHILS NFR BLD AUTO: 1 % (ref 0–1)
BILIRUB SERPL-MCNC: 0.4 MG/DL (ref 0.2–1)
BUN SERPL-MCNC: 17 MG/DL (ref 5–25)
CALCIUM SERPL-MCNC: 9.5 MG/DL (ref 8.3–10.1)
CHLORIDE SERPL-SCNC: 101 MMOL/L (ref 100–108)
CHOLEST SERPL-MCNC: 253 MG/DL (ref 50–200)
CO2 SERPL-SCNC: 24 MMOL/L (ref 21–32)
CREAT SERPL-MCNC: 0.93 MG/DL (ref 0.6–1.3)
EOSINOPHIL # BLD AUTO: 0.29 THOUSAND/ΜL (ref 0–0.61)
EOSINOPHIL NFR BLD AUTO: 3 % (ref 0–6)
ERYTHROCYTE [DISTWIDTH] IN BLOOD BY AUTOMATED COUNT: 13.1 % (ref 11.6–15.1)
EST. AVERAGE GLUCOSE BLD GHB EST-MCNC: 258 MG/DL
GFR SERPL CREATININE-BSD FRML MDRD: 59 ML/MIN/1.73SQ M
GLUCOSE P FAST SERPL-MCNC: 169 MG/DL (ref 65–99)
HBA1C MFR BLD: 10.6 %
HCT VFR BLD AUTO: 41 % (ref 34.8–46.1)
HDLC SERPL-MCNC: 39 MG/DL
HGB BLD-MCNC: 13.2 G/DL (ref 11.5–15.4)
IMM GRANULOCYTES # BLD AUTO: 0.04 THOUSAND/UL (ref 0–0.2)
IMM GRANULOCYTES NFR BLD AUTO: 0 % (ref 0–2)
LYMPHOCYTES # BLD AUTO: 2.88 THOUSANDS/ΜL (ref 0.6–4.47)
LYMPHOCYTES NFR BLD AUTO: 29 % (ref 14–44)
MCH RBC QN AUTO: 30.1 PG (ref 26.8–34.3)
MCHC RBC AUTO-ENTMCNC: 32.2 G/DL (ref 31.4–37.4)
MCV RBC AUTO: 94 FL (ref 82–98)
MONOCYTES # BLD AUTO: 0.72 THOUSAND/ΜL (ref 0.17–1.22)
MONOCYTES NFR BLD AUTO: 7 % (ref 4–12)
NEUTROPHILS # BLD AUTO: 5.91 THOUSANDS/ΜL (ref 1.85–7.62)
NEUTS SEG NFR BLD AUTO: 60 % (ref 43–75)
NONHDLC SERPL-MCNC: 214 MG/DL
NRBC BLD AUTO-RTO: 0 /100 WBCS
PLATELET # BLD AUTO: 203 THOUSANDS/UL (ref 149–390)
PMV BLD AUTO: 13.1 FL (ref 8.9–12.7)
POTASSIUM SERPL-SCNC: 4.3 MMOL/L (ref 3.5–5.3)
PROT SERPL-MCNC: 7.6 G/DL (ref 6.4–8.2)
RBC # BLD AUTO: 4.38 MILLION/UL (ref 3.81–5.12)
SODIUM SERPL-SCNC: 134 MMOL/L (ref 136–145)
TRIGL SERPL-MCNC: 415 MG/DL
WBC # BLD AUTO: 9.89 THOUSAND/UL (ref 4.31–10.16)

## 2020-06-12 PROCEDURE — 36415 COLL VENOUS BLD VENIPUNCTURE: CPT

## 2020-06-12 PROCEDURE — 85025 COMPLETE CBC W/AUTO DIFF WBC: CPT

## 2020-06-12 PROCEDURE — 83036 HEMOGLOBIN GLYCOSYLATED A1C: CPT

## 2020-06-12 PROCEDURE — 80061 LIPID PANEL: CPT

## 2020-06-12 PROCEDURE — 80053 COMPREHEN METABOLIC PANEL: CPT

## 2020-06-17 ENCOUNTER — OFFICE VISIT (OUTPATIENT)
Dept: FAMILY MEDICINE CLINIC | Facility: CLINIC | Age: 80
End: 2020-06-17
Payer: COMMERCIAL

## 2020-06-17 VITALS
HEIGHT: 59 IN | BODY MASS INDEX: 43.75 KG/M2 | DIASTOLIC BLOOD PRESSURE: 72 MMHG | SYSTOLIC BLOOD PRESSURE: 124 MMHG | WEIGHT: 217 LBS | HEART RATE: 80 BPM | TEMPERATURE: 98.4 F

## 2020-06-17 DIAGNOSIS — E11.9 TYPE 2 DIABETES MELLITUS WITHOUT COMPLICATION, UNSPECIFIED WHETHER LONG TERM INSULIN USE (HCC): Primary | ICD-10-CM

## 2020-06-17 DIAGNOSIS — I25.10 CORONARY ARTERY DISEASE INVOLVING NATIVE CORONARY ARTERY OF NATIVE HEART WITHOUT ANGINA PECTORIS: ICD-10-CM

## 2020-06-17 DIAGNOSIS — E78.2 MIXED HYPERLIPIDEMIA: ICD-10-CM

## 2020-06-17 DIAGNOSIS — I48.91 ATRIAL FIBRILLATION, UNSPECIFIED TYPE (HCC): ICD-10-CM

## 2020-06-17 DIAGNOSIS — I10 ESSENTIAL HYPERTENSION: ICD-10-CM

## 2020-06-17 PROCEDURE — 3060F POS MICROALBUMINURIA REV: CPT | Performed by: FAMILY MEDICINE

## 2020-06-17 PROCEDURE — 3046F HEMOGLOBIN A1C LEVEL >9.0%: CPT | Performed by: FAMILY MEDICINE

## 2020-06-17 PROCEDURE — 1160F RVW MEDS BY RX/DR IN RCRD: CPT | Performed by: FAMILY MEDICINE

## 2020-06-17 PROCEDURE — 99214 OFFICE O/P EST MOD 30 MIN: CPT | Performed by: FAMILY MEDICINE

## 2020-06-17 PROCEDURE — 1036F TOBACCO NON-USER: CPT | Performed by: FAMILY MEDICINE

## 2020-06-17 PROCEDURE — 3078F DIAST BP <80 MM HG: CPT | Performed by: FAMILY MEDICINE

## 2020-06-17 PROCEDURE — 3074F SYST BP LT 130 MM HG: CPT | Performed by: FAMILY MEDICINE

## 2020-06-17 RX ORDER — GLIMEPIRIDE 2 MG/1
2 TABLET ORAL
Qty: 30 TABLET | Refills: 5 | Status: SHIPPED | OUTPATIENT
Start: 2020-06-17 | End: 2020-12-11 | Stop reason: SDUPTHER

## 2020-07-14 DIAGNOSIS — E11.9 TYPE 2 DIABETES MELLITUS WITHOUT COMPLICATION, WITHOUT LONG-TERM CURRENT USE OF INSULIN (HCC): ICD-10-CM

## 2020-08-13 ENCOUNTER — HOSPITAL ENCOUNTER (EMERGENCY)
Facility: HOSPITAL | Age: 80
Discharge: HOME | End: 2020-08-13
Attending: EMERGENCY MEDICINE | Admitting: EMERGENCY MEDICINE
Payer: COMMERCIAL

## 2020-08-13 ENCOUNTER — APPOINTMENT (EMERGENCY)
Dept: RADIOLOGY | Facility: HOSPITAL | Age: 80
End: 2020-08-13
Payer: COMMERCIAL

## 2020-08-13 VITALS
WEIGHT: 216 LBS | RESPIRATION RATE: 18 BRPM | TEMPERATURE: 98 F | BODY MASS INDEX: 43.55 KG/M2 | DIASTOLIC BLOOD PRESSURE: 81 MMHG | HEIGHT: 59 IN | OXYGEN SATURATION: 99 % | HEART RATE: 60 BPM | SYSTOLIC BLOOD PRESSURE: 134 MMHG

## 2020-08-13 DIAGNOSIS — M79.642 LEFT HAND PAIN: Primary | ICD-10-CM

## 2020-08-13 PROCEDURE — 99282 EMERGENCY DEPT VISIT SF MDM: CPT | Performed by: EMERGENCY MEDICINE

## 2020-08-13 PROCEDURE — 99283 EMERGENCY DEPT VISIT LOW MDM: CPT

## 2020-08-13 PROCEDURE — 73130 X-RAY EXAM OF HAND: CPT

## 2020-08-14 NOTE — ED NOTES
Arrived ambulatory with cane reporting burning pain in her left hand which has eccymotic area on hand from getting hit by metal door - removed wedding ring on left finger when arrived to room #1 in [address], RN  08/13/20 0285

## 2020-08-14 NOTE — ED PROVIDER NOTES
History  Chief Complaint   Patient presents with    Hand Injury     reports prior to coming in her left hand got hit by her metal storm door - wedding      This is a 61-year-old female who reports pain in her left hand following a storm door hitting it  She states this happens prior to arrival   There is moderate pain over the area bruising  She has tried some ice which has helped a little bit  With with palpation  Still has full range of motion  No numbness or weakness  States that she does not have osteoporosis in that she does not have fractures despite repeated falls recently  She did not hit any other part of her body  She has no other complaints  States she is not on blood thinners  Prior to Admission Medications   Prescriptions Last Dose Informant Patient Reported? Taking?    Ascorbic Acid (VITAMIN C) 500 MG CAPS 8/13/2020 at Unknown time Self Yes Yes   Sig: Take 1 tablet by mouth daily   amLODIPine (NORVASC) 5 mg tablet 8/13/2020 at Unknown time  No Yes   Sig: Take 1 tablet (5 mg total) by mouth daily   aspirin 81 MG tablet Past Week at Unknown time Self Yes Yes   Sig: Take 1 tablet by mouth as needed Take 1 tablet weekly    glimepiride (AMARYL) 2 mg tablet 8/13/2020 at Unknown time  No Yes   Sig: Take 1 tablet (2 mg total) by mouth daily with breakfast   hydrochlorothiazide (HYDRODIURIL) 25 mg tablet 8/13/2020 at Unknown time  Yes Yes   Sig: Take 25 mg by mouth daily   lisinopril (ZESTRIL) 40 mg tablet 8/13/2020 at Unknown time  No Yes   Sig: Take 1 tablet (40 mg total) by mouth daily   metFORMIN (GLUCOPHAGE) 500 mg tablet 8/13/2020 at Unknown time  No Yes   Sig: Take 1 tablet (500 mg total) by mouth 2 (two) times a day   metoprolol succinate (TOPROL-XL) 100 mg 24 hr tablet 8/13/2020 at Unknown time  No Yes   Sig: Take 1 tablet (100 mg total) by mouth daily   terconazole (TERAZOL 7) 0 4 % vaginal cream 8/13/2020 at Unknown time Self No Yes   Sig: Insert 1 applicator into the vagina daily at bedtime      Facility-Administered Medications: None       Past Medical History:   Diagnosis Date    Bereavement, uncomplicated     [id number]  RESOLVED    Coronary artery disease     [facility]     Hyperlipidemia     Hypertension     Lymphedema     Morbid obesity ([facility] )     Myocardial infarction [facility])        Past Surgical History:   Procedure Laterality Date    CARDIAC CATHETERIZATION      HIP SURGERY      TUBAL LIGATION         Family History   Problem Relation Age of Onset    Heart disease Mother     Diabetes type II Mother     Hypertension Mother     Heart disease Sister     Diabetes type II Sister     Diabetes type II Brother     Diabetes type II Family      I have reviewed and agree with the history as documented  E-Cigarette/Vaping     E-Cigarette/Vaping Substances     Social History     Tobacco Use    Smoking status: Never Smoker    Smokeless tobacco: Never Used   Substance Use Topics    Alcohol use: No    Drug use: Never       Review of Systems   Constitutional: Negative for activity change, chills, fatigue and fever  HENT: Negative for congestion  Eyes: Negative for visual disturbance  Respiratory: Negative for cough, chest tightness and shortness of breath  Cardiovascular: Negative for chest pain  Gastrointestinal: Negative for abdominal pain, diarrhea and vomiting  Genitourinary: Negative for dysuria  Skin: Negative for rash  Allergic/Immunologic: Negative for immunocompromised state  Neurological: Negative for dizziness, weakness and numbness  Physical Exam  Physical Exam  Constitutional:       Appearance: Normal appearance  She is well-developed  HENT:      Head: Normocephalic and atraumatic  Eyes:      Extraocular Movements: Extraocular movements intact  Conjunctiva/sclera: Conjunctivae normal       Pupils: Pupils are equal, round, and reactive to light  Neck:      Musculoskeletal: Normal range of motion and neck supple  Cardiovascular:      Rate and Rhythm: Normal rate and regular rhythm  Heart sounds: Normal heart sounds  Pulmonary:      Effort: Pulmonary effort is normal  No respiratory distress  Breath sounds: Normal breath sounds  Abdominal:      General: Bowel sounds are normal  There is no distension  Palpations: Abdomen is soft  Tenderness: There is no abdominal tenderness  There is no guarding  Musculoskeletal: Normal range of motion  Comments: Bruising approximately 5 cm in diameter over the dorsum of the left hand  Full active and passive range of motion  Pain to palpation over the area of bruising  No other areas of tenderness in the upper extremities  Skin:     General: Skin is warm and dry  Capillary Refill: Capillary refill takes less than 2 seconds  Neurological:      Mental Status: She is alert and oriented to person, place, and time  Psychiatric:         Behavior: Behavior normal          Vital Signs  ED Triage Vitals [08/13/20 2142]   Temperature Pulse Respirations Blood Pressure SpO2   98 °F (36 7 °C) 60 18 134/81 99 %      Temp Source Heart Rate Source Patient Position - Orthostatic VS BP Location FiO2 (%)   Temporal -- Sitting Right arm --      Pain Score       --           Vitals:    08/13/20 2142   BP: 134/81   Pulse: 60   Patient Position - Orthostatic VS: Sitting         Visual Acuity      ED Medications  Medications - No data to display    Diagnostic Studies  Results Reviewed     None                 XR hand 3+ views LEFT   ED Interpretation by Cadence Zamudio MD (08/13 2202)   Many bone spurs, naf                 Procedures  Procedures         ED Course                                             MDM  Number of Diagnoses or Management Options  Left hand pain: new and requires workup  Diagnosis management comments: This is a 26-year-old female with hand pain  Preliminary read by me of x-ray demonstrates no acute fractures    Discussed limitations of x-ray and need for over-read  Patient given a volar splint  Neurovascularly intact  Patient with clinically well  Pain was well controlled  Discussed warning signs and symptoms with the patient as well as when to return to the emergency department versus follow up with [facility]  Patient states understanding and agreement with the plan  Amount and/or Complexity of Data Reviewed  Tests in the radiology section of CPT®: ordered and reviewed  Independent visualization of images, tracings, or specimens: yes          Disposition  Final diagnoses:   Left hand pain     Time reflects when diagnosis was documented in both MDM as applicable and the Disposition within this note     Time User Action Codes Description Comment    8/13/2020 10:00 PM Sarah Hector Add [H11 635] Left hand pain       ED Disposition     ED Disposition Condition Date/Time Comment    Discharge Stable Thu Aug 13, 2020 10:00  [address] discharge to home/self care  Follow-up Information     Follow up With Specialties Details Why Krzysztof Mccann MD Family Medicine In 3 days  [address]  [phone]            Patient's Medications   Discharge Prescriptions    No medications on file     No discharge procedures on file      PDMP Review     None          ED Provider  Electronically Signed by           Ruben Canales MD  08/13/20 3936

## 2020-08-17 ENCOUNTER — OFFICE VISIT (OUTPATIENT)
Dept: FAMILY MEDICINE CLINIC | Facility: CLINIC | Age: 80
End: 2020-08-17
Payer: COMMERCIAL

## 2020-08-17 VITALS
WEIGHT: 221 LBS | TEMPERATURE: 98.1 F | HEIGHT: 59 IN | HEART RATE: 72 BPM | DIASTOLIC BLOOD PRESSURE: 68 MMHG | BODY MASS INDEX: 44.55 KG/M2 | SYSTOLIC BLOOD PRESSURE: 120 MMHG

## 2020-08-17 DIAGNOSIS — S60.222A CONTUSION OF LEFT HAND, INITIAL ENCOUNTER: Primary | ICD-10-CM

## 2020-08-17 PROCEDURE — 1036F TOBACCO NON-USER: CPT | Performed by: FAMILY MEDICINE

## 2020-08-17 PROCEDURE — 99213 OFFICE O/P EST LOW 20 MIN: CPT | Performed by: FAMILY MEDICINE

## 2020-08-17 PROCEDURE — 3078F DIAST BP <80 MM HG: CPT | Performed by: FAMILY MEDICINE

## 2020-08-17 PROCEDURE — 3074F SYST BP LT 130 MM HG: CPT | Performed by: FAMILY MEDICINE

## 2020-08-17 PROCEDURE — 3008F BODY MASS INDEX DOCD: CPT | Performed by: FAMILY MEDICINE

## 2020-08-17 PROCEDURE — 3725F SCREEN DEPRESSION PERFORMED: CPT | Performed by: FAMILY MEDICINE

## 2020-08-17 PROCEDURE — 3046F HEMOGLOBIN A1C LEVEL >9.0%: CPT | Performed by: FAMILY MEDICINE

## 2020-08-17 PROCEDURE — 3060F POS MICROALBUMINURIA REV: CPT | Performed by: FAMILY MEDICINE

## 2020-08-17 PROCEDURE — 1160F RVW MEDS BY RX/DR IN RCRD: CPT | Performed by: FAMILY MEDICINE

## 2020-08-17 NOTE — PROGRESS NOTES
Assessment/Plan:    No problem-specific Assessment & Plan notes found for this encounter  Diagnoses and all orders for this visit:    Contusion of left hand, initial encounter  Comments:  Reviewed  Xrays neg  D/C compression wrap  Start ROM exercises for fingers followed by icing for 10min  Recheck 2 weeks if not improved -earlier if worse          Subjective:      Patient ID: Vonnie Sandhu is a 70 y o  female  Last Thursday, pt got her L hand caught in her storm door  Pt had immediate pain and swelling  Pt went to local ED where Xrays were neg for fx  Pt had compression wrap and brace placed to control swelling  Pt here for f/u  Pt still with some discomfort in her hand  She has not removed the dressing since the visit  No distal weakness/numbness      The following portions of the patient's history were reviewed and updated as appropriate:   She  has a past medical history of Bereavement, uncomplicated, Coronary artery disease, Diabetes mellitus ([facility] ), Hyperlipidemia, Hypertension, Lymphedema, Morbid obesity ([facility] ), and Myocardial infarction ([facility] )  She   Patient Active Problem List    Diagnosis Date Noted    Atrial fibrillation ([facility] ) 05/18/2020    Coronary artery disease involving native coronary artery of native heart without angina pectoris 03/18/2020    Chronic right-sided low back pain with right-sided sciatica 01/17/2020    Morbid obesity, unspecified obesity type ([facility] ) 10/05/2016    Type 2 diabetes mellitus ([facility] ) 10/26/2015    Hyperlipidemia 12/06/2013    Allergic rhinitis 09/27/2013    Hypertension 07/31/2012    Esophageal reflux 07/31/2012     She  has a past surgical history that includes Cardiac catheterization; Hip surgery; and Tubal ligation  She  reports that she has never smoked  She has never used smokeless tobacco  She reports that she does not drink alcohol or use drugs    Current Outpatient Medications   Medication Sig Dispense Refill    amLODIPine (NORVASC) 5 mg tablet Take 1 tablet (5 mg total) by mouth daily 90 tablet 3    Ascorbic Acid (VITAMIN C) 500 MG CAPS Take 1 tablet by mouth daily      aspirin 81 MG tablet Take 1 tablet by mouth as needed Take 1 tablet weekly       glimepiride (AMARYL) 2 mg tablet Take 1 tablet (2 mg total) by mouth daily with breakfast 30 tablet 5    hydrochlorothiazide (HYDRODIURIL) 25 mg tablet Take 25 mg by mouth daily      lisinopril (ZESTRIL) 40 mg tablet Take 1 tablet (40 mg total) by mouth daily 90 tablet 3    metFORMIN (GLUCOPHAGE) 500 mg tablet Take 1 tablet (500 mg total) by mouth 2 (two) times a day 60 tablet 3    metoprolol succinate (TOPROL-XL) 100 mg 24 hr tablet Take 1 tablet (100 mg total) by mouth daily 90 tablet 4    terconazole (TERAZOL 7) 0 4 % vaginal cream Insert 1 applicator into the vagina daily at bedtime 45 g 1     No current facility-administered medications for this visit  She is allergic to jardiance [empagliflozin]; prandin [repaglinide]; and sulfa antibiotics       Review of Systems   Constitutional: Negative  Musculoskeletal: Positive for myalgias  Negative for arthralgias and joint swelling  L hand swelling/bruising s/p trauma   Skin:        Unknown - pt has not taken dressing off since the ED visit   Neurological: Negative for weakness and numbness  Objective:      /68   Pulse 72   Temp 98 1 °F (36 7 °C)   Ht 4' 11" (1 499 m)   Wt 100 kg (221 lb)   BMI 44 64 kg/m²          Physical Exam  Vitals signs reviewed  Constitutional:       Appearance: Normal appearance  Cardiovascular:      Pulses: Normal pulses  Musculoskeletal:      Left hand: She exhibits tenderness (mild over dorsal aspect of the hand with resolving ecchymosis noted) and swelling (mild dorsal )  She exhibits normal range of motion, normal capillary refill and no deformity   Decreased sensation is not present in the ulnar distribution, is not present in the medial redistribution and is not present in the radial distribution  Decreased strength noted  She exhibits no finger abduction, no thumb/finger opposition and no wrist extension trouble  Skin:     Capillary Refill: Capillary refill takes less than 2 seconds  Findings: Bruising (L dorsal hand) present  Neurological:      Mental Status: She is alert  Sensory: No sensory deficit  Motor: No weakness

## 2020-09-25 ENCOUNTER — APPOINTMENT (OUTPATIENT)
Dept: LAB | Facility: CLINIC | Age: 80
End: 2020-09-25
Payer: COMMERCIAL

## 2020-09-25 DIAGNOSIS — E11.9 TYPE 2 DIABETES MELLITUS WITHOUT COMPLICATION, UNSPECIFIED WHETHER LONG TERM INSULIN USE (HCC): ICD-10-CM

## 2020-09-25 LAB
ALBUMIN SERPL BCP-MCNC: 3.6 G/DL (ref 3.5–5)
ALP SERPL-CCNC: 49 U/L (ref 46–116)
ALT SERPL W P-5'-P-CCNC: 25 U/L (ref 12–78)
ANION GAP SERPL CALCULATED.3IONS-SCNC: 7 MMOL/L (ref 4–13)
AST SERPL W P-5'-P-CCNC: 24 U/L (ref 5–45)
BILIRUB SERPL-MCNC: 0.37 MG/DL (ref 0.2–1)
BUN SERPL-MCNC: 18 MG/DL (ref 5–25)
CALCIUM SERPL-MCNC: 9.6 MG/DL (ref 8.3–10.1)
CHLORIDE SERPL-SCNC: 106 MMOL/L (ref 100–108)
CHOLEST SERPL-MCNC: 232 MG/DL (ref 50–200)
CO2 SERPL-SCNC: 26 MMOL/L (ref 21–32)
CREAT SERPL-MCNC: 0.95 MG/DL (ref 0.6–1.3)
EST. AVERAGE GLUCOSE BLD GHB EST-MCNC: 163 MG/DL
GFR SERPL CREATININE-BSD FRML MDRD: 58 ML/MIN/1.73SQ M
GLUCOSE P FAST SERPL-MCNC: 134 MG/DL (ref 65–99)
HBA1C MFR BLD: 7.3 %
HDLC SERPL-MCNC: 37 MG/DL
LDLC SERPL CALC-MCNC: 141 MG/DL (ref 0–100)
NONHDLC SERPL-MCNC: 195 MG/DL
POTASSIUM SERPL-SCNC: 4.1 MMOL/L (ref 3.5–5.3)
PROT SERPL-MCNC: 7.4 G/DL (ref 6.4–8.2)
SODIUM SERPL-SCNC: 139 MMOL/L (ref 136–145)
TRIGL SERPL-MCNC: 272 MG/DL

## 2020-09-25 PROCEDURE — 80061 LIPID PANEL: CPT

## 2020-09-25 PROCEDURE — 36415 COLL VENOUS BLD VENIPUNCTURE: CPT

## 2020-09-25 PROCEDURE — 83036 HEMOGLOBIN GLYCOSYLATED A1C: CPT

## 2020-09-25 PROCEDURE — 80053 COMPREHEN METABOLIC PANEL: CPT

## 2020-09-29 ENCOUNTER — OFFICE VISIT (OUTPATIENT)
Dept: FAMILY MEDICINE CLINIC | Facility: CLINIC | Age: 80
End: 2020-09-29
Payer: COMMERCIAL

## 2020-09-29 VITALS
DIASTOLIC BLOOD PRESSURE: 64 MMHG | HEART RATE: 70 BPM | SYSTOLIC BLOOD PRESSURE: 120 MMHG | TEMPERATURE: 99.2 F | WEIGHT: 218 LBS | BODY MASS INDEX: 43.95 KG/M2 | HEIGHT: 59 IN

## 2020-09-29 DIAGNOSIS — E78.2 MIXED HYPERLIPIDEMIA: ICD-10-CM

## 2020-09-29 DIAGNOSIS — I10 ESSENTIAL HYPERTENSION: ICD-10-CM

## 2020-09-29 DIAGNOSIS — Z00.00 MEDICARE ANNUAL WELLNESS VISIT, SUBSEQUENT: Primary | ICD-10-CM

## 2020-09-29 DIAGNOSIS — I48.91 ATRIAL FIBRILLATION, UNSPECIFIED TYPE (HCC): ICD-10-CM

## 2020-09-29 DIAGNOSIS — F99 ABNORMAL MINI-MENTAL STATUS EXAM: ICD-10-CM

## 2020-09-29 DIAGNOSIS — E11.9 TYPE 2 DIABETES MELLITUS WITHOUT COMPLICATION, UNSPECIFIED WHETHER LONG TERM INSULIN USE (HCC): ICD-10-CM

## 2020-09-29 DIAGNOSIS — I25.10 CORONARY ARTERY DISEASE INVOLVING NATIVE CORONARY ARTERY OF NATIVE HEART WITHOUT ANGINA PECTORIS: ICD-10-CM

## 2020-09-29 PROCEDURE — 3725F SCREEN DEPRESSION PERFORMED: CPT | Performed by: FAMILY MEDICINE

## 2020-09-29 PROCEDURE — 1160F RVW MEDS BY RX/DR IN RCRD: CPT | Performed by: FAMILY MEDICINE

## 2020-09-29 PROCEDURE — 1170F FXNL STATUS ASSESSED: CPT | Performed by: FAMILY MEDICINE

## 2020-09-29 PROCEDURE — 1125F AMNT PAIN NOTED PAIN PRSNT: CPT | Performed by: FAMILY MEDICINE

## 2020-09-29 PROCEDURE — 99214 OFFICE O/P EST MOD 30 MIN: CPT | Performed by: FAMILY MEDICINE

## 2020-09-29 PROCEDURE — G0439 PPPS, SUBSEQ VISIT: HCPCS | Performed by: FAMILY MEDICINE

## 2020-09-29 PROCEDURE — 1036F TOBACCO NON-USER: CPT | Performed by: FAMILY MEDICINE

## 2020-09-29 PROCEDURE — 3078F DIAST BP <80 MM HG: CPT | Performed by: FAMILY MEDICINE

## 2020-09-29 NOTE — PROGRESS NOTES
Assessment and Plan:     Problem List Items Addressed This Visit        Endocrine    Type 2 diabetes mellitus ([facility] )       Lab Results   Component Value Date    HGBA1C 7 3 (H) 09/25/2020     Improved  Continue present meds  Urged diet control and increased activity as tolerated  Recheck labs in 3m - f/u in office in 6m - earlier if BGs are higher         Relevant Orders    Hemoglobin K5G    Basic metabolic panel       Cardiovascular and Mediastinum    Atrial fibrillation (HCC)     Pt clinically in NSR  Continue to monitor  Recheck 6m         Coronary artery disease involving native coronary artery of native heart without angina pectoris     Pt asymptomatic  She refuses to take a statin despite increase LDL and risk of worsening CAD  Pt understands risks  Recheck 6m         Hypertension     Well controlled  Cont present treatment  Monitor labs  Recheck 6m              Other    Hyperlipidemia     She refuses to take a statin despite increase LDL and risk of worsening CAD  Pt understands risks  Recheck 6m           Other Visit Diagnoses     Medicare annual wellness visit, subsequent    -  Primary    Abnormal mini-mental status exam        ?1/3 recall due to poor focus  Exam otherwise unremarkable  Recheck exam in 6m           Preventive health issues were discussed with patient, and age appropriate screening tests were ordered as noted in patient's After Visit Summary  Personalized health advice and appropriate referrals for health education or preventive services given if needed, as noted in patient's After Visit Summary       History of Present Illness:     Patient presents for Medicare Annual Wellness visit    Patient Care Team:  Parth Valadez MD as PCP - MD Selvin Massey MD Madelynn Mean, MD     Problem List:     Patient Active Problem List   Diagnosis    Hypertension    Hyperlipidemia    [facility]bid obesity[facility] type [facility]    Allergic rhinitis    Esophageal reflux    Type 2 diabetes mellitus (HCC)    Chronic right-sided low back pain with right-sided sciatica    Coronary artery disease involving native coronary artery of native heart without angina pectoris    Atrial fibrillation [facility])      Past Medical and Surgical History:     Past Medical History:   Diagnosis Date    Bereavement, uncomplicated     [id number]  RESOLVED    Coronary artery disease     [facility]     Hyperlipidemia     Hypertension     Lymphedema     Morbid obesity ([facility] )     Myocardial infarction ([facility] )      Past Surgical History:   Procedure Laterality Date    CARDIAC CATHETERIZATION      HIP SURGERY      TUBAL LIGATION        Family History:     Family History   Problem Relation Age of Onset    Heart disease Mother     Diabetes type II Mother     Hypertension Mother     Heart disease Sister     Diabetes type II Sister     Diabetes type II Brother     Diabetes type II Family       Social History:        Social History     Socioeconomic History    Marital status:      Spouse name: None    Number of children: None    Years of education: None    Highest education level: None   Occupational History    Occupation: RETIRED   Social Needs    Financial resource strain: None    Food insecurity     Worry: None     Inability: None    Transportation needs     Medical: None     Non-medical: None   Tobacco Use    Smoking status: Never Smoker    Smokeless tobacco: Never Used   Substance and Sexual Activity    Alcohol use: No    Drug use: Never    Sexual activity: Not Currently   Lifestyle    Physical activity     Days per week: None     Minutes per session: None    Stress: None   Relationships    Social connections     Talks on phone: None     Gets together: None     Attends Congregation service: None     Active member of club or organization: None     Attends meetings of clubs or organizations: None     Relationship status: None    Intimate partner violence     Fear of current or ex partner: None     Emotionally abused: None     Physically abused: None     Forced sexual activity: None   Other Topics Concern    None   Social History Narrative    DAILY TEA CONSUMPTION      Medications and Allergies:     Current Outpatient Medications   Medication Sig Dispense Refill    amLODIPine (NORVASC) 5 mg tablet Take 1 tablet (5 mg total) by mouth daily 90 tablet 3    Ascorbic Acid (VITAMIN C) 500 MG CAPS Take 1 tablet by mouth daily      aspirin 81 MG tablet Take 1 tablet by mouth as needed Take 1 tablet weekly       glimepiride (AMARYL) 2 mg tablet Take 1 tablet (2 mg total) by mouth daily with breakfast 30 tablet 5    hydrochlorothiazide (HYDRODIURIL) 25 mg tablet Take 25 mg by mouth daily      lisinopril (ZESTRIL) 40 mg tablet Take 1 tablet (40 mg total) by mouth daily 90 tablet 3    metFORMIN (GLUCOPHAGE) 500 mg tablet Take 1 tablet (500 mg total) by mouth 2 (two) times a day 60 tablet 3    metoprolol succinate (TOPROL-XL) 100 mg 24 hr tablet Take 1 tablet (100 mg total) by mouth daily 90 tablet 4    terconazole (TERAZOL 7) 0 4 % vaginal cream Insert 1 applicator into the vagina daily at bedtime 45 g 1     No current facility-administered medications for this visit        Allergies   Allergen Reactions    Jardiance [Empagliflozin]     Prandin [Repaglinide] Edema    Sulfa Antibiotics Other (See Comments)      Immunizations:     Immunization History   Administered Date(s) Administered    Tdap 06/04/2008, 01/19/2015      Health Maintenance:         Topic Date Due    DXA SCAN  1940    Cervical Cancer Screening  10/15/1963    Hepatitis C Screening  Completed         Topic Date Due    Pneumococcal Vaccine: 65+ Years (1 of 1 - PPSV23) 10/15/2007    Influenza Vaccine  07/01/2020      Medicare Health Risk Assessment:     /64   Pulse 70   Temp 99 2 °F (37 3 °C)   Ht 4' 11" (1 499 m)   Wt 98 9 kg (218 lb)   BMI 44 03 kg/m²      Candi is here for her Subsequent Wellness visit  Last Medicare Wellness visit information reviewed, patient interviewed and updates made to the record today  Health Risk Assessment:   Patient rates overall health as very good  Patient feels that their physical health rating is same  Eyesight was rated as same  Hearing was rated as same  Patient feels that their emotional and mental health rating is same  Pain experienced in the last 7 days has been some  Patient's pain rating has been 4/10  Patient states that she has experienced no weight loss or gain in last 6 months  Low back     Depression Screening:   PHQ-2 Score: 0      Fall Risk Screening: In the past year, patient has experienced: no history of falling in past year      Urinary Incontinence Screening:   Patient has not leaked urine accidently in the last six months  Home Safety:  Patient does not have trouble with stairs inside or outside of their home  Patient has working smoke alarms and has no working carbon monoxide detector  Home safety hazards include: none  Nutrition:   Current diet is Regular  Medications:   Patient is currently taking over-the-counter supplements  OTC medications include: see medication list  Patient is able to manage medications  Activities of Daily Living (ADLs)/Instrumental Activities of Daily Living (IADLs):   Walk and transfer into and out of bed and chair?: Yes  Dress and groom yourself?: Yes    Bathe or shower yourself?: Yes    Feed yourself?  Yes  Do your laundry/housekeeping?: Yes  Manage your money, pay your bills and track your expenses?: Yes  Make your own meals?: Yes    Do your own shopping?: Yes    Previous Hospitalizations:   Any hospitalizations or ED visits within the last 12 months?: No      Advance Care Planning:   Living will: No    Durable POA for healthcare: No    Advanced directive: No    Advanced directive counseling given: No    Five wishes given: Yes    End of Life Decisions reviewed with patient: Yes      Cognitive Screening:   Provider or family/friend/caregiver concerned regarding cognition?: No    PREVENTIVE SCREENINGS      Cardiovascular Screening:    General: Screening Not Indicated and History Lipid Disorder      Diabetes Screening:     General: Screening Not Indicated and History Diabetes      Colorectal Cancer Screening:     General: Patient Declines      Breast Cancer Screening:     General: Patient Declines      Cervical Cancer Screening:    General: Screening Not Indicated      Osteoporosis Screening:    General: Patient Declines      Abdominal Aortic Aneurysm (AAA) Screening:        General: Screening Not Indicated      Lung Cancer Screening:     General: Screening Not Indicated      Hepatitis C Screening:    General: Screening Current    Other Counseling Topics:   Skin self-exam and calcium and vitamin D intake and regular weightbearing exercise         Inderjit Tanner MD

## 2020-09-29 NOTE — ASSESSMENT & PLAN NOTE
She refuses to take a statin despite increase LDL and risk of worsening CAD  Pt understands risks   Recheck 6m

## 2020-09-29 NOTE — ASSESSMENT & PLAN NOTE
Pt asymptomatic  She refuses to take a statin despite increase LDL and risk of worsening CAD  Pt understands risks   Recheck 6m

## 2020-09-29 NOTE — PATIENT INSTRUCTIONS
Medicare Preventive Visit Patient Instructions  Thank you for completing your Welcome to Medicare Visit or Medicare Annual Wellness Visit today  Your next wellness visit will be due in one year (9/29/2021)  The screening/preventive services that you may require over the next 5-10 years are detailed below  Some tests may not apply to you based off risk factors and/or age  Screening tests ordered at today's visit but not completed yet may show as past due  Also, please note that scanned in results may not display below  Preventive Screenings:  Service Recommendations Previous Testing/Comments   Colorectal Cancer Screening  * Colonoscopy    * Fecal Occult Blood Test (FOBT)/Fecal Immunochemical Test (FIT)  * Fecal DNA/Cologuard Test  * Flexible Sigmoidoscopy Age: 54-65 years old   Colonoscopy: every 10 years (may be performed more frequently if at higher risk)  OR  FOBT/FIT: every 1 year  OR  Cologuard: every 3 years  OR  Sigmoidoscopy: every 5 years  Screening may be recommended earlier than age 48 if at higher risk for colorectal cancer  Also, an individualized decision between you and your healthcare provider will decide whether screening between the ages of 74-80 would be appropriate  Colonoscopy: Not on file  FOBT/FIT: Not on file  Cologuard: Not on file  Sigmoidoscopy: Not on file         Breast Cancer Screening Age: 36 years old  Frequency: every 1-2 years  Not required if history of left and right mastectomy Mammogram: Not on file       Cervical Cancer Screening Between the ages of 21-29, pap smear recommended once every 3 years  Between the ages of 33-67, can perform pap smear with HPV co-testing every 5 years     Recommendations may differ for women with a history of total hysterectomy, cervical cancer, or abnormal pap smears in past  Pap Smear: Not on file    Screening Not Indicated   Hepatitis C Screening Once for adults born between [facility]  More frequently in patients at high risk for Hepatitis C Hep C Antibody: 01/03/2020    Screening Current   Diabetes Screening 1-2 times per year if you're at risk for diabetes or have pre-diabetes Fasting glucose: 134 mg/dL   A1C: 7 3 %    Screening Not Indicated  History Diabetes   Cholesterol Screening Once every 5 years if you don't have a lipid disorder  May order more often based on risk factors  Lipid panel: 09/25/2020    Screening Not Indicated  History Lipid Disorder     Other Preventive Screenings Covered by Medicare:  1  Abdominal Aortic Aneurysm (AAA) Screening: covered once if your at risk  You're considered to be at risk if you have a family history of AAA  2  Lung Cancer Screening: covers low dose CT scan once per year if you meet all of the following conditions: (1) Age 50-69; (2) No signs or symptoms of lung cancer; (3) Current smoker or have quit smoking within the last 15 years; (4) You have a tobacco smoking history of at least 30 pack years (packs per day multiplied by number of years you smoked); (5) You get a written order from a healthcare provider  3  Glaucoma Screening: covered annually if you're considered high risk: (1) You have diabetes OR (2) Family history of glaucoma OR (3)  aged 48 and older OR (3)  American aged 72 and older  3  Osteoporosis Screening: covered every 2 years if you meet one of the following conditions: (1) You're estrogen deficient and at risk for osteoporosis based off medical history and other findings; (2) Have a vertebral abnormality; (3) On glucocorticoid therapy for more than 3 months; (4) Have primary hyperparathyroidism; (5) On osteoporosis medications and need to assess response to drug therapy  · Last bone density test (DXA Scan): Not on file  5  HIV Screening: covered annually if you're between the age of 12-76  Also covered annually if you are younger than 13 and older than 72 with risk factors for HIV infection   For pregnant patients, it is covered up to 3 times per pregnancy  Immunizations:  Immunization Recommendations   Influenza Vaccine Annual influenza vaccination during flu season is recommended for all persons aged >= 6 months who do not have contraindications   Pneumococcal Vaccine (Prevnar and Pneumovax)  * Prevnar = PCV13  * Pneumovax = PPSV23   Adults 25-60 years old: 1-3 doses may be recommended based on certain risk factors  Adults 72 years old: Prevnar (PCV13) vaccine recommended followed by Pneumovax (PPSV23) vaccine  If already received PPSV23 since turning 65, then PCV13 recommended at least one year after PPSV23 dose  Hepatitis B Vaccine 3 dose series if at intermediate or high risk (ex: diabetes, end stage renal disease, liver disease)   Tetanus (Td) Vaccine - COST NOT COVERED BY MEDICARE PART B Following completion of primary series, a booster dose should be given every 10 years to maintain immunity against tetanus  Td may also be given as tetanus wound prophylaxis  Tdap Vaccine - COST NOT COVERED BY MEDICARE PART B Recommended at least once for all adults  For pregnant patients, recommended with each pregnancy  Shingles Vaccine (Shingrix) - COST NOT COVERED BY MEDICARE PART B  2 shot series recommended in those aged 48 and above     Health Maintenance Due:      Topic Date Due    DXA SCAN  1940    Cervical Cancer Screening  10/15/1963    Hepatitis C Screening  Completed     Immunizations Due:      Topic Date Due    Pneumococcal Vaccine: 65+ Years (1 of 1 - PPSV23) 10/15/2007    Influenza Vaccine  07/01/2020     Advance Directives   What are advance directives? Advance directives are legal documents that state your wishes and plans for medical care  These plans are made ahead of time in case you lose your ability to make decisions for yourself  Advance directives can apply to any medical decision, such as the treatments you want, and if you want to donate organs  What are the types of advance directives?   There are many types of advance directives, and each state has rules about how to use them  You may choose a combination of any of the following:  · Living will: This is a written record of the treatment you want  You can also choose which treatments you do not want, which to limit, and which to stop at a certain time  This includes surgery, medicine, IV fluid, and tube feedings  · Durable power of  for healthcare [place] [facility]): This is a written record that states who you want to make healthcare choices for you when you are unable to make them for yourself  This person, called a proxy, is usually a family member or a friend  You may choose more than 1 proxy  · Do not resuscitate (DNR) order:  A DNR order is used in case your heart stops beating or you stop breathing  It is a request not to have certain forms of treatment, such as CPR  A DNR order may be included in other types of advance directives  · Medical directive: This covers the care that you want if you are in a coma, near death, or unable to make decisions for yourself  You can list the treatments you want for each condition  Treatment may include pain medicine, surgery, blood transfusions, dialysis, IV or tube feedings, and a ventilator (breathing machine)  · Values history: This document has questions about your views, beliefs, and how you feel and think about life  This information can help others choose the care that you would choose  Why are advance directives important? An advance directive helps you control your care  Although spoken wishes may be used, it is better to have your wishes written down  Spoken wishes can be misunderstood, or not followed  Treatments may be given even if you do not want them  An advance directive may make it easier for your family to make difficult choices about your care     Weight Management   Why it is important to manage your weight:  Being overweight increases your risk of health conditions such as heart disease, high blood pressure, type 2 diabetes, and certain types of cancer  It can also increase your risk for osteoarthritis, sleep apnea, and other respiratory problems  Aim for a slow, steady weight loss  Even a small amount of weight loss can lower your risk of health problems  How to lose weight safely:  A safe and healthy way to lose weight is to eat fewer calories and get regular exercise  You can lose up about 1 pound a week by decreasing the number of calories you eat by 500 calories each day  Healthy meal plan for weight management:  A healthy meal plan includes a variety of foods, contains fewer calories, and helps you stay healthy  A healthy meal plan includes the following:  · Eat whole-grain foods more often  A healthy meal plan should contain fiber  Fiber is the part of grains, fruits, and vegetables that is not broken down by your body  Whole-grain foods are healthy and provide extra fiber in your diet  Some examples of whole-grain foods are whole-wheat breads and pastas, oatmeal, brown rice, and bulgur  · Eat a variety of vegetables every day  Include dark, leafy greens such as spinach, kale, madelyn greens, and mustard greens  Eat yellow and orange vegetables such as carrots, sweet potatoes, and winter squash  · Eat a variety of fruits every day  Choose fresh or canned fruit (canned in its own juice or light syrup) instead of juice  Fruit juice has very little or no fiber  · Eat low-fat dairy foods  Drink fat-free (skim) milk or 1% milk  Eat fat-free yogurt and low-fat cottage cheese  Try low-fat cheeses such as mozzarella and other reduced-fat cheeses  · Choose meat and other protein foods that are low in fat  Choose beans or other legumes such as split peas or lentils  Choose fish, skinless poultry (chicken or turkey), or lean cuts of red meat (beef or pork)  Before you cook meat or poultry, cut off any visible fat  · Use less fat and oil  Try baking foods instead of frying them   Add less fat, such as margarine, sour cream, regular salad dressing and mayonnaise to foods  Eat fewer high-fat foods  Some examples of high-fat foods include french fries, doughnuts, ice cream, and cakes  · Eat fewer sweets  Limit foods and drinks that are high in sugar  This includes candy, cookies, regular soda, and sweetened drinks  Exercise:  Exercise at least 30 minutes per day on most days of the week  Some examples of exercise include walking, biking, dancing, and swimming  You can also fit in more physical activity by taking the stairs instead of the elevator or parking farther away from stores  Ask your healthcare provider about the best exercise plan for you  © Copyright [employer] 2018 Information is for End User's use only and may not be sold, redistributed or otherwise used for commercial purposes  All illustrations and images included in CareNotes® are the copyrighted property of [employer]  or [facility] Preventive Visit Patient Instructions  Thank you for completing your Welcome to Medicare Visit or Medicare Annual Wellness Visit today  Your next wellness visit will be due in one year (9/29/2021)  The screening/preventive services that you may require over the next 5-10 years are detailed below  Some tests may not apply to you based off risk factors and/or age  Screening tests ordered at today's visit but not completed yet may show as past due  Also, please note that scanned in results may not display below    Preventive Screenings:  Service Recommendations Previous Testing/Comments   Colorectal Cancer Screening  * Colonoscopy    * Fecal Occult Blood Test (FOBT)/Fecal Immunochemical Test (FIT)  * Fecal DNA/Cologuard Test  * Flexible Sigmoidoscopy Age: 54-65 years old   Colonoscopy: every 10 years (may be performed more frequently if at higher risk)  OR  FOBT/FIT: every 1 year  OR  Cologuard: every 3 years  OR  Sigmoidoscopy: every 5 years  Screening may be recommended earlier than age 48 if at higher risk for colorectal cancer  Also, an individualized decision between you and your healthcare provider will decide whether screening between the ages of 74-80 would be appropriate  Colonoscopy: Not on file  FOBT/FIT: Not on file  Cologuard: Not on file  Sigmoidoscopy: Not on file    Patient Declines     Breast Cancer Screening Age: 36 years old  Frequency: every 1-2 years  Not required if history of left and right mastectomy Mammogram: Not on file    Patient Declines   Cervical Cancer Screening Between the ages of 21-29, pap smear recommended once every 3 years  Between the ages of 33-67, can perform pap smear with HPV co-testing every 5 years  Recommendations may differ for women with a history of total hysterectomy, cervical cancer, or abnormal pap smears in past  Pap Smear: Not on file    Screening Not Indicated   Hepatitis C Screening Once for adults born between 1945 and 1965  More frequently in patients at high risk for Hepatitis C Hep C Antibody: 01/03/2020    Screening Current   Diabetes Screening 1-2 times per year if you're at risk for diabetes or have pre-diabetes Fasting glucose: 134 mg/dL   A1C: 7 3 %    Screening Not Indicated  History Diabetes   Cholesterol Screening Once every 5 years if you don't have a lipid disorder  May order more often based on risk factors  Lipid panel: 09/25/2020    Screening Not Indicated  History Lipid Disorder     Other Preventive Screenings Covered by Medicare:  6  Abdominal Aortic Aneurysm (AAA) Screening: covered once if your at risk  You're considered to be at risk if you have a family history of AAA    7  Lung Cancer Screening: covers low dose CT scan once per year if you meet all of the following conditions: (1) Age 50-69; (2) No signs or symptoms of lung cancer; (3) Current smoker or have quit smoking within the last 15 years; (4) You have a tobacco smoking history of at least 30 pack years (packs per day multiplied by number of years you smoked); (5) You get a written order from a healthcare provider  8  Glaucoma Screening: covered annually if you're considered high risk: (1) You have diabetes OR (2) Family history of glaucoma OR (3)  aged 48 and older OR (3)  American aged 72 and older  5  Osteoporosis Screening: covered every 2 years if you meet one of the following conditions: (1) You're estrogen deficient and at risk for osteoporosis based off medical history and other findings; (2) Have a vertebral abnormality; (3) On glucocorticoid therapy for more than 3 months; (4) Have primary hyperparathyroidism; (5) On osteoporosis medications and need to assess response to drug therapy  · Last bone density test (DXA Scan): Not on file  10  HIV Screening: covered annually if you're between the age of 12-76  Also covered annually if you are younger than 13 and older than 72 with risk factors for HIV infection  For pregnant patients, it is covered up to 3 times per pregnancy  Immunizations:  Immunization Recommendations   Influenza Vaccine Annual influenza vaccination during flu season is recommended for all persons aged >= 6 months who do not have contraindications   Pneumococcal Vaccine (Prevnar and Pneumovax)  * Prevnar = PCV13  * Pneumovax = PPSV23   Adults 25-60 years old: 1-3 doses may be recommended based on certain risk factors  Adults 72 years old: Prevnar (PCV13) vaccine recommended followed by Pneumovax (PPSV23) vaccine  If already received PPSV23 since turning 65, then PCV13 recommended at least one year after PPSV23 dose  Hepatitis B Vaccine 3 dose series if at intermediate or high risk (ex: diabetes, end stage renal disease, liver disease)   Tetanus (Td) Vaccine - COST NOT COVERED BY MEDICARE PART B Following completion of primary series, a booster dose should be given every 10 years to maintain immunity against tetanus  Td may also be given as tetanus wound prophylaxis     Tdap Vaccine - COST NOT COVERED BY MEDICARE PART B Recommended at least once for all adults  For pregnant patients, recommended with each pregnancy  Shingles Vaccine (Shingrix) - COST NOT COVERED BY MEDICARE PART B  2 shot series recommended in those aged 48 and above     Health Maintenance Due:      Topic Date Due    DXA SCAN  1940    Cervical Cancer Screening  10/15/1963    Hepatitis C Screening  Completed     Immunizations Due:      Topic Date Due    Pneumococcal Vaccine: 65+ Years (1 of 1 - PPSV23) 10/15/2007    Influenza Vaccine  07/01/2020     Advance Directives   What are advance directives? Advance directives are legal documents that state your wishes and plans for medical care  These plans are made ahead of time in case you lose your ability to make decisions for yourself  Advance directives can apply to any medical decision, such as the treatments you want, and if you want to donate organs  What are the types of advance directives? There are many types of advance directives, and each state has rules about how to use them  You may choose a combination of any of the following:  · Living will: This is a written record of the treatment you want  You can also choose which treatments you do not want, which to limit, and which to stop at a certain time  This includes surgery, medicine, IV fluid, and tube feedings  · Durable power of  for healthcare [place] [facility]): This is a written record that states who you want to make healthcare choices for you when you are unable to make them for yourself  This person, called a proxy, is usually a family member or a friend  You may choose more than 1 proxy  · Do not resuscitate (DNR) order:  A DNR order is used in case your heart stops beating or you stop breathing  It is a request not to have certain forms of treatment, such as CPR  A DNR order may be included in other types of advance directives  · Medical directive:   This covers the care that you want if you are in a coma, near death, or unable to make decisions for yourself  You can list the treatments you want for each condition  Treatment may include pain medicine, surgery, blood transfusions, dialysis, IV or tube feedings, and a ventilator (breathing machine)  · Values history: This document has questions about your views, beliefs, and how you feel and think about life  This information can help others choose the care that you would choose  Why are advance directives important? An advance directive helps you control your care  Although spoken wishes may be used, it is better to have your wishes written down  Spoken wishes can be misunderstood, or not followed  Treatments may be given even if you do not want them  An advance directive may make it easier for your family to make difficult choices about your care  Weight Management   Why it is important to manage your weight:  Being overweight increases your risk of health conditions such as heart disease, high blood pressure, type 2 diabetes, and certain types of cancer  It can also increase your risk for osteoarthritis, sleep apnea, and other respiratory problems  Aim for a slow, steady weight loss  Even a small amount of weight loss can lower your risk of health problems  How to lose weight safely:  A safe and healthy way to lose weight is to eat fewer calories and get regular exercise  You can lose up about 1 pound a week by decreasing the number of calories you eat by 500 calories each day  Healthy meal plan for weight management:  A healthy meal plan includes a variety of foods, contains fewer calories, and helps you stay healthy  A healthy meal plan includes the following:  · Eat whole-grain foods more often  A healthy meal plan should contain fiber  Fiber is the part of grains, fruits, and vegetables that is not broken down by your body  Whole-grain foods are healthy and provide extra fiber in your diet   Some examples of whole-grain foods are whole-wheat breads and pastas, oatmeal, brown rice, and bulgur  · Eat a variety of vegetables every day  Include dark, leafy greens such as spinach, kale, madelyn greens, and mustard greens  Eat yellow and orange vegetables such as carrots, sweet potatoes, and winter squash  · Eat a variety of fruits every day  Choose fresh or canned fruit (canned in its own juice or light syrup) instead of juice  Fruit juice has very little or no fiber  · Eat low-fat dairy foods  Drink fat-free (skim) milk or 1% milk  Eat fat-free yogurt and low-fat cottage cheese  Try low-fat cheeses such as mozzarella and other reduced-fat cheeses  · Choose meat and other protein foods that are low in fat  Choose beans or other legumes such as split peas or lentils  Choose fish, skinless poultry (chicken or turkey), or lean cuts of red meat (beef or pork)  Before you cook meat or poultry, cut off any visible fat  · Use less fat and oil  Try baking foods instead of frying them  Add less fat, such as margarine, sour cream, regular salad dressing and mayonnaise to foods  Eat fewer high-fat foods  Some examples of high-fat foods include french fries, doughnuts, ice cream, and cakes  · Eat fewer sweets  Limit foods and drinks that are high in sugar  This includes candy, cookies, regular soda, and sweetened drinks  Exercise:  Exercise at least 30 minutes per day on most days of the week  Some examples of exercise include walking, biking, dancing, and swimming  You can also fit in more physical activity by taking the stairs instead of the elevator or parking farther away from stores  Ask your healthcare provider about the best exercise plan for you  © Copyright [employer] 2018 Information is for End User's use only and may not be sold, redistributed or otherwise used for commercial purposes   All illustrations and images included in CareNotes® are the copyrighted property of [employer]  or [address] [place] [address]

## 2020-09-29 NOTE — PROGRESS NOTES
-Assessment/Plan:    Type 2 diabetes mellitus (HCC)    Lab Results   Component Value Date    HGBA1C 7 3 (H) 09/25/2020     Improved  Continue present meds  Urged diet control and increased activity as tolerated  Recheck labs in 3m - f/u in office in 6m - earlier if BGs are higher    Hypertension  Well controlled  Cont present treatment  Monitor labs  Recheck 6m      Coronary artery disease involving native coronary artery of native heart without angina pectoris  Pt asymptomatic  She refuses to take a statin despite increase LDL and risk of worsening CAD  Pt understands risks  Recheck 6m    Atrial fibrillation (HCC)  Pt clinically in NSR  Continue to monitor  Recheck 6m    Hyperlipidemia  She refuses to take a statin despite increase LDL and risk of worsening CAD  Pt understands risks  Recheck 6m       Diagnoses and all orders for this visit:    Medicare annual wellness visit, subsequent    Type 2 diabetes mellitus without complication, unspecified whether long term insulin use ([facility] )  -     Hemoglobin A1C; Future  -     Basic metabolic panel; Future    Essential hypertension    Coronary artery disease involving native coronary artery of native heart without angina pectoris    Atrial fibrillation, unspecified type ([facility] )    Mixed hyperlipidemia    Abnormal mini-mental status exam  Comments:  ?1/3 recall due to poor focus  Exam otherwise unremarkable  Recheck exam in 6m          Subjective:      Patient ID: Susie Muro is a 70 y o  female  f/u multiple med issues and AWV  - pt has been doing much better with her diet since last visit  Weight down 3-4 bs  I reviewed recent labs with pt - A1C now 7 3, TGs better but still sl higher than 270  Pt tolerating glyburide with metformin well  - Pt trying to remain active  She denies CP, palpitations, lightheadedness or other CV symptoms with or without exertion  - pt denies any worsening SOB  - no GI or  complaints  - I reviewed all recent labs with pt     - AWV done        The following portions of the patient's history were reviewed and updated as appropriate: She  has a past medical history of Bereavement, uncomplicated, Coronary artery disease, Diabetes mellitus ([place] [address] ), Hyperlipidemia, Hypertension, Lymphedema, Morbid obesity ([place] [address] ), and Myocardial infarction ([place] [address] )  She   Patient Active Problem List    Diagnosis Date Noted    Atrial fibrillation ([place] [address] ) 05/18/2020    Coronary artery disease involving native coronary artery of native heart without angina pectoris 03/18/2020    Chronic right-sided low back pain with right-sided sciatica 01/17/2020    Morbid obesity, unspecified obesity type ([place] [address] ) 10/05/2016    Type 2 diabetes mellitus ([place] [address] ) 10/26/2015    Hyperlipidemia 12/06/2013    Allergic rhinitis 09/27/2013    Hypertension 07/31/2012    Esophageal reflux 07/31/2012     She  has a past surgical history that includes Cardiac catheterization; Hip surgery; and Tubal ligation  She  reports that she has never smoked  She has never used smokeless tobacco  She reports that she does not drink alcohol or use drugs    Current Outpatient Medications   Medication Sig Dispense Refill    amLODIPine (NORVASC) 5 mg tablet Take 1 tablet (5 mg total) by mouth daily 90 tablet 3    Ascorbic Acid (VITAMIN C) 500 MG CAPS Take 1 tablet by mouth daily      aspirin 81 MG tablet Take 1 tablet by mouth as needed Take 1 tablet weekly       glimepiride (AMARYL) 2 mg tablet Take 1 tablet (2 mg total) by mouth daily with breakfast 30 tablet 5    hydrochlorothiazide (HYDRODIURIL) 25 mg tablet Take 25 mg by mouth daily      lisinopril (ZESTRIL) 40 mg tablet Take 1 tablet (40 mg total) by mouth daily 90 tablet 3    metFORMIN (GLUCOPHAGE) 500 mg tablet Take 1 tablet (500 mg total) by mouth 2 (two) times a day 60 tablet 3    metoprolol succinate (TOPROL-XL) 100 mg 24 hr tablet Take 1 tablet (100 mg total) by mouth daily 90 tablet 4    terconazole (TERAZOL 7) 0 4 % vaginal cream Insert 1 applicator into the vagina daily at bedtime 45 g 1     No current facility-administered medications for this visit  She is allergic to jardiance [empagliflozin]; prandin [repaglinide]; and sulfa antibiotics       Review of Systems   Constitutional: Negative  HENT: Negative  Eyes: Positive for visual disturbance (mild - followed by ophth)  Respiratory: Negative  Cardiovascular: Positive for leg swelling (trace R>L ankle)  Gastrointestinal: Negative  Endocrine: Negative  Genitourinary: Negative  Musculoskeletal: Positive for arthralgias, back pain and gait problem (ambulates with cane)  Skin: Negative  Allergic/Immunologic: Negative  Neurological: Negative for dizziness, weakness, light-headedness, numbness and headaches  Hematological: Negative  Psychiatric/Behavioral: Negative  Objective:      /64   Pulse 70   Temp 99 2 °F (37 3 °C)   Ht 4' 11" (1 499 m)   Wt 98 9 kg (218 lb)   BMI 44 03 kg/m²          Physical Exam  Vitals signs reviewed  Constitutional:       Appearance: She is well-developed  She is not diaphoretic  HENT:      Head: Normocephalic and atraumatic  Right Ear: Tympanic membrane, ear canal and external ear normal       Left Ear: Tympanic membrane, ear canal and external ear normal    Eyes:      Extraocular Movements: Extraocular movements intact  Conjunctiva/sclera: Conjunctivae normal       Pupils: Pupils are equal, round, and reactive to light  Neck:      Musculoskeletal: Normal range of motion and neck supple  No muscular tenderness  Thyroid: No thyromegaly  Vascular: No JVD  Cardiovascular:      Rate and Rhythm: Normal rate and regular rhythm  Heart sounds: No murmur  Comments: No irreg noted  Pulmonary:      Effort: Pulmonary effort is normal       Breath sounds: Normal breath sounds  Abdominal:      General: There is no distension  Palpations: Abdomen is soft  There is no mass  Tenderness: There is no abdominal tenderness  Musculoskeletal: Normal range of motion  General: Tenderness (mild along low lumbar spine) present  No swelling or deformity  Right lower leg: Edema (trace R ankle) present  Left lower leg: Edema (trace L ankle edema) present  Lymphadenopathy:      Cervical: No cervical adenopathy  Skin:     General: Skin is warm and dry  Capillary Refill: Capillary refill takes less than 2 seconds  Neurological:      Mental Status: She is alert and oriented to person, place, and time  Cranial Nerves: No cranial nerve deficit  Sensory: No sensory deficit  Motor: No weakness or abnormal muscle tone  Gait: Gait abnormal (ambulates with cane)  Deep Tendon Reflexes: Reflexes are normal and symmetric  Comments: [employer] 3/5   Psychiatric:         Mood and Affect: Mood normal          Behavior: Behavior normal          Thought Content:  Thought content normal          Judgment: Judgment normal       Comments: PHQ-2 = 0

## 2020-09-29 NOTE — ASSESSMENT & PLAN NOTE
Lab Results   Component Value Date    HGBA1C 7 3 (H) 09/25/2020     Improved  Continue present meds  Urged diet control and increased activity as tolerated   Recheck labs in 3m - f/u in office in 6m - earlier if BGs are higher

## 2020-10-16 DIAGNOSIS — I10 ESSENTIAL HYPERTENSION: Primary | ICD-10-CM

## 2020-10-16 RX ORDER — HYDROCHLOROTHIAZIDE 25 MG/1
25 TABLET ORAL DAILY
Qty: 90 TABLET | Refills: 2 | Status: SHIPPED | OUTPATIENT
Start: 2020-10-16 | End: 2021-07-15 | Stop reason: SDUPTHER

## 2020-10-22 LAB
LEFT EYE DIABETIC RETINOPATHY: NORMAL
RIGHT EYE DIABETIC RETINOPATHY: NORMAL

## 2020-12-10 ENCOUNTER — LAB (OUTPATIENT)
Dept: LAB | Facility: CLINIC | Age: 80
End: 2020-12-10
Payer: COMMERCIAL

## 2020-12-10 DIAGNOSIS — E11.9 TYPE 2 DIABETES MELLITUS WITHOUT COMPLICATION, UNSPECIFIED WHETHER LONG TERM INSULIN USE (HCC): ICD-10-CM

## 2020-12-10 LAB
ANION GAP SERPL CALCULATED.3IONS-SCNC: 6 MMOL/L (ref 4–13)
BUN SERPL-MCNC: 17 MG/DL (ref 5–25)
CALCIUM SERPL-MCNC: 10 MG/DL (ref 8.3–10.1)
CHLORIDE SERPL-SCNC: 104 MMOL/L (ref 100–108)
CO2 SERPL-SCNC: 29 MMOL/L (ref 21–32)
CREAT SERPL-MCNC: 0.96 MG/DL (ref 0.6–1.3)
EST. AVERAGE GLUCOSE BLD GHB EST-MCNC: 157 MG/DL
GFR SERPL CREATININE-BSD FRML MDRD: 57 ML/MIN/1.73SQ M
GLUCOSE P FAST SERPL-MCNC: 123 MG/DL (ref 65–99)
HBA1C MFR BLD: 7.1 %
POTASSIUM SERPL-SCNC: 4.1 MMOL/L (ref 3.5–5.3)
SODIUM SERPL-SCNC: 139 MMOL/L (ref 136–145)

## 2020-12-10 PROCEDURE — 36415 COLL VENOUS BLD VENIPUNCTURE: CPT

## 2020-12-10 PROCEDURE — 83036 HEMOGLOBIN GLYCOSYLATED A1C: CPT

## 2020-12-10 PROCEDURE — 80048 BASIC METABOLIC PNL TOTAL CA: CPT

## 2020-12-11 DIAGNOSIS — E11.9 TYPE 2 DIABETES MELLITUS WITHOUT COMPLICATION, UNSPECIFIED WHETHER LONG TERM INSULIN USE (HCC): ICD-10-CM

## 2020-12-11 RX ORDER — GLIMEPIRIDE 2 MG/1
2 TABLET ORAL
Qty: 30 TABLET | Refills: 5 | Status: SHIPPED | OUTPATIENT
Start: 2020-12-11 | End: 2021-06-03 | Stop reason: SDUPTHER

## 2021-01-18 ENCOUNTER — VBI (OUTPATIENT)
Dept: ADMINISTRATIVE | Facility: OTHER | Age: 81
End: 2021-01-18

## 2021-01-27 ENCOUNTER — VBI (OUTPATIENT)
Dept: ADMINISTRATIVE | Facility: OTHER | Age: 81
End: 2021-01-27

## 2021-03-02 LAB
LEFT EYE DIABETIC RETINOPATHY: NORMAL
RIGHT EYE DIABETIC RETINOPATHY: NORMAL

## 2021-03-04 DIAGNOSIS — E11.9 TYPE 2 DIABETES MELLITUS WITHOUT COMPLICATION, WITHOUT LONG-TERM CURRENT USE OF INSULIN (HCC): ICD-10-CM

## 2021-03-05 ENCOUNTER — OFFICE VISIT (OUTPATIENT)
Dept: FAMILY MEDICINE CLINIC | Facility: CLINIC | Age: 81
End: 2021-03-05
Payer: COMMERCIAL

## 2021-03-05 VITALS
WEIGHT: 230 LBS | DIASTOLIC BLOOD PRESSURE: 64 MMHG | OXYGEN SATURATION: 99 % | TEMPERATURE: 97.9 F | SYSTOLIC BLOOD PRESSURE: 124 MMHG | HEART RATE: 92 BPM | BODY MASS INDEX: 46.37 KG/M2 | HEIGHT: 59 IN

## 2021-03-05 DIAGNOSIS — H35.9 RETINAL LESION: Primary | ICD-10-CM

## 2021-03-05 DIAGNOSIS — E11.9 TYPE 2 DIABETES MELLITUS WITHOUT COMPLICATION, UNSPECIFIED WHETHER LONG TERM INSULIN USE (HCC): ICD-10-CM

## 2021-03-05 DIAGNOSIS — I10 ESSENTIAL HYPERTENSION: ICD-10-CM

## 2021-03-05 PROCEDURE — 3074F SYST BP LT 130 MM HG: CPT | Performed by: FAMILY MEDICINE

## 2021-03-05 PROCEDURE — 3078F DIAST BP <80 MM HG: CPT | Performed by: FAMILY MEDICINE

## 2021-03-05 PROCEDURE — 99214 OFFICE O/P EST MOD 30 MIN: CPT | Performed by: FAMILY MEDICINE

## 2021-03-05 NOTE — PROGRESS NOTES
Assessment/Plan:    Retinal lesion   By patient history  I could not of appreciate a lesion on exam though exam was limited  Unclear what kind of lesion was seen - ophthalmology note not available at time of visit  Blood pressure and blood sugars are better controlled  Will reach out to Ophthalmology to attempt to get office note  Continue present treatment for now  Follow-up with Ophthalmology  Recheck 3-4 weeks- earlier if needed  Type 2 diabetes mellitus (HCC)    Lab Results   Component Value Date    HGBA1C 7 1 (H) 12/10/2020       Blood sugars are suboptimal but greatly improved from a few years ago  Continue present treatment  Urged diet control  Recheck 1-2 months    Hypertension   Remains well controlled  Continue present treatment  Recheck 1-2 months       Diagnoses and all orders for this visit:    Retinal lesion    Type 2 diabetes mellitus without complication, unspecified whether long term insulin use (HCC)    Essential hypertension          Subjective:      Patient ID: Marietta Pollack is a 68 y o  female  Here to discuss recent ophth visit  - 67 yo female with hx of DMII and HTN was seen by ophth recently (Dr Von Hauser) who found a ?"spot" on her retina  Patient has history of cotton wool spots on the right that were previously treated  Patient unclear if she has a similar spot now on the left  Pt denies any recent head trauma  She has a history of floaters but denies any acute change in vision  She also denies any new neurologic symptoms  Blood sugars have been controlled recently but previously were poorly controlled  Blood pressures have been controlled recently as well    No other cardiovascular or neurologic complaints      The following portions of the patient's history were reviewed and updated as appropriate:   She  has a past medical history of Bereavement, uncomplicated, Coronary artery disease, Diabetes mellitus ([address] ), Hyperlipidemia, Hypertension, Lymphedema, Morbid obesity ([facility] ), and Myocardial infarction ([facility] )  She   Patient Active Problem List    Diagnosis Date Noted    Retinal lesion 03/07/2021    Coronary artery disease involving native coronary artery of native heart without angina pectoris 03/18/2020    Chronic right-sided low back pain with right-sided sciatica 01/17/2020    Morbid obesity, unspecified obesity type ([place] [address] ) 10/05/2016    Type 2 diabetes mellitus ([place] [address] ) 10/26/2015    Hyperlipidemia 12/06/2013    Allergic rhinitis 09/27/2013    Hypertension 07/31/2012    Esophageal reflux 07/31/2012     She  has a past surgical history that includes Cardiac catheterization; Hip surgery; and Tubal ligation  She  reports that she has never smoked  She has never used smokeless tobacco  She reports that she does not drink alcohol or use drugs  Current Outpatient Medications   Medication Sig Dispense Refill    amLODIPine (NORVASC) 5 mg tablet Take 1 tablet (5 mg total) by mouth daily 90 tablet 3    Ascorbic Acid (VITAMIN C) 500 MG CAPS Take 1 tablet by mouth daily      aspirin 81 MG tablet Take 1 tablet by mouth as needed Take 1 tablet weekly       glimepiride (AMARYL) 2 mg tablet Take 1 tablet (2 mg total) by mouth daily with breakfast 30 tablet 5    hydrochlorothiazide (HYDRODIURIL) 25 mg tablet Take 1 tablet (25 mg total) by mouth daily 90 tablet 2    lisinopril (ZESTRIL) 40 mg tablet Take 1 tablet (40 mg total) by mouth daily 90 tablet 3    metFORMIN (GLUCOPHAGE) 500 mg tablet Take 1 tablet (500 mg total) by mouth 2 (two) times a day 60 tablet 3    metoprolol succinate (TOPROL-XL) 100 mg 24 hr tablet Take 1 tablet (100 mg total) by mouth daily 90 tablet 4    terconazole (TERAZOL 7) 0 4 % vaginal cream Insert 1 applicator into the vagina daily at bedtime 45 g 1     No current facility-administered medications for this visit  She is allergic to jardiance [empagliflozin]; prandin [repaglinide]; and sulfa antibiotics       Review of Systems Constitutional: Negative  Eyes: Negative  Respiratory: Negative  Cardiovascular: Negative  Neurological: Negative  Objective:      /64   Pulse 92   Temp 97 9 °F (36 6 °C)   Ht 4' 11" (1 499 m)   Wt 104 kg (230 lb)   SpO2 99%   BMI 46 45 kg/m²          Physical Exam  Vitals signs reviewed  HENT:      Head: Normocephalic  Eyes:      Extraocular Movements: Extraocular movements intact  Conjunctiva/sclera: Conjunctivae normal       Pupils: Pupils are equal, round, and reactive to light  Comments: No retinal lesions appreciated  Neck:      Vascular: No carotid bruit  Cardiovascular:      Rate and Rhythm: Normal rate and regular rhythm  Pulses: Normal pulses  Pulmonary:      Effort: Pulmonary effort is normal    Lymphadenopathy:      Cervical: No cervical adenopathy  Neurological:      Mental Status: She is alert  Cranial Nerves: No cranial nerve deficit  Sensory: No sensory deficit  Motor: No weakness  Gait: Gait abnormal (ambulates with cane  Antalgic appearing gait)

## 2021-03-05 NOTE — PROGRESS NOTES
BMI Counseling: Body mass index is 46 45 kg/m²  The BMI is above normal  Nutrition recommendations include moderation in carbohydrate intake, increasing intake of lean protein, reducing intake of saturated fat and trans fat and reducing intake of cholesterol

## 2021-03-07 PROBLEM — H35.9 RETINAL LESION: Status: ACTIVE | Noted: 2021-03-07

## 2021-03-07 NOTE — ASSESSMENT & PLAN NOTE
By patient history  I could not of appreciate a lesion on exam though exam was limited  Unclear what kind of lesion was seen - ophthalmology note not available at time of visit  Blood pressure and blood sugars are better controlled  Will reach out to Ophthalmology to attempt to get office note  Continue present treatment for now  Follow-up with Ophthalmology  Recheck 3-4 weeks- earlier if needed

## 2021-03-07 NOTE — ASSESSMENT & PLAN NOTE
Lab Results   Component Value Date    HGBA1C 7 1 (H) 12/10/2020       Blood sugars are suboptimal but greatly improved from a few years ago  Continue present treatment  Urged diet control    Recheck 1-2 months

## 2021-03-08 ENCOUNTER — TELEPHONE (OUTPATIENT)
Dept: FAMILY MEDICINE CLINIC | Facility: CLINIC | Age: 81
End: 2021-03-08

## 2021-03-08 NOTE — TELEPHONE ENCOUNTER
----- Message from Yessi Bermudez MD sent at 3/7/2021  9:38 AM EST -----  Please reach out to Dr Deandre Torrez office and get a copy of her recent note  Put copy on my desk to review    Thanks

## 2021-03-16 ENCOUNTER — TELEPHONE (OUTPATIENT)
Dept: FAMILY MEDICINE CLINIC | Facility: CLINIC | Age: 81
End: 2021-03-16

## 2021-03-16 NOTE — TELEPHONE ENCOUNTER
----- Message from oRse Mark MD sent at 3/16/2021 12:52 PM EDT -----  I received Dr Amy Baca notes  Did she given the patient labs or other studies to get done?   If not, please let me know - there are some labs that she should get done

## 2021-03-24 ENCOUNTER — OFFICE VISIT (OUTPATIENT)
Dept: CARDIOLOGY CLINIC | Facility: CLINIC | Age: 81
End: 2021-03-24
Payer: COMMERCIAL

## 2021-03-24 VITALS
BODY MASS INDEX: 45.96 KG/M2 | SYSTOLIC BLOOD PRESSURE: 110 MMHG | WEIGHT: 228 LBS | HEIGHT: 59 IN | DIASTOLIC BLOOD PRESSURE: 60 MMHG | HEART RATE: 91 BPM

## 2021-03-24 DIAGNOSIS — I10 ESSENTIAL HYPERTENSION: Primary | ICD-10-CM

## 2021-03-24 DIAGNOSIS — I25.10 CORONARY ARTERY DISEASE INVOLVING NATIVE CORONARY ARTERY OF NATIVE HEART WITHOUT ANGINA PECTORIS: ICD-10-CM

## 2021-03-24 DIAGNOSIS — E66.01 MORBID OBESITY, UNSPECIFIED OBESITY TYPE (HCC): ICD-10-CM

## 2021-03-24 DIAGNOSIS — E78.2 MIXED HYPERLIPIDEMIA: ICD-10-CM

## 2021-03-24 PROCEDURE — 99213 OFFICE O/P EST LOW 20 MIN: CPT | Performed by: INTERNAL MEDICINE

## 2021-03-24 PROCEDURE — 1036F TOBACCO NON-USER: CPT | Performed by: INTERNAL MEDICINE

## 2021-03-24 PROCEDURE — 1160F RVW MEDS BY RX/DR IN RCRD: CPT | Performed by: INTERNAL MEDICINE

## 2021-03-24 PROCEDURE — 93000 ELECTROCARDIOGRAM COMPLETE: CPT | Performed by: INTERNAL MEDICINE

## 2021-03-24 NOTE — PROGRESS NOTES
Cardiology Follow Up    [facility] KANE Carpenter  1940  [id number]  [facility]  [id number] [place] [id number] [phone]    1  Essential hypertension  POCT ECG   2  Coronary artery disease involving native coronary artery of native heart without angina pectoris  POCT ECG   3  Morbid obesity, unspecified obesity type ([facility] )  POCT ECG   4  Mixed hyperlipidemia           Discussion/Summary: All of her assessed cardiac problems are stable  I have reviewed her medications and made no changes  No cardiac testing is ordered  RTO 1 year  Interval History: She has not had any cardiac problems since her last OV  She denies CP, SOB, Her weight is up from 215 to 228  /60  She will not take statins and will only take ASA once a week at best   She has CAD with L Cx stenting in 2010  Hgb A1C is 7 1  Creatinine 0 96      Patient Active Problem List   Diagnosis    Hypertension    Hyperlipidemia    Morbid obesity, unspecified obesity type ([facility] )    Allergic rhinitis    Esophageal reflux    Type 2 diabetes mellitus ([facility] )    Chronic right-sided low back pain with right-sided sciatica    Coronary artery disease involving native coronary artery of native heart without angina pectoris    Retinal lesion     Past Medical History:   Diagnosis Date    Bereavement, uncomplicated     [id number]  RESOLVED    Coronary artery disease     Diabetes mellitus ([facility] )     Hyperlipidemia     Hypertension     Lymphedema     Morbid obesity ([facility] )     Myocardial infarction ([facility] )      Social History     Socioeconomic History    Marital status:      Spouse name: Not on file    Number of children: Not on file    Years of education: Not on file    Highest education level: Not on file   Occupational History    Occupation: RETIRED   Social Needs    Financial resource strain: Not on file    Food insecurity     Worry: Not on file     Inability: Not on file    Transportation needs     Medical: Not on file     Non-medical: Not on file   Tobacco Use    Smoking status: Never Smoker    Smokeless tobacco: Never Used   Substance and Sexual Activity    Alcohol use: No    Drug use: Never    Sexual activity: Not Currently   Lifestyle    Physical activity     Days per week: Not on file     Minutes per session: Not on file    Stress: Not on file   Relationships    Social connections     Talks on phone: Not on file     Gets together: Not on file     Attends Church service: Not on file     Active member of club or organization: Not on file     Attends meetings of clubs or organizations: Not on file     Relationship status: Not on file    Intimate partner violence     Fear of current or ex partner: Not on file     Emotionally abused: Not on file     Physically abused: Not on file     Forced sexual activity: Not on file   Other Topics Concern    Not on file   Social History Narrative    [employer]ILY [employer]      Family History   Problem Relation Age of Onset    Heart disease Mother     Diabetes type II Mother     Hypertension Mother     Heart disease Sister     Diabetes type II Sister     Diabetes type II Brother     Diabetes type II Family      Past Surgical History:   Procedure Laterality Date    CARDIAC CATHETERIZATION      HIP SURGERY      TUBAL LIGATION         Current Outpatient Medications:     amLODIPine (NORVASC) 5 mg tablet, Take 1 tablet (5 mg total) by mouth daily, Disp: 90 tablet, Rfl: 3    Ascorbic Acid (VITAMIN C) 500 MG CAPS, Take 1 tablet by mouth daily, Disp: , Rfl:     aspirin 81 MG tablet, Take 1 tablet by mouth as needed Take 1 tablet weekly , Disp: , Rfl:     glimepiride (AMARYL) 2 mg tablet, Take 1 tablet (2 mg total) by mouth daily with breakfast, Disp: 30 tablet, Rfl: 5    hydrochlorothiazide (HYDRODIURIL) 25 mg tablet, Take 1 tablet (25 mg total) by mouth daily, Disp: 90 tablet, Rfl: 2   lisinopril (ZESTRIL) 40 mg tablet, Take 1 tablet (40 mg total) by mouth daily, Disp: 90 tablet, Rfl: 3    metFORMIN (GLUCOPHAGE) 500 mg tablet, Take 1 tablet (500 mg total) by mouth 2 (two) times a day (Patient taking differently: Take 500 mg by mouth daily with breakfast ), Disp: 60 tablet, Rfl: 3    metoprolol succinate (TOPROL-XL) 100 mg 24 hr tablet, Take 1 tablet (100 mg total) by mouth daily, Disp: 90 tablet, Rfl: 4    terconazole (TERAZOL 7) 0 4 % vaginal cream, Insert 1 applicator into the vagina daily at bedtime (Patient not taking: Reported on 3/24/2021), Disp: 45 g, Rfl: 1  Allergies   Allergen Reactions    Jardiance [Empagliflozin]     Prandin [Repaglinide] Edema    Sulfa Antibiotics Other (See Comments)     Vitals:    03/24/21 0959   BP: 110/60   BP Location: Left arm   Cuff Size: Large   Pulse: 91   Weight: 103 kg (228 lb)   Height: 4' 11" (1 499 m)     Weight (last 2 days)     Date/Time   Weight    03/24/21 0959   103 (228)             Blood pressure 110/60, pulse 91, height 4' 11" (1 499 m), weight 103 kg (228 lb), not currently breastfeeding , Body mass index is 46 05 kg/m²      Labs:  Orders Only on 03/02/2021   Component Date Value    Right Eye Diabetic Retin* 03/02/2021 None     Left Eye Diabetic Retino* 03/02/2021 None    Lab on 12/10/2020   Component Date Value    Hemoglobin A1C 12/10/2020 7 1*    EAG 12/10/2020 157     Sodium 12/10/2020 139     Potassium 12/10/2020 4 1     Chloride 12/10/2020 104     CO2 12/10/2020 29     ANION GAP 12/10/2020 6     BUN 12/10/2020 17     Creatinine 12/10/2020 0 96     Glucose, Fasting 12/10/2020 123*    Calcium 12/10/2020 10 0     eGFR 12/10/2020 57    Orders Only on 10/22/2020   Component Date Value    Right Eye Diabetic Retin* 10/22/2020 None     Left Eye Diabetic Retino* 10/22/2020 None    Appointment on 09/25/2020   Component Date Value    Sodium 09/25/2020 139     Potassium 09/25/2020 4 1     Chloride 09/25/2020 106     CO2 09/25/2020 26     ANION GAP 09/25/2020 7     BUN 09/25/2020 18     Creatinine 09/25/2020 0 95     Glucose, Fasting 09/25/2020 134*    Calcium 09/25/2020 9 6     AST 09/25/2020 24     ALT 09/25/2020 25     Alkaline Phosphatase 09/25/2020 49     Total Protein 09/25/2020 7 4     Albumin 09/25/2020 3 6     Total Bilirubin 09/25/2020 0 37     eGFR 09/25/2020 58     Hemoglobin A1C 09/25/2020 7 3*    EAG 09/25/2020 163     Cholesterol 09/25/2020 232*    Triglycerides 09/25/2020 272*    HDL, Direct 09/25/2020 37*    LDL Calculated 09/25/2020 141*    Non-HDL-Chol (CHOL-HDL) 09/25/2020 195      Imaging: No results found  Review of Systems:  Review of Systems   Constitutional: Negative for diaphoresis, fatigue, fever and unexpected weight change  HENT: Negative  Respiratory: Negative for cough, shortness of breath and wheezing  Cardiovascular: Negative for chest pain, palpitations and leg swelling  Gastrointestinal: Negative for abdominal pain, diarrhea and nausea  Musculoskeletal: Negative for gait problem and myalgias  Skin: Negative for rash  Neurological: Negative for dizziness and numbness  Psychiatric/Behavioral: Negative  Physical Exam:  Physical Exam  Constitutional:       Appearance: She is well-developed  HENT:      Head: Normocephalic and atraumatic  Eyes:      Pupils: Pupils are equal, round, and reactive to light  Neck:      Musculoskeletal: Normal range of motion and neck supple  Vascular: No JVD  Cardiovascular:      Rate and Rhythm: Regular rhythm  Pulses: Normal pulses  Carotid pulses are 2+ on the right side and 2+ on the left side  Heart sounds: S1 normal and S2 normal    Pulmonary:      Effort: Pulmonary effort is normal       Breath sounds: Normal breath sounds  No wheezing or rales  Abdominal:      General: Bowel sounds are normal       Palpations: Abdomen is soft  Tenderness: There is no abdominal tenderness  Musculoskeletal: Normal range of motion  General: No tenderness  Skin:     General: Skin is warm  Neurological:      Mental Status: She is alert and oriented to person, place, and time  Cranial Nerves: No cranial nerve deficit  Deep Tendon Reflexes: Reflexes are normal and symmetric

## 2021-04-09 DIAGNOSIS — H35.81 COTTON WOOL EXUDATES: Primary | ICD-10-CM

## 2021-04-09 NOTE — TELEPHONE ENCOUNTER
Patient is seeing Dr Caroline Wong on Tuesday  She would like to know if you do want her to get labs done   She uses a [facility]

## 2021-04-12 ENCOUNTER — APPOINTMENT (OUTPATIENT)
Dept: LAB | Facility: CLINIC | Age: 81
End: 2021-04-12
Payer: COMMERCIAL

## 2021-04-12 DIAGNOSIS — H35.81 COTTON WOOL EXUDATES: ICD-10-CM

## 2021-04-12 LAB
ALBUMIN SERPL BCP-MCNC: 3.6 G/DL (ref 3.5–5)
ALP SERPL-CCNC: 54 U/L (ref 46–116)
ALT SERPL W P-5'-P-CCNC: 18 U/L (ref 12–78)
ANION GAP SERPL CALCULATED.3IONS-SCNC: 6 MMOL/L (ref 4–13)
AST SERPL W P-5'-P-CCNC: 19 U/L (ref 5–45)
BASOPHILS # BLD AUTO: 0.05 THOUSANDS/ΜL (ref 0–0.1)
BASOPHILS NFR BLD AUTO: 1 % (ref 0–1)
BILIRUB SERPL-MCNC: 0.68 MG/DL (ref 0.2–1)
BUN SERPL-MCNC: 17 MG/DL (ref 5–25)
CALCIUM SERPL-MCNC: 9 MG/DL (ref 8.3–10.1)
CHLORIDE SERPL-SCNC: 105 MMOL/L (ref 100–108)
CO2 SERPL-SCNC: 27 MMOL/L (ref 21–32)
CREAT SERPL-MCNC: 0.93 MG/DL (ref 0.6–1.3)
CRP SERPL QL: 12.6 MG/L
EOSINOPHIL # BLD AUTO: 0.34 THOUSAND/ΜL (ref 0–0.61)
EOSINOPHIL NFR BLD AUTO: 4 % (ref 0–6)
ERYTHROCYTE [DISTWIDTH] IN BLOOD BY AUTOMATED COUNT: 12.8 % (ref 11.6–15.1)
GFR SERPL CREATININE-BSD FRML MDRD: 59 ML/MIN/1.73SQ M
GLUCOSE P FAST SERPL-MCNC: 139 MG/DL (ref 65–99)
HCT VFR BLD AUTO: 38.4 % (ref 34.8–46.1)
HGB BLD-MCNC: 12.1 G/DL (ref 11.5–15.4)
IMM GRANULOCYTES # BLD AUTO: 0.02 THOUSAND/UL (ref 0–0.2)
IMM GRANULOCYTES NFR BLD AUTO: 0 % (ref 0–2)
LYMPHOCYTES # BLD AUTO: 2.41 THOUSANDS/ΜL (ref 0.6–4.47)
LYMPHOCYTES NFR BLD AUTO: 29 % (ref 14–44)
MCH RBC QN AUTO: 30.2 PG (ref 26.8–34.3)
MCHC RBC AUTO-ENTMCNC: 31.5 G/DL (ref 31.4–37.4)
MCV RBC AUTO: 96 FL (ref 82–98)
MONOCYTES # BLD AUTO: 0.7 THOUSAND/ΜL (ref 0.17–1.22)
MONOCYTES NFR BLD AUTO: 9 % (ref 4–12)
NEUTROPHILS # BLD AUTO: 4.75 THOUSANDS/ΜL (ref 1.85–7.62)
NEUTS SEG NFR BLD AUTO: 57 % (ref 43–75)
NRBC BLD AUTO-RTO: 0 /100 WBCS
PLATELET # BLD AUTO: 226 THOUSANDS/UL (ref 149–390)
PMV BLD AUTO: 12.7 FL (ref 8.9–12.7)
POTASSIUM SERPL-SCNC: 4.1 MMOL/L (ref 3.5–5.3)
PROT SERPL-MCNC: 7 G/DL (ref 6.4–8.2)
RBC # BLD AUTO: 4.01 MILLION/UL (ref 3.81–5.12)
SODIUM SERPL-SCNC: 138 MMOL/L (ref 136–145)
WBC # BLD AUTO: 8.27 THOUSAND/UL (ref 4.31–10.16)

## 2021-04-12 PROCEDURE — 86039 ANTINUCLEAR ANTIBODIES (ANA): CPT

## 2021-04-12 PROCEDURE — 80053 COMPREHEN METABOLIC PANEL: CPT

## 2021-04-12 PROCEDURE — 86038 ANTINUCLEAR ANTIBODIES: CPT

## 2021-04-12 PROCEDURE — 86140 C-REACTIVE PROTEIN: CPT

## 2021-04-12 PROCEDURE — 36415 COLL VENOUS BLD VENIPUNCTURE: CPT

## 2021-04-12 PROCEDURE — 85025 COMPLETE CBC W/AUTO DIFF WBC: CPT

## 2021-04-13 LAB
LEFT EYE DIABETIC RETINOPATHY: NORMAL
RIGHT EYE DIABETIC RETINOPATHY: NORMAL

## 2021-04-14 LAB
ANA HOMOGEN SER QL IF: NORMAL
ANA HOMOGEN TITR SER: NORMAL {TITER}
RYE IGE QN: POSITIVE

## 2021-04-16 DIAGNOSIS — R76.8 ELEVATED ANTINUCLEAR ANTIBODY (ANA) LEVEL: ICD-10-CM

## 2021-04-16 DIAGNOSIS — H35.81 COTTON WOOL EXUDATES: Primary | ICD-10-CM

## 2021-04-16 DIAGNOSIS — R79.82 ELEVATED C-REACTIVE PROTEIN (CRP): ICD-10-CM

## 2021-04-22 DIAGNOSIS — I10 HYPERTENSION, UNSPECIFIED TYPE: ICD-10-CM

## 2021-04-23 DIAGNOSIS — I10 HYPERTENSION, UNSPECIFIED TYPE: ICD-10-CM

## 2021-04-23 RX ORDER — AMLODIPINE BESYLATE 5 MG/1
5 TABLET ORAL DAILY
Qty: 90 TABLET | Refills: 3 | Status: SHIPPED | OUTPATIENT
Start: 2021-04-23 | End: 2021-07-15 | Stop reason: SDUPTHER

## 2021-04-24 RX ORDER — LISINOPRIL 40 MG/1
40 TABLET ORAL DAILY
Qty: 90 TABLET | Refills: 3 | OUTPATIENT
Start: 2021-04-24 | End: 2021-05-10 | Stop reason: SDUPTHER

## 2021-05-10 DIAGNOSIS — I10 HYPERTENSION, UNSPECIFIED TYPE: ICD-10-CM

## 2021-05-10 RX ORDER — LISINOPRIL 40 MG/1
40 TABLET ORAL DAILY
Qty: 90 TABLET | Refills: 3 | Status: SHIPPED | OUTPATIENT
Start: 2021-05-10 | End: 2022-05-06 | Stop reason: SDUPTHER

## 2021-05-11 LAB
LEFT EYE DIABETIC RETINOPATHY: NORMAL
RIGHT EYE DIABETIC RETINOPATHY: NORMAL

## 2021-06-03 DIAGNOSIS — E11.9 TYPE 2 DIABETES MELLITUS WITHOUT COMPLICATION, UNSPECIFIED WHETHER LONG TERM INSULIN USE (HCC): ICD-10-CM

## 2021-06-03 RX ORDER — GLIMEPIRIDE 2 MG/1
2 TABLET ORAL
Qty: 30 TABLET | Refills: 5 | Status: SHIPPED | OUTPATIENT
Start: 2021-06-03 | End: 2021-12-14 | Stop reason: SDUPTHER

## 2021-06-05 DIAGNOSIS — E11.9 TYPE 2 DIABETES MELLITUS WITHOUT COMPLICATION, UNSPECIFIED WHETHER LONG TERM INSULIN USE (HCC): ICD-10-CM

## 2021-06-05 RX ORDER — GLIMEPIRIDE 2 MG/1
2 TABLET ORAL
Qty: 30 TABLET | Refills: 0 | Status: CANCELLED | OUTPATIENT
Start: 2021-06-05

## 2021-06-07 ENCOUNTER — RA CDI HCC (OUTPATIENT)
Dept: OTHER | Facility: HOSPITAL | Age: 81
End: 2021-06-07

## 2021-06-07 NOTE — PROGRESS NOTES
Based on clinical documentation indicated in your record, it appears that the patient may have the following conditions not coded in 2021:    I48 91 Atrial fibrillation     If this is correct, please document and assess at your next visit June 14th  [facility]  coding opportunities             Chart reviewed, (number of) suggestions sent to provider: 1           Patients insurance company: [employer] (Medicare Advantage and Commercial)             [facility]  coding opportunities             Chart reviewed, (number of) suggestions sent to provider: 1               Number of suggestions NOT actually used: 1     Patients insurance company: [employer] (Medicare Advantage and Commercial)     Visit status: Patient arrived for their scheduled appointment     Provider never responded to [place] [id number]  coding request

## 2021-06-14 ENCOUNTER — OFFICE VISIT (OUTPATIENT)
Dept: FAMILY MEDICINE CLINIC | Facility: CLINIC | Age: 81
End: 2021-06-14
Payer: COMMERCIAL

## 2021-06-14 VITALS
HEART RATE: 86 BPM | SYSTOLIC BLOOD PRESSURE: 120 MMHG | TEMPERATURE: 98.5 F | DIASTOLIC BLOOD PRESSURE: 72 MMHG | HEIGHT: 59 IN | WEIGHT: 226 LBS | BODY MASS INDEX: 45.56 KG/M2

## 2021-06-14 DIAGNOSIS — E11.9 TYPE 2 DIABETES MELLITUS WITHOUT COMPLICATION, UNSPECIFIED WHETHER LONG TERM INSULIN USE (HCC): Primary | ICD-10-CM

## 2021-06-14 DIAGNOSIS — I10 ESSENTIAL HYPERTENSION: ICD-10-CM

## 2021-06-14 DIAGNOSIS — H35.81 COTTON WOOL EXUDATES: ICD-10-CM

## 2021-06-14 LAB — SL AMB POCT HEMOGLOBIN AIC: 7.5 (ref ?–6.5)

## 2021-06-14 PROCEDURE — 3078F DIAST BP <80 MM HG: CPT | Performed by: FAMILY MEDICINE

## 2021-06-14 PROCEDURE — 3074F SYST BP LT 130 MM HG: CPT | Performed by: FAMILY MEDICINE

## 2021-06-14 PROCEDURE — 99214 OFFICE O/P EST MOD 30 MIN: CPT | Performed by: FAMILY MEDICINE

## 2021-06-14 PROCEDURE — 1160F RVW MEDS BY RX/DR IN RCRD: CPT | Performed by: FAMILY MEDICINE

## 2021-06-14 PROCEDURE — 83036 HEMOGLOBIN GLYCOSYLATED A1C: CPT | Performed by: FAMILY MEDICINE

## 2021-06-14 NOTE — PROGRESS NOTES
Assessment/Plan:    Type 2 diabetes mellitus ([facility] [address] )    Lab Results   Component Value Date    HGBA1C 7 5 (A) 06/14/2021     Remains suboptimal but stable  Continue to monitor diet  Discussed weight loss  BMI Counseling: Body mass index is 45 65 kg/m²  The BMI is above normal  Nutrition recommendations include moderation in carbohydrate intake, increasing intake of lean protein, reducing intake of saturated fat and trans fat and reducing intake of cholesterol  Recheck 3m      Hypertension  Well controlled  Cont present treatment  Monitor labs  Recheck 6m      Cotton wool exudates  EFLICITAS and CRP elevated - r/o small vessel arterial disease  Rheum consult done but pt was not called - will reach out to rheum and see if we can help schedule eval  Recheck 3m       Diagnoses and all orders for this visit:    Type 2 diabetes mellitus without complication, unspecified whether long term insulin use (HCC)  -     POCT hemoglobin A1c  -     Comprehensive metabolic panel; Future  -     Lipid panel; Future    Cotton wool exudates  -     CBC and differential; Future  -     Comprehensive metabolic panel; Future  -     Lipid panel; Future    Essential hypertension  -     CBC and differential; Future  -     Comprehensive metabolic panel; Future  -     Lipid panel; Future          Subjective:      Patient ID: Tatiana Chahal is a 68 y o  female  f/u multiple med issues   - pt has been doing well  - continues to try to monitor diet but may still cheat  Weight down 2 more lbs  A1C in the office today = 7 6  Due for other labs  - still with  L wrist pain since "slamming it in a door" approx 9m ago  Pain is burning and can radiate up the arm  XR was neg for fx  Pt had a large bruise in the area at the time of the injury  - pt is not exercising  She denies CP, palpitations, lightheadedness or other CV symptoms with or without exertion  - pt denies any worsening SOB  No increased cough     - no new GI or  complaints            The following portions of the patient's history were reviewed and updated as appropriate:   She  has a past medical history of Bereavement, uncomplicated, Coronary artery disease, Diabetes mellitus ([place] [address] ), Hyperlipidemia, Hypertension, Lymphedema, Morbid obesity ([place] [address] ), and Myocardial infarction ([place] [address] )  She   Patient Active Problem List    Diagnosis Date Noted    Cotton wool exudates 06/15/2021    Retinal lesion 03/07/2021    Coronary artery disease involving native coronary artery of native heart without angina pectoris 03/18/2020    Chronic right-sided low back pain with right-sided sciatica 01/17/2020    Morbid obesity, unspecified obesity type ([place] [address] ) 10/05/2016    Type 2 diabetes mellitus ([place] [address] ) 10/26/2015    Hyperlipidemia 12/06/2013    Allergic rhinitis 09/27/2013    Hypertension 07/31/2012    Esophageal reflux 07/31/2012     She  has a past surgical history that includes Cardiac catheterization; Hip surgery; and Tubal ligation  She  reports that she has never smoked  She has never used smokeless tobacco  She reports that she does not drink alcohol and does not use drugs    Current Outpatient Medications   Medication Sig Dispense Refill    amLODIPine (NORVASC) 5 mg tablet Take 1 tablet (5 mg total) by mouth daily 90 tablet 3    Ascorbic Acid (VITAMIN C) 500 MG CAPS Take 1 tablet by mouth daily      aspirin 81 MG tablet Take 1 tablet by mouth as needed Take 1 tablet weekly       glimepiride (AMARYL) 2 mg tablet Take 1 tablet (2 mg total) by mouth daily with breakfast 30 tablet 5    hydrochlorothiazide (HYDRODIURIL) 25 mg tablet Take 1 tablet (25 mg total) by mouth daily 90 tablet 2    lisinopril (ZESTRIL) 40 mg tablet Take 1 tablet (40 mg total) by mouth daily 90 tablet 3    metFORMIN (GLUCOPHAGE) 500 mg tablet Take 1 tablet (500 mg total) by mouth 2 (two) times a day (Patient taking differently: Take 500 mg by mouth daily with breakfast ) 60 tablet 3    metoprolol succinate (TOPROL-XL) 100 mg 24 hr tablet Take 1 tablet (100 mg total) by mouth daily 90 tablet 4    terconazole (TERAZOL 7) 0 4 % vaginal cream Insert 1 applicator into the vagina daily at bedtime 45 g 1     No current facility-administered medications for this visit  She is allergic to jardiance [empagliflozin], prandin [repaglinide], and sulfa antibiotics       Review of Systems   Constitutional: Negative  HENT: Negative  Eyes: Negative  Respiratory: Negative  Cardiovascular: Negative  Gastrointestinal: Negative  Endocrine: Negative  Genitourinary: Negative  Musculoskeletal: Positive for arthralgias, back pain, gait problem and myalgias  Skin: Negative  Allergic/Immunologic: Negative  Neurological: Negative for dizziness, facial asymmetry, weakness, numbness and headaches  Hematological: Negative  Psychiatric/Behavioral: Negative  Objective:      /72   Pulse 86   Temp 98 5 °F (36 9 °C)   Ht 4' 11" (1 499 m)   Wt 103 kg (226 lb)   BMI 45 65 kg/m²          Physical Exam  Vitals reviewed  Constitutional:       Appearance: She is well-developed  She is not diaphoretic  HENT:      Head: Normocephalic and atraumatic  Right Ear: Tympanic membrane, ear canal and external ear normal       Left Ear: Tympanic membrane, ear canal and external ear normal    Eyes:      Extraocular Movements: Extraocular movements intact  Conjunctiva/sclera: Conjunctivae normal       Pupils: Pupils are equal, round, and reactive to light  Neck:      Thyroid: No thyromegaly  Vascular: No carotid bruit or JVD  Cardiovascular:      Rate and Rhythm: Normal rate and regular rhythm  Pulses: Normal pulses  Heart sounds: No murmur heard  Pulmonary:      Effort: Pulmonary effort is normal       Breath sounds: Normal breath sounds  Abdominal:      General: There is no distension  Palpations: Abdomen is soft  There is no mass  Tenderness: There is no abdominal tenderness  Musculoskeletal:         General: Tenderness (over the dorsal aspect of the L wrist  No effusion) and deformity (mild arthritic changes in hands) present  No swelling  Normal range of motion  Cervical back: Normal range of motion and neck supple  No tenderness  No muscular tenderness  Right lower leg: Edema (trace) present  Left lower leg: Edema (trace) present  Lymphadenopathy:      Cervical: No cervical adenopathy  Skin:     General: Skin is warm and dry  Capillary Refill: Capillary refill takes less than 2 seconds  Neurological:      Mental Status: She is alert and oriented to person, place, and time  Cranial Nerves: No cranial nerve deficit  Sensory: No sensory deficit  Motor: No weakness or abnormal muscle tone  Gait: Gait abnormal (mildly antalgic appearing gait - ambulates with cane)  Deep Tendon Reflexes: Reflexes are normal and symmetric  Psychiatric:         Mood and Affect: Mood normal          Behavior: Behavior normal          Thought Content:  Thought content normal          Judgment: Judgment normal

## 2021-06-15 ENCOUNTER — TELEPHONE (OUTPATIENT)
Dept: FAMILY MEDICINE CLINIC | Facility: CLINIC | Age: 81
End: 2021-06-15

## 2021-06-15 PROBLEM — H35.81: Status: ACTIVE | Noted: 2021-06-15

## 2021-06-15 NOTE — TELEPHONE ENCOUNTER
----- Message from Bakari Mata MD sent at 6/14/2021  4:06 PM EDT -----  I had put a referral in to Rheum for a Dr Manish Peraza in [facility] in April - pt has not been called and I do not see any messages in the notes  Can we call to set her up?

## 2021-06-15 NOTE — ASSESSMENT & PLAN NOTE
FELICITAS and CRP elevated - r/o small vessel arterial disease   Rheum consult done but pt was not called - will reach out to rheum and see if we can help schedule eval  Recheck 3m

## 2021-06-15 NOTE — TELEPHONE ENCOUNTER
Spoke to rheum, they tried calling patient 3x and was unable to reach patient or leave message   They suggest patient call the office directly

## 2021-06-15 NOTE — TELEPHONE ENCOUNTER
----- Message from Marley Martinez MD sent at 6/14/2021  4:06 PM EDT -----  I had put a referral in to Rheum for a Dr Edda Bateman in [facility] pass back in April - pt has not been called and I do not see any messages in the notes  Can we call to set her up?

## 2021-06-15 NOTE — ASSESSMENT & PLAN NOTE
Lab Results   Component Value Date    HGBA1C 7 5 (A) 06/14/2021     Remains suboptimal but stable  Continue to monitor diet  Discussed weight loss  BMI Counseling: Body mass index is 45 65 kg/m²  The BMI is above normal  Nutrition recommendations include moderation in carbohydrate intake, increasing intake of lean protein, reducing intake of saturated fat and trans fat and reducing intake of cholesterol    Recheck 3m

## 2021-07-15 DIAGNOSIS — I10 ESSENTIAL HYPERTENSION: ICD-10-CM

## 2021-07-15 DIAGNOSIS — I10 HYPERTENSION, UNSPECIFIED TYPE: ICD-10-CM

## 2021-07-15 RX ORDER — AMLODIPINE BESYLATE 5 MG/1
5 TABLET ORAL DAILY
Qty: 90 TABLET | Refills: 3 | Status: SHIPPED | OUTPATIENT
Start: 2021-07-15 | End: 2022-07-18 | Stop reason: SDUPTHER

## 2021-07-16 RX ORDER — HYDROCHLOROTHIAZIDE 25 MG/1
25 TABLET ORAL DAILY
Qty: 90 TABLET | Refills: 4 | Status: SHIPPED | OUTPATIENT
Start: 2021-07-16 | End: 2022-07-18 | Stop reason: SDUPTHER

## 2021-08-31 DIAGNOSIS — I48.91 ATRIAL FIBRILLATION, UNSPECIFIED TYPE (HCC): ICD-10-CM

## 2021-09-01 RX ORDER — METOPROLOL SUCCINATE 100 MG/1
100 TABLET, EXTENDED RELEASE ORAL DAILY
Qty: 90 TABLET | Refills: 4 | Status: SHIPPED | OUTPATIENT
Start: 2021-09-01

## 2021-10-05 ENCOUNTER — VBI (OUTPATIENT)
Dept: ADMINISTRATIVE | Facility: OTHER | Age: 81
End: 2021-10-05

## 2021-10-08 ENCOUNTER — APPOINTMENT (OUTPATIENT)
Dept: LAB | Facility: CLINIC | Age: 81
End: 2021-10-08
Payer: COMMERCIAL

## 2021-10-08 DIAGNOSIS — I10 ESSENTIAL HYPERTENSION: ICD-10-CM

## 2021-10-08 DIAGNOSIS — E11.9 TYPE 2 DIABETES MELLITUS WITHOUT COMPLICATION, UNSPECIFIED WHETHER LONG TERM INSULIN USE (HCC): ICD-10-CM

## 2021-10-08 DIAGNOSIS — H35.81 COTTON WOOL EXUDATES: ICD-10-CM

## 2021-10-08 LAB
ALBUMIN SERPL BCP-MCNC: 3.4 G/DL (ref 3.5–5)
ALP SERPL-CCNC: 63 U/L (ref 46–116)
ALT SERPL W P-5'-P-CCNC: 20 U/L (ref 12–78)
ANION GAP SERPL CALCULATED.3IONS-SCNC: 6 MMOL/L (ref 4–13)
AST SERPL W P-5'-P-CCNC: 20 U/L (ref 5–45)
BASOPHILS # BLD AUTO: 0.06 THOUSANDS/ΜL (ref 0–0.1)
BASOPHILS NFR BLD AUTO: 1 % (ref 0–1)
BILIRUB SERPL-MCNC: 0.44 MG/DL (ref 0.2–1)
BUN SERPL-MCNC: 19 MG/DL (ref 5–25)
CALCIUM ALBUM COR SERPL-MCNC: 10.1 MG/DL (ref 8.3–10.1)
CALCIUM SERPL-MCNC: 9.6 MG/DL (ref 8.3–10.1)
CHLORIDE SERPL-SCNC: 104 MMOL/L (ref 100–108)
CHOLEST SERPL-MCNC: 221 MG/DL (ref 50–200)
CO2 SERPL-SCNC: 26 MMOL/L (ref 21–32)
CREAT SERPL-MCNC: 1.08 MG/DL (ref 0.6–1.3)
EOSINOPHIL # BLD AUTO: 0.41 THOUSAND/ΜL (ref 0–0.61)
EOSINOPHIL NFR BLD AUTO: 5 % (ref 0–6)
ERYTHROCYTE [DISTWIDTH] IN BLOOD BY AUTOMATED COUNT: 13 % (ref 11.6–15.1)
EST. AVERAGE GLUCOSE BLD GHB EST-MCNC: 180 MG/DL
GFR SERPL CREATININE-BSD FRML MDRD: 49 ML/MIN/1.73SQ M
GLUCOSE P FAST SERPL-MCNC: 146 MG/DL (ref 65–99)
HBA1C MFR BLD: 7.9 %
HCT VFR BLD AUTO: 38.2 % (ref 34.8–46.1)
HDLC SERPL-MCNC: 39 MG/DL
HGB BLD-MCNC: 12.1 G/DL (ref 11.5–15.4)
IMM GRANULOCYTES # BLD AUTO: 0.03 THOUSAND/UL (ref 0–0.2)
IMM GRANULOCYTES NFR BLD AUTO: 0 % (ref 0–2)
LDLC SERPL CALC-MCNC: 126 MG/DL (ref 0–100)
LYMPHOCYTES # BLD AUTO: 2.6 THOUSANDS/ΜL (ref 0.6–4.47)
LYMPHOCYTES NFR BLD AUTO: 30 % (ref 14–44)
MCH RBC QN AUTO: 29.9 PG (ref 26.8–34.3)
MCHC RBC AUTO-ENTMCNC: 31.7 G/DL (ref 31.4–37.4)
MCV RBC AUTO: 94 FL (ref 82–98)
MONOCYTES # BLD AUTO: 0.77 THOUSAND/ΜL (ref 0.17–1.22)
MONOCYTES NFR BLD AUTO: 9 % (ref 4–12)
NEUTROPHILS # BLD AUTO: 4.8 THOUSANDS/ΜL (ref 1.85–7.62)
NEUTS SEG NFR BLD AUTO: 55 % (ref 43–75)
NONHDLC SERPL-MCNC: 182 MG/DL
NRBC BLD AUTO-RTO: 0 /100 WBCS
PLATELET # BLD AUTO: 228 THOUSANDS/UL (ref 149–390)
PMV BLD AUTO: 12.6 FL (ref 8.9–12.7)
POTASSIUM SERPL-SCNC: 4.5 MMOL/L (ref 3.5–5.3)
PROT SERPL-MCNC: 7.3 G/DL (ref 6.4–8.2)
RBC # BLD AUTO: 4.05 MILLION/UL (ref 3.81–5.12)
SODIUM SERPL-SCNC: 136 MMOL/L (ref 136–145)
TRIGL SERPL-MCNC: 282 MG/DL
WBC # BLD AUTO: 8.67 THOUSAND/UL (ref 4.31–10.16)

## 2021-10-08 PROCEDURE — 85025 COMPLETE CBC W/AUTO DIFF WBC: CPT

## 2021-10-08 PROCEDURE — 36415 COLL VENOUS BLD VENIPUNCTURE: CPT

## 2021-10-08 PROCEDURE — 80053 COMPREHEN METABOLIC PANEL: CPT

## 2021-10-08 PROCEDURE — 80061 LIPID PANEL: CPT

## 2021-10-08 PROCEDURE — 83036 HEMOGLOBIN GLYCOSYLATED A1C: CPT

## 2021-11-18 DIAGNOSIS — E11.9 TYPE 2 DIABETES MELLITUS WITHOUT COMPLICATION, WITHOUT LONG-TERM CURRENT USE OF INSULIN (HCC): ICD-10-CM

## 2021-11-24 ENCOUNTER — RA CDI HCC (OUTPATIENT)
Dept: OTHER | Facility: HOSPITAL | Age: 81
End: 2021-11-24

## 2021-12-02 ENCOUNTER — APPOINTMENT (OUTPATIENT)
Dept: RADIOLOGY | Facility: MEDICAL CENTER | Age: 81
End: 2021-12-02
Payer: COMMERCIAL

## 2021-12-02 ENCOUNTER — OFFICE VISIT (OUTPATIENT)
Dept: FAMILY MEDICINE CLINIC | Facility: CLINIC | Age: 81
End: 2021-12-02
Payer: COMMERCIAL

## 2021-12-02 VITALS
WEIGHT: 223 LBS | SYSTOLIC BLOOD PRESSURE: 126 MMHG | DIASTOLIC BLOOD PRESSURE: 74 MMHG | TEMPERATURE: 98.1 F | BODY MASS INDEX: 44.96 KG/M2 | HEIGHT: 59 IN | HEART RATE: 76 BPM

## 2021-12-02 DIAGNOSIS — M25.532 LEFT WRIST PAIN: ICD-10-CM

## 2021-12-02 DIAGNOSIS — E66.01 MORBID OBESITY, UNSPECIFIED OBESITY TYPE (HCC): ICD-10-CM

## 2021-12-02 DIAGNOSIS — I25.10 CORONARY ARTERY DISEASE INVOLVING NATIVE CORONARY ARTERY OF NATIVE HEART WITHOUT ANGINA PECTORIS: ICD-10-CM

## 2021-12-02 DIAGNOSIS — Z00.00 MEDICARE ANNUAL WELLNESS VISIT, SUBSEQUENT: ICD-10-CM

## 2021-12-02 DIAGNOSIS — I10 PRIMARY HYPERTENSION: ICD-10-CM

## 2021-12-02 DIAGNOSIS — E11.9 TYPE 2 DIABETES MELLITUS WITHOUT COMPLICATION, UNSPECIFIED WHETHER LONG TERM INSULIN USE (HCC): Primary | ICD-10-CM

## 2021-12-02 PROBLEM — Z28.21 VACCINATION NOT CARRIED OUT BECAUSE OF PATIENT REFUSAL: Status: ACTIVE | Noted: 2021-12-02

## 2021-12-02 PROCEDURE — 1160F RVW MEDS BY RX/DR IN RCRD: CPT | Performed by: FAMILY MEDICINE

## 2021-12-02 PROCEDURE — 3725F SCREEN DEPRESSION PERFORMED: CPT | Performed by: FAMILY MEDICINE

## 2021-12-02 PROCEDURE — 1036F TOBACCO NON-USER: CPT | Performed by: FAMILY MEDICINE

## 2021-12-02 PROCEDURE — 1170F FXNL STATUS ASSESSED: CPT | Performed by: FAMILY MEDICINE

## 2021-12-02 PROCEDURE — 3288F FALL RISK ASSESSMENT DOCD: CPT | Performed by: FAMILY MEDICINE

## 2021-12-02 PROCEDURE — 3074F SYST BP LT 130 MM HG: CPT | Performed by: FAMILY MEDICINE

## 2021-12-02 PROCEDURE — 99214 OFFICE O/P EST MOD 30 MIN: CPT | Performed by: FAMILY MEDICINE

## 2021-12-02 PROCEDURE — G0439 PPPS, SUBSEQ VISIT: HCPCS | Performed by: FAMILY MEDICINE

## 2021-12-02 PROCEDURE — 73110 X-RAY EXAM OF WRIST: CPT

## 2021-12-02 PROCEDURE — 1125F AMNT PAIN NOTED PAIN PRSNT: CPT | Performed by: FAMILY MEDICINE

## 2021-12-02 PROCEDURE — 3078F DIAST BP <80 MM HG: CPT | Performed by: FAMILY MEDICINE

## 2021-12-14 DIAGNOSIS — E11.9 TYPE 2 DIABETES MELLITUS WITHOUT COMPLICATION, UNSPECIFIED WHETHER LONG TERM INSULIN USE (HCC): ICD-10-CM

## 2021-12-14 RX ORDER — GLIMEPIRIDE 2 MG/1
2 TABLET ORAL
Qty: 30 TABLET | Refills: 5 | Status: SHIPPED | OUTPATIENT
Start: 2021-12-14 | End: 2022-06-10 | Stop reason: SDUPTHER

## 2022-01-11 ENCOUNTER — TELEPHONE (OUTPATIENT)
Dept: FAMILY MEDICINE CLINIC | Facility: CLINIC | Age: 82
End: 2022-01-11

## 2022-01-11 NOTE — TELEPHONE ENCOUNTER
Called patient  She bought black lavon castor oil for her hair and started using that topically on Sunday  She states that the irritation started after using this and she has not used it since   She did start using cool compresses and has noticed improvement already

## 2022-01-11 NOTE — TELEPHONE ENCOUNTER
Any new foods, facial products, detergents or meds? Insect bites?   She should use benadryl and cool compresses rather than heat

## 2022-01-11 NOTE — TELEPHONE ENCOUNTER
Pt called stating yesterday she started with minor swelling around her eye  Stated this a m  she woke up to both eyes swollen from forehead to cheekbones  States they feel itchy and she does have nasal congestion  Denies fever, sore throat, cough, chest congestion, ear pain, n/v/d, headache and SOB  Has tried warm compresses but it's not helping

## 2022-03-23 ENCOUNTER — OFFICE VISIT (OUTPATIENT)
Dept: CARDIOLOGY CLINIC | Facility: CLINIC | Age: 82
End: 2022-03-23
Payer: COMMERCIAL

## 2022-03-23 VITALS
WEIGHT: 222.2 LBS | BODY MASS INDEX: 44.8 KG/M2 | SYSTOLIC BLOOD PRESSURE: 124 MMHG | HEART RATE: 85 BPM | DIASTOLIC BLOOD PRESSURE: 76 MMHG | HEIGHT: 59 IN

## 2022-03-23 DIAGNOSIS — I10 PRIMARY HYPERTENSION: Primary | ICD-10-CM

## 2022-03-23 DIAGNOSIS — I25.10 CORONARY ARTERY DISEASE INVOLVING NATIVE CORONARY ARTERY OF NATIVE HEART WITHOUT ANGINA PECTORIS: ICD-10-CM

## 2022-03-23 DIAGNOSIS — E78.2 MIXED HYPERLIPIDEMIA: ICD-10-CM

## 2022-03-23 PROCEDURE — 3078F DIAST BP <80 MM HG: CPT | Performed by: INTERNAL MEDICINE

## 2022-03-23 PROCEDURE — 3074F SYST BP LT 130 MM HG: CPT | Performed by: INTERNAL MEDICINE

## 2022-03-23 PROCEDURE — 93000 ELECTROCARDIOGRAM COMPLETE: CPT | Performed by: INTERNAL MEDICINE

## 2022-03-23 PROCEDURE — 1036F TOBACCO NON-USER: CPT | Performed by: INTERNAL MEDICINE

## 2022-03-23 PROCEDURE — 1160F RVW MEDS BY RX/DR IN RCRD: CPT | Performed by: INTERNAL MEDICINE

## 2022-03-23 PROCEDURE — 99213 OFFICE O/P EST LOW 20 MIN: CPT | Performed by: INTERNAL MEDICINE

## 2022-03-23 NOTE — PROGRESS NOTES
Cardiology Follow Up    [facility] KANE Carpenter  1940  [id number]  [facility]  [address]   [phone]    1  Primary hypertension  POCT ECG   2  Coronary artery disease involving native coronary artery of native heart without angina pectoris  POCT ECG   3  Mixed hyperlipidemia  POCT ECG         Discussion/Summary: All of her assessed cardiac problems are stable  I have reviewed her medications and made no changes  No cardiac testing is ordered  RTO 1 year  Interval History: She has not had any cardiac problems since her last OV  She denies CP, SOB, palpitations, LE edema  She has CAD with L Cx stenting in 2010  LDL is 126  She refuses to take statins  /76  Her weight is donw from 228 to 222 lbs      Patient Active Problem List   Diagnosis    Hypertension    Hyperlipidemia    Morbid obesity, unspecified obesity type ([address] )    Allergic rhinitis    Esophageal reflux    Type 2 diabetes mellitus ([address] )    Chronic right-sided low back pain with right-sided sciatica    Coronary artery disease involving native coronary artery of native heart without angina pectoris    Retinal lesion    Cotton wool exudates    Vaccination not carried out because of patient refusal    Left wrist pain     Past Medical History:   Diagnosis Date    Bereavement, uncomplicated     [id number]  RESOLVED    Coronary artery disease     Diabetes mellitus ([address] )     Hyperlipidemia     Hypertension     Lymphedema     Morbid obesity ([address] )     Myocardial infarction ([address] )      Social History     Socioeconomic History    Marital status:      Spouse name: Not on file    Number of children: Not on file    Years of education: Not on file    Highest education level: Not on file   Occupational History    Occupation: RETIRED   Tobacco Use    Smoking status: Never Smoker    Smokeless tobacco: Never Used   Substance and Sexual Activity    Alcohol use: No    Drug use: Never    Sexual activity: Not Currently   Other Topics Concern    Not on file   Social History Narrative    [employer]     Social Determinants of Health     Financial Resource Strain: Not on file   Food Insecurity: Not on file   Transportation Needs: Not on file   Physical Activity: Not on file   Stress: Not on file   Social Connections: Not on file   Intimate Partner Violence: Not on file   Housing Stability: Not on file      Family History   Problem Relation Age of Onset    Heart disease Mother     Diabetes type II Mother     Hypertension Mother     Heart disease Sister     Diabetes type II Sister     Diabetes type II Brother     Diabetes type II Family      Past Surgical History:   Procedure Laterality Date    CARDIAC CATHETERIZATION      HIP SURGERY      TUBAL LIGATION         Current Outpatient Medications:     amLODIPine (NORVASC) 5 mg tablet, Take 1 tablet (5 mg total) by mouth daily, Disp: 90 tablet, Rfl: 3    Ascorbic Acid (VITAMIN C) 500 MG CAPS, Take 1 tablet by mouth daily, Disp: , Rfl:     aspirin 81 MG tablet, Take 1 tablet by mouth as needed Take 1 tablet weekly , Disp: , Rfl:     glimepiride (AMARYL) 2 mg tablet, Take 1 tablet (2 mg total) by mouth daily with breakfast, Disp: 30 tablet, Rfl: 5    hydrochlorothiazide (HYDRODIURIL) 25 mg tablet, Take 1 tablet (25 mg total) by mouth daily, Disp: 90 tablet, Rfl: 4    lisinopril (ZESTRIL) 40 mg tablet, Take 1 tablet (40 mg total) by mouth daily, Disp: 90 tablet, Rfl: 3    metFORMIN (GLUCOPHAGE) 1000 MG tablet, Take 1 tablet (1,000 mg total) by mouth 2 (two) times a day with meals (Patient taking differently: Take 500 mg by mouth daily  ), Disp: 180 tablet, Rfl: 1    metoprolol succinate (TOPROL-XL) 100 mg 24 hr tablet, Take 1 tablet (100 mg total) by mouth daily, Disp: 90 tablet, Rfl: 4    terconazole (TERAZOL 7) 0 4 % vaginal cream, Insert 1 applicator into the vagina daily at bedtime (Patient not taking: Reported on 3/23/2022 ), Disp: 45 g, Rfl: 1  Allergies   Allergen Reactions    Jardiance [Empagliflozin]     Prandin [Repaglinide] Edema    Sulfa Antibiotics Other (See Comments)     Vitals:    03/23/22 0847   BP: 124/76   BP Location: Left arm   Patient Position: Sitting   Cuff Size: Standard   Pulse: 85   Weight: 101 kg (222 lb 3 2 oz)   Height: 4' 11" (1 499 m)     Weight (last 2 days)     Date/Time Weight    03/23/22 0847 101 (222 2)         Blood pressure 124/76, pulse 85, height 4' 11" (1 499 m), weight 101 kg (222 lb 3 2 oz), not currently breastfeeding , Body mass index is 44 88 kg/m²      Labs:  Appointment on 10/08/2021   Component Date Value    WBC 10/08/2021 8 67     RBC 10/08/2021 4 05     Hemoglobin 10/08/2021 12 1     Hematocrit 10/08/2021 38 2     MCV 10/08/2021 94     MCH 10/08/2021 29 9     MCHC 10/08/2021 31 7     RDW 10/08/2021 13 0     MPV 10/08/2021 12 6     Platelets 31/37/7550 228     nRBC 10/08/2021 0     Neutrophils Relative 10/08/2021 55     Immat GRANS % 10/08/2021 0     Lymphocytes Relative 10/08/2021 30     Monocytes Relative 10/08/2021 9     Eosinophils Relative 10/08/2021 5     Basophils Relative 10/08/2021 1     Neutrophils Absolute 10/08/2021 4 80     Immature Grans Absolute 10/08/2021 0 03     Lymphocytes Absolute 10/08/2021 2 60     Monocytes Absolute 10/08/2021 0 77     Eosinophils Absolute 10/08/2021 0 41     Basophils Absolute 10/08/2021 0 06     Sodium 10/08/2021 136     Potassium 10/08/2021 4 5     Chloride 10/08/2021 104     CO2 10/08/2021 26     ANION GAP 10/08/2021 6     BUN 10/08/2021 19     Creatinine 10/08/2021 1 08     Glucose, Fasting 10/08/2021 146*    Calcium 10/08/2021 9 6     Corrected Calcium 10/08/2021 10 1     AST 10/08/2021 20     ALT 10/08/2021 20     Alkaline Phosphatase 10/08/2021 63     Total Protein 10/08/2021 7 3     Albumin 10/08/2021 3 4*    Total Bilirubin 10/08/2021 0 44     eGFR 10/08/2021 49     Cholesterol 10/08/2021 221*    Triglycerides 10/08/2021 282*    HDL, Direct 10/08/2021 39*    LDL Calculated 10/08/2021 126*    Non-HDL-Chol (CHOL-HDL) 10/08/2021 182     Hemoglobin A1C 10/08/2021 7 9*    EAG 10/08/2021 180      Imaging: No results found  Review of Systems:  Review of Systems   Constitutional: Negative for diaphoresis, fatigue, fever and unexpected weight change  HENT: Negative  Respiratory: Negative for cough, shortness of breath and wheezing  Cardiovascular: Negative for chest pain, palpitations and leg swelling  Gastrointestinal: Negative for abdominal pain, diarrhea and nausea  Musculoskeletal: Negative for gait problem and myalgias  Skin: Negative for rash  Neurological: Negative for dizziness and numbness  Psychiatric/Behavioral: Negative  Physical Exam:  Physical Exam  Constitutional:       Appearance: She is well-developed  HENT:      Head: Normocephalic and atraumatic  Eyes:      Pupils: Pupils are equal, round, and reactive to light  Neck:      Vascular: No JVD  Cardiovascular:      Rate and Rhythm: Regular rhythm  Pulses: Normal pulses  Carotid pulses are 2+ on the right side and 2+ on the left side  Heart sounds: S1 normal and S2 normal    Pulmonary:      Effort: Pulmonary effort is normal       Breath sounds: Normal breath sounds  No wheezing or rales  Abdominal:      General: Bowel sounds are normal       Palpations: Abdomen is soft  Tenderness: There is no abdominal tenderness  Musculoskeletal:         General: No tenderness  Normal range of motion  Cervical back: Normal range of motion and neck supple  Skin:     General: Skin is warm  Neurological:      Mental Status: She is alert and oriented to person, place, and time  Cranial Nerves: No cranial nerve deficit  Deep Tendon Reflexes: Reflexes are normal and symmetric

## 2022-05-06 DIAGNOSIS — I10 HYPERTENSION, UNSPECIFIED TYPE: ICD-10-CM

## 2022-05-06 RX ORDER — LISINOPRIL 40 MG/1
40 TABLET ORAL DAILY
Qty: 90 TABLET | Refills: 3 | Status: SHIPPED | OUTPATIENT
Start: 2022-05-06 | End: 2023-05-06

## 2022-05-24 ENCOUNTER — RA CDI HCC (OUTPATIENT)
Dept: OTHER | Facility: HOSPITAL | Age: 82
End: 2022-05-24

## 2022-05-24 NOTE — PROGRESS NOTES
[id number] [id number]  [facility]  coding opportunities          Chart Reviewed number of suggestions sent to Provider: 2     Patients Insurance     Medicare Insurance: [employer]

## 2022-06-10 DIAGNOSIS — E11.9 TYPE 2 DIABETES MELLITUS WITHOUT COMPLICATION, UNSPECIFIED WHETHER LONG TERM INSULIN USE (HCC): ICD-10-CM

## 2022-06-10 RX ORDER — GLIMEPIRIDE 2 MG/1
2 TABLET ORAL
Qty: 30 TABLET | Refills: 5 | Status: SHIPPED | OUTPATIENT
Start: 2022-06-10

## 2022-06-14 ENCOUNTER — OFFICE VISIT (OUTPATIENT)
Dept: FAMILY MEDICINE CLINIC | Facility: CLINIC | Age: 82
End: 2022-06-14
Payer: COMMERCIAL

## 2022-06-14 VITALS
HEIGHT: 59 IN | HEART RATE: 80 BPM | WEIGHT: 217 LBS | SYSTOLIC BLOOD PRESSURE: 128 MMHG | TEMPERATURE: 97.9 F | DIASTOLIC BLOOD PRESSURE: 70 MMHG | BODY MASS INDEX: 43.75 KG/M2

## 2022-06-14 DIAGNOSIS — I25.10 CORONARY ARTERY DISEASE INVOLVING NATIVE CORONARY ARTERY OF NATIVE HEART WITHOUT ANGINA PECTORIS: ICD-10-CM

## 2022-06-14 DIAGNOSIS — I10 PRIMARY HYPERTENSION: ICD-10-CM

## 2022-06-14 DIAGNOSIS — H35.81 COTTON WOOL EXUDATES: ICD-10-CM

## 2022-06-14 DIAGNOSIS — E11.9 TYPE 2 DIABETES MELLITUS WITHOUT COMPLICATION, UNSPECIFIED WHETHER LONG TERM INSULIN USE (HCC): Primary | ICD-10-CM

## 2022-06-14 DIAGNOSIS — E78.2 MIXED HYPERLIPIDEMIA: ICD-10-CM

## 2022-06-14 DIAGNOSIS — Z78.0 ASYMPTOMATIC POSTMENOPAUSAL STATE: ICD-10-CM

## 2022-06-14 PROBLEM — H35.9 RETINAL LESION: Status: RESOLVED | Noted: 2021-03-07 | Resolved: 2022-06-14

## 2022-06-14 PROBLEM — M25.532 LEFT WRIST PAIN: Status: RESOLVED | Noted: 2021-12-02 | Resolved: 2022-06-14

## 2022-06-14 LAB — SL AMB POCT HEMOGLOBIN AIC: 8.8 (ref ?–6.5)

## 2022-06-14 PROCEDURE — 1160F RVW MEDS BY RX/DR IN RCRD: CPT | Performed by: FAMILY MEDICINE

## 2022-06-14 PROCEDURE — 3074F SYST BP LT 130 MM HG: CPT | Performed by: FAMILY MEDICINE

## 2022-06-14 PROCEDURE — 83036 HEMOGLOBIN GLYCOSYLATED A1C: CPT | Performed by: FAMILY MEDICINE

## 2022-06-14 PROCEDURE — 1036F TOBACCO NON-USER: CPT | Performed by: FAMILY MEDICINE

## 2022-06-14 PROCEDURE — 3078F DIAST BP <80 MM HG: CPT | Performed by: FAMILY MEDICINE

## 2022-06-14 PROCEDURE — 99214 OFFICE O/P EST MOD 30 MIN: CPT | Performed by: FAMILY MEDICINE

## 2022-06-14 NOTE — PATIENT INSTRUCTIONS
10% - bad control"> 10% - bad control,Hemoglobin A1c (HbA1c) greater than 10% indicating poor diabetic control,Haemoglobin A1c greater than 10% indicating poor diabetic control">   Diabetes Mellitus Type 2 in Adults, Ambulatory Care   GENERAL INFORMATION:   Diabetes mellitus type 2  is a disease that affects how your body uses glucose (sugar)  Insulin helps move sugar out of the blood so it can be used for energy  Normally, when the blood sugar level increases, the pancreas makes more insulin  Type 2 diabetes develops because either the body cannot make enough insulin, or it cannot use the insulin correctly  After many years, your pancreas may stop making insulin  Common symptoms include the following:   · More hunger or thirst than usual     · Frequent urination     · Weight loss without trying     · Blurred vision  Seek immediate care for the following symptoms:   · Severe abdominal pain, or pain that spreads to your back  You may also be vomiting  · Trouble staying awake or focusing    · Shaking or sweating    · Blurred or double vision    · Breath has a fruity, sweet smell    · Breathing is deep and labored, or rapid and shallow    · Heartbeat is fast and weak  Treatment for diabetes mellitus type 2  includes keeping your blood sugar at a normal level  You must eat the right foods, and exercise regularly  You may also need medicine if you cannot control your blood sugar level with nutrition and exercise  Manage diabetes mellitus type 2:   · Check your blood sugar level  You will be taught how to check a small drop of blood in a glucose monitor  Ask your healthcare provider when and how often to check during the day  Ask your healthcare provider what your blood sugar levels should be when you check them  · Keep track of carbohydrates (sugar and starchy foods)  Your blood sugar level can get too high if you eat too many carbohydrates   Your dietitian will help you plan meals and snacks that have the right amount of carbohydrates  · Eat low-fat foods  Some examples are skinless chicken and low-fat milk  · Eat less sodium (salt)  Some examples of high-sodium foods to limit are soy sauce, potato chips, and soup  Do not add salt to food you cook  Limit your use of table salt  · Eat high-fiber foods  Foods that are a good source of fiber include vegetables, whole grain bread, and beans  · Limit alcohol  Alcohol affects your blood sugar level and can make it harder to manage your diabetes  Women should limit alcohol to 1 drink a day  Men should limit alcohol to 2 drinks a day  A drink of alcohol is 12 ounces of beer, 5 ounces of wine, or 1½ ounces of liquor  · Get regular exercise  Exercise can help keep your blood sugar level steady, decrease your risk of heart disease, and help you lose weight  Exercise for at least 30 minutes, 5 days a week  Include muscle strengthening activities 2 days each week  Work with your healthcare provider to create an exercise plan  · Check your feet each day  for injuries or open sores  Ask your healthcare provider for activities you can do if you have an open sore  · Quit smoking  If you smoke, it is never too late to quit  Smoking can worsen the problems that may occur with diabetes  Ask your healthcare provider for information about how to stop smoking if you are having trouble quitting  · Ask about your weight:  Ask healthcare providers if you need to lose weight, and how much to lose  Ask them to help you with a weight loss program  Even a 10 to 15 pound weight loss can help you manage your blood sugar level  · Carry medical alert identification  Wear medical alert jewelry or carry a card that says you have diabetes  Ask your healthcare provider where to get these items  · Ask about vaccines  Diabetes puts you at risk of serious illness if you get the flu, pneumonia, or hepatitis   Ask your healthcare provider if you should get a flu, pneumonia, or hepatitis B vaccine, and when to get the vaccine  Follow up with your healthcare provider as directed:  Write down your questions so you remember to ask them during your visits  CARE AGREEMENT:   You have the right to help plan your care  Learn about your health condition and how it may be treated  Discuss treatment options with your caregivers to decide what care you want to receive  You always have the right to refuse treatment  The above information is an  only  It is not intended as medical advice for individual conditions or treatments  Talk to your doctor, nurse or pharmacist before following any medical regimen to see if it is safe and effective for you  © 2014 [address] is for End User's use only and may not be sold, redistributed or otherwise used for commercial purposes  All illustrations and images included in CareNotes® are the copyrighted property of A D A M , Inc  or Alvin Washington

## 2022-06-14 NOTE — ASSESSMENT & PLAN NOTE
Lab Results   Component Value Date    HGBA1C 8 8 (A) 06/14/2022   Poor control secondary to noncompliance  I reviewed with patient  I am concerned that increasing glimepiride would increase her risk for hypoglycemia  Patient does not want increase metformin because of risk for diarrhea  Will add Januvia 50 mg p o  I reviewed side effects with the patient    Recheck 3 months

## 2022-06-14 NOTE — ASSESSMENT & PLAN NOTE
The patient is asymptomatic  Continue present treatment  Follow up with Cardiology    Recheck 6 months

## 2022-06-14 NOTE — PROGRESS NOTES
Assessment/Plan:    Type 2 diabetes mellitus ([facility] )    Lab Results   Component Value Date    HGBA1C 8 8 (A) 06/14/2022   Poor control secondary to noncompliance  I reviewed with patient  I am concerned that increasing glimepiride would increase her risk for hypoglycemia  Patient does not want increase metformin because of risk for diarrhea  Will add Januvia 50 mg p o  I reviewed side effects with the patient  Recheck 3 months    Hypertension  Well controlled  Continue present treatment  Recheck 3 months    Coronary artery disease involving native coronary artery of native heart without angina pectoris  The patient is asymptomatic  Continue present treatment  Follow up with Cardiology  Recheck 6 months    Hyperlipidemia  Check labs  Urged diet control  Recheck 6 months    Cotton wool exudates  Up to date with Ophthalmology  Continue to monitor  Recheck 6 months       Diagnoses and all orders for this visit:    Type 2 diabetes mellitus without complication, unspecified whether long term insulin use (HCC)  -     POCT hemoglobin A1c  -     sitaGLIPtin (JANUVIA) 50 mg tablet; Take 1 tablet (50 mg total) by mouth daily  -     Comprehensive metabolic panel; Future  -     Lipid panel; Future  -     CBC and differential; Future    Coronary artery disease involving native coronary artery of native heart without angina pectoris    Primary hypertension  -     Comprehensive metabolic panel; Future  -     Lipid panel; Future  -     CBC and differential; Future    Mixed hyperlipidemia    Cotton wool exudates    Asymptomatic postmenopausal state  -     DXA bone density spine hip and pelvis; Future    Other orders  -     metFORMIN (GLUCOPHAGE) 500 mg tablet        Subjective:      Patient ID: Bijal Clark is a 80 y o  female  f/u multiple med issues   - pt has been doing well  - pt tries to watch diet but has been to several graduation parties, baptisms etc  A1C in office increased to 8 8 (from 7 9)   Does not check home BGs  Up to daet with eye exams  - pt does not exercise  Pt denies CP, palpitations, lightheadedness or other CV symptoms with or without exertion  - no new  or GI complaints  - no new extremity complaints  Still with back issues  Walks with cane  0 up to date with ophth re: Cotton Wool spots  Has appt next month  No new visual changes      The following portions of the patient's history were reviewed and updated as appropriate:   She  has a past medical history of Bereavement, uncomplicated, Coronary artery disease, Diabetes mellitus ([facility] [place] [facility] ), Hyperlipidemia, Hypertension, Lymphedema, Morbid obesity ([place] [address] ), and Myocardial infarction ([place] [address] )  She   Patient Active Problem List    Diagnosis Date Noted    Vaccination not carried out because of patient refusal 12/02/2021    Cotton wool exudates 06/15/2021    Coronary artery disease involving native coronary artery of native heart without angina pectoris 03/18/2020    Chronic right-sided low back pain with right-sided sciatica 01/17/2020    Morbid obesity, unspecified obesity type ([place] [address] ) 10/05/2016    Type 2 diabetes mellitus ([place] [address] ) 10/26/2015    Hyperlipidemia 12/06/2013    Allergic rhinitis 09/27/2013    Hypertension 07/31/2012    Esophageal reflux 07/31/2012     She  has a past surgical history that includes Cardiac catheterization; Hip surgery; and Tubal ligation  She  reports that she has never smoked  She has never used smokeless tobacco  She reports that she does not drink alcohol and does not use drugs    Current Outpatient Medications   Medication Sig Dispense Refill    amLODIPine (NORVASC) 5 mg tablet Take 1 tablet (5 mg total) by mouth daily 90 tablet 3    Ascorbic Acid (VITAMIN C) 500 MG CAPS Take 1 tablet by mouth daily      aspirin 81 MG tablet Take 1 tablet by mouth as needed Take 1 tablet weekly       glimepiride (AMARYL) 2 mg tablet Take 1 tablet (2 mg total) by mouth daily with breakfast 30 tablet 5    hydrochlorothiazide (HYDRODIURIL) 25 mg tablet Take 1 tablet (25 mg total) by mouth daily 90 tablet 4    lisinopril (ZESTRIL) 40 mg tablet Take 1 tablet (40 mg total) by mouth daily 90 tablet 3    metFORMIN (GLUCOPHAGE) 1000 MG tablet Take 1 tablet (1,000 mg total) by mouth 2 (two) times a day with meals (Patient taking differently: Take 500 mg by mouth daily) 180 tablet 1    metFORMIN (GLUCOPHAGE) 500 mg tablet       metoprolol succinate (TOPROL-XL) 100 mg 24 hr tablet Take 1 tablet (100 mg total) by mouth daily 90 tablet 4    sitaGLIPtin (JANUVIA) 50 mg tablet Take 1 tablet (50 mg total) by mouth daily 30 tablet 5    terconazole (TERAZOL 7) 0 4 % vaginal cream Insert 1 applicator into the vagina daily at bedtime 45 g 1     No current facility-administered medications for this visit  She is allergic to jardiance [empagliflozin], prandin [repaglinide], and sulfa antibiotics       Review of Systems   Constitutional: Negative  HENT: Negative  Eyes: Negative  Respiratory: Negative  Cardiovascular: Negative  Gastrointestinal: Negative  Endocrine: Negative  Genitourinary: Negative  Musculoskeletal: Positive for arthralgias, back pain and gait problem (Ambulates with a cane)  Negative for myalgias  Skin: Negative  Allergic/Immunologic: Negative  Neurological: Negative for dizziness, weakness, light-headedness and numbness  Hematological: Negative  Psychiatric/Behavioral: Negative  Objective:      /70   Pulse 80   Temp 97 9 °F (36 6 °C)   Ht 4' 11" (1 499 m)   Wt 98 4 kg (217 lb)   BMI 43 83 kg/m²          Physical Exam  Vitals reviewed  Constitutional:       Appearance: She is well-developed  She is not diaphoretic  HENT:      Head: Normocephalic and atraumatic        Right Ear: Tympanic membrane, ear canal and external ear normal       Left Ear: Tympanic membrane, ear canal and external ear normal       Mouth/Throat:      Mouth: Mucous membranes are moist    Eyes: Extraocular Movements: Extraocular movements intact  Conjunctiva/sclera: Conjunctivae normal       Pupils: Pupils are equal, round, and reactive to light  Neck:      Thyroid: No thyromegaly  Vascular: No JVD  Cardiovascular:      Rate and Rhythm: Normal rate and regular rhythm  Pulses:           Dorsalis pedis pulses are 1+ on the right side and 1+ on the left side  Heart sounds: No murmur heard  Pulmonary:      Effort: Pulmonary effort is normal       Breath sounds: Normal breath sounds  Abdominal:      General: There is no distension  Palpations: Abdomen is soft  There is no mass  Tenderness: There is no abdominal tenderness  Musculoskeletal:         General: No swelling or deformity  Normal range of motion  Cervical back: Normal range of motion and neck supple  No tenderness  No muscular tenderness  Right lower leg: Edema (trace) present  Left lower leg: Edema (trace) present  Feet:      Right foot:      Skin integrity: No ulcer, skin breakdown, erythema, warmth, callus or dry skin  Left foot:      Skin integrity: No ulcer, skin breakdown, erythema, warmth, callus or dry skin  Lymphadenopathy:      Cervical: No cervical adenopathy  Skin:     General: Skin is warm and dry  Capillary Refill: Capillary refill takes less than 2 seconds  Neurological:      Mental Status: She is alert and oriented to person, place, and time  Cranial Nerves: No cranial nerve deficit  Sensory: No sensory deficit  Motor: No weakness or abnormal muscle tone  Gait: Gait abnormal (ambulates with cane)  Deep Tendon Reflexes: Reflexes are normal and symmetric        Comments: minicog 5/5   Psychiatric:         Mood and Affect: Mood normal

## 2022-06-15 DIAGNOSIS — E11.9 TYPE 2 DIABETES MELLITUS WITHOUT COMPLICATION, UNSPECIFIED WHETHER LONG TERM INSULIN USE (HCC): Primary | ICD-10-CM

## 2022-06-15 NOTE — TELEPHONE ENCOUNTER
1 [facility]    Recommendation: Discussed medication adherence with patient and methods to improve adherence  The following medications refills are pended Metformin 500mg, 90 day supply    Reason for documentation: Patient was flagged by Third Party Payer and/or internal report as being due for a refill on the following medications:   6/13 - Metformin last filled on 5/18 for 30-day supply with no refills remaining  Member can save and get as a 90-day supply for 3 95 (currently pays 3 17 for 30-day supply)  Offered metformin XR, however patient was currently not interested at this time, prefers 500mg IR tablets      Adherence Assessment:     Misses doses:  no    # of missed doses in the past week: 0    # of times per day patient takes meds: 1    Use of supportive adherence tools:No    Med cost concerns: no    PCP: Please sign pended rxs

## 2022-06-15 NOTE — PROGRESS NOTES
Note was written by Zelda Mcgowan, pharmacy Resident  Tariq Santana, Pharmacist was not present during the patient appointment and was available as needed  Jeovany Whatley, Pharmacist  have reviewed and discussed the case with the resident and agree with the findings and plan as documented      Pharmacist Tracking Tool    Reason For [facility] Pharmacist    Demographics  Interaction Method: Phone  Type of Intervention: New    Topic(s) Addressed  Quality measures    Intervention(s) Made      Non-Pharmacologic:    -- Adherence addressed    Time Spent:   Time Spent in Direct Patient Care: 10 minutes  Time Spent in Care Coordination: 5 minutes    Recommendations  Recipient: Patient/caregiver  Outcome: Education Provided

## 2022-07-18 DIAGNOSIS — I10 ESSENTIAL HYPERTENSION: ICD-10-CM

## 2022-07-18 DIAGNOSIS — I10 HYPERTENSION, UNSPECIFIED TYPE: ICD-10-CM

## 2022-07-18 RX ORDER — AMLODIPINE BESYLATE 5 MG/1
5 TABLET ORAL DAILY
Qty: 90 TABLET | Refills: 3 | Status: SHIPPED | OUTPATIENT
Start: 2022-07-18

## 2022-07-18 RX ORDER — HYDROCHLOROTHIAZIDE 25 MG/1
25 TABLET ORAL DAILY
Qty: 90 TABLET | Refills: 4 | Status: SHIPPED | OUTPATIENT
Start: 2022-07-18

## 2022-09-09 ENCOUNTER — APPOINTMENT (OUTPATIENT)
Dept: LAB | Facility: CLINIC | Age: 82
End: 2022-09-09
Payer: COMMERCIAL

## 2022-09-09 DIAGNOSIS — E11.9 TYPE 2 DIABETES MELLITUS WITHOUT COMPLICATION, UNSPECIFIED WHETHER LONG TERM INSULIN USE (HCC): ICD-10-CM

## 2022-09-09 DIAGNOSIS — I10 PRIMARY HYPERTENSION: ICD-10-CM

## 2022-09-09 LAB
ALBUMIN SERPL BCP-MCNC: 3.3 G/DL (ref 3.5–5)
ALP SERPL-CCNC: 65 U/L (ref 46–116)
ALT SERPL W P-5'-P-CCNC: 18 U/L (ref 12–78)
ANION GAP SERPL CALCULATED.3IONS-SCNC: 6 MMOL/L (ref 4–13)
AST SERPL W P-5'-P-CCNC: 17 U/L (ref 5–45)
BASOPHILS # BLD AUTO: 0.05 THOUSANDS/ΜL (ref 0–0.1)
BASOPHILS NFR BLD AUTO: 1 % (ref 0–1)
BILIRUB SERPL-MCNC: 0.43 MG/DL (ref 0.2–1)
BUN SERPL-MCNC: 18 MG/DL (ref 5–25)
CALCIUM ALBUM COR SERPL-MCNC: 9.8 MG/DL (ref 8.3–10.1)
CALCIUM SERPL-MCNC: 9.2 MG/DL (ref 8.3–10.1)
CHLORIDE SERPL-SCNC: 105 MMOL/L (ref 96–108)
CHOLEST SERPL-MCNC: 224 MG/DL
CO2 SERPL-SCNC: 26 MMOL/L (ref 21–32)
CREAT SERPL-MCNC: 1.04 MG/DL (ref 0.6–1.3)
EOSINOPHIL # BLD AUTO: 0.7 THOUSAND/ΜL (ref 0–0.61)
EOSINOPHIL NFR BLD AUTO: 8 % (ref 0–6)
ERYTHROCYTE [DISTWIDTH] IN BLOOD BY AUTOMATED COUNT: 12.7 % (ref 11.6–15.1)
GFR SERPL CREATININE-BSD FRML MDRD: 51 ML/MIN/1.73SQ M
GLUCOSE P FAST SERPL-MCNC: 170 MG/DL (ref 65–99)
HCT VFR BLD AUTO: 37.6 % (ref 34.8–46.1)
HDLC SERPL-MCNC: 35 MG/DL
HGB BLD-MCNC: 11.9 G/DL (ref 11.5–15.4)
IMM GRANULOCYTES # BLD AUTO: 0.03 THOUSAND/UL (ref 0–0.2)
IMM GRANULOCYTES NFR BLD AUTO: 0 % (ref 0–2)
LDLC SERPL CALC-MCNC: 119 MG/DL (ref 0–100)
LYMPHOCYTES # BLD AUTO: 2.53 THOUSANDS/ΜL (ref 0.6–4.47)
LYMPHOCYTES NFR BLD AUTO: 29 % (ref 14–44)
MCH RBC QN AUTO: 29.9 PG (ref 26.8–34.3)
MCHC RBC AUTO-ENTMCNC: 31.6 G/DL (ref 31.4–37.4)
MCV RBC AUTO: 95 FL (ref 82–98)
MONOCYTES # BLD AUTO: 0.63 THOUSAND/ΜL (ref 0.17–1.22)
MONOCYTES NFR BLD AUTO: 7 % (ref 4–12)
NEUTROPHILS # BLD AUTO: 4.75 THOUSANDS/ΜL (ref 1.85–7.62)
NEUTS SEG NFR BLD AUTO: 55 % (ref 43–75)
NONHDLC SERPL-MCNC: 189 MG/DL
NRBC BLD AUTO-RTO: 0 /100 WBCS
PLATELET # BLD AUTO: 209 THOUSANDS/UL (ref 149–390)
PMV BLD AUTO: 13 FL (ref 8.9–12.7)
POTASSIUM SERPL-SCNC: 4.2 MMOL/L (ref 3.5–5.3)
PROT SERPL-MCNC: 7.4 G/DL (ref 6.4–8.4)
RBC # BLD AUTO: 3.98 MILLION/UL (ref 3.81–5.12)
SODIUM SERPL-SCNC: 137 MMOL/L (ref 135–147)
TRIGL SERPL-MCNC: 350 MG/DL
WBC # BLD AUTO: 8.69 THOUSAND/UL (ref 4.31–10.16)

## 2022-09-09 PROCEDURE — 80061 LIPID PANEL: CPT

## 2022-09-09 PROCEDURE — 36415 COLL VENOUS BLD VENIPUNCTURE: CPT

## 2022-09-09 PROCEDURE — 80053 COMPREHEN METABOLIC PANEL: CPT

## 2022-09-09 PROCEDURE — 85025 COMPLETE CBC W/AUTO DIFF WBC: CPT

## 2022-09-15 ENCOUNTER — VBI (OUTPATIENT)
Dept: ADMINISTRATIVE | Facility: OTHER | Age: 82
End: 2022-09-15

## 2022-09-29 ENCOUNTER — RA CDI HCC (OUTPATIENT)
Dept: OTHER | Facility: HOSPITAL | Age: 82
End: 2022-09-29

## 2022-09-30 NOTE — PROGRESS NOTES
E11 [id number], [id number]  [facility]  coding opportunities          Chart Reviewed number of suggestions sent to Provider: 2     Patients Insurance     Medicare Insurance: [employer]

## 2022-10-06 ENCOUNTER — TELEPHONE (OUTPATIENT)
Dept: FAMILY MEDICINE CLINIC | Facility: CLINIC | Age: 82
End: 2022-10-06

## 2022-10-06 NOTE — TELEPHONE ENCOUNTER
Patient came in today because she thought she had an appointment with you for an AWV    Her appointment was actually tomorrow for a 3 Month FU    She stated that she couldn't make the appointment tomorrow because she has a conflicting appointment with a foot doctor so we had to cancel it     She would like to reschedule and she would like it to be for an AWV    According to what I see in her chart, she has not had one since September 2020    You are currently booked out for AWV's until the Spring   Please advise if and when you would like to have her come in sooner for one

## 2022-10-26 ENCOUNTER — OFFICE VISIT (OUTPATIENT)
Dept: FAMILY MEDICINE CLINIC | Facility: CLINIC | Age: 82
End: 2022-10-26

## 2022-10-26 VITALS
HEIGHT: 59 IN | WEIGHT: 213 LBS | BODY MASS INDEX: 42.94 KG/M2 | DIASTOLIC BLOOD PRESSURE: 64 MMHG | SYSTOLIC BLOOD PRESSURE: 136 MMHG | HEART RATE: 117 BPM | OXYGEN SATURATION: 97 % | RESPIRATION RATE: 16 BRPM | TEMPERATURE: 99.4 F

## 2022-10-26 DIAGNOSIS — I25.10 CORONARY ARTERY DISEASE INVOLVING NATIVE CORONARY ARTERY OF NATIVE HEART WITHOUT ANGINA PECTORIS: ICD-10-CM

## 2022-10-26 DIAGNOSIS — E11.9 TYPE 2 DIABETES MELLITUS WITHOUT COMPLICATION, UNSPECIFIED WHETHER LONG TERM INSULIN USE (HCC): Primary | ICD-10-CM

## 2022-10-26 DIAGNOSIS — E78.2 MIXED HYPERLIPIDEMIA: ICD-10-CM

## 2022-10-26 DIAGNOSIS — I10 PRIMARY HYPERTENSION: ICD-10-CM

## 2022-10-26 LAB — SL AMB POCT HEMOGLOBIN AIC: 9 (ref ?–6.5)

## 2022-10-26 NOTE — PROGRESS NOTES
Name: Drashan Moore      : 1940      MRN: [id number]  Encounter Provider: Mitesh Lizarraga MD  Encounter Date: 10/26/2022   Encounter department: [address] [place] [address]     1  Type 2 diabetes mellitus without complication, unspecified whether long term insulin use (HCC)  Assessment & Plan:    Lab Results   Component Value Date    HGBA1C 9 0 (A) 10/26/2022   Worsening control  I reviewed with patient  She absolutely refuses to change or adjust any medications  I am concerned that increasing glimepiride would potentially put her at risk for hypoglycemic episodes  Patient never started Januvia because of “side effects” she read about  Patient states that she will work on her diet  She understands risks she is taking  I will see her back in 3 months  Recheck A1c at that time    Orders:  -     POCT hemoglobin A1c    2  Coronary artery disease involving native coronary artery of native heart without angina pectoris  Assessment & Plan:  Patient without anginal symptoms  Continue present medications  Follow-up with Cardiology  Recheck 6 months      3  Primary hypertension  Assessment & Plan:  Blood pressure not quite at goal but acceptable  Continue present medications  Urged diet control and weight loss  Recheck 6 months      4  Mixed hyperlipidemia  Assessment & Plan:  Check lipids  Continue to monitor  Recheck 6 months             Subjective     f/u multiple med issues   - pt hurt her R lateral hip in Sept trying to get into her son's [place]  Pain persists-worsens with getting up and trying to walk on it  If she is not careful, patient will sometimes get a severe sharp pain that radiates down her leg into her right 1st toe (L4 distribution)  Patient has been having some chronic right lower back pain  - pt thinks that she has been doing better with her diet since last visit  Last A1c at that visit was 8 8    Today's A1c= 9 0  - pt denies CP, palpitations, lightheadedness or other CV symptoms with or without exertion  - pt denies any new  or GI complaints    Review of Systems   Constitutional: Negative  HENT: Negative  Eyes: Negative  Respiratory: Negative  Cardiovascular: Negative  Gastrointestinal: Negative  Endocrine: Negative  Genitourinary: Negative  Musculoskeletal: Positive for arthralgias, back pain and gait problem (Ambulates with a cane)  Negative for myalgias  Skin: Negative  Allergic/Immunologic: Negative  Neurological: Negative for dizziness, weakness, light-headedness and numbness  Hematological: Negative  Psychiatric/Behavioral: Negative          Past Medical History:   Diagnosis Date   • Bereavement, uncomplicated     [id number]  RESOLVED   • Coronary artery disease    • Diabetes mellitus ([facility] )    • Hyperlipidemia    • Hypertension    • Lymphedema    • Morbid obesity ([facility] )    • Myocardial infarction [facility])      Past Surgical History:   Procedure Laterality Date   • CARDIAC CATHETERIZATION     • HIP SURGERY     • TUBAL LIGATION       Family History   Problem Relation Age of Onset   • Heart disease Mother    • Diabetes type II Mother    • Hypertension Mother    • Heart disease Sister    • Diabetes type II Sister    • Diabetes type II Brother    • Diabetes type II Family      Social History     Socioeconomic History   • Marital status:      Spouse name: None   • Number of children: None   • Years of education: None   • Highest education level: None   Occupational History   • Occupation: RETIRED   Tobacco Use   • Smoking status: Never Smoker   • Smokeless tobacco: Never Used   Substance and Sexual Activity   • Alcohol use: No   • Drug use: Never   • Sexual activity: Not Currently   Other Topics Concern   • None   Social History Narrative    [employer]     Social Determinants of Health     Financial Resource Strain: Not on file   Food Insecurity: Not on file   Transportation Needs: Not on file   Physical Activity: Not on file   Stress: Not on file   Social Connections: Not on file   Intimate Partner Violence: Not on file   Housing Stability: Not on file     Current Outpatient Medications on File Prior to Visit   Medication Sig   • amLODIPine (NORVASC) 5 mg tablet Take 1 tablet (5 mg total) by mouth daily   • Ascorbic Acid (VITAMIN C) 500 MG CAPS Take 1 tablet by mouth daily   • aspirin 81 MG tablet Take 1 tablet by mouth as needed Take 1 tablet weekly    • glimepiride (AMARYL) 2 mg tablet Take 1 tablet (2 mg total) by mouth daily with breakfast   • hydrochlorothiazide (HYDRODIURIL) 25 mg tablet Take 1 tablet (25 mg total) by mouth daily   • lisinopril (ZESTRIL) 40 mg tablet Take 1 tablet (40 mg total) by mouth daily   • metFORMIN (GLUCOPHAGE) 500 mg tablet Take 2 tablets (1,000 mg total) by mouth daily   • metoprolol succinate (TOPROL-XL) 100 mg 24 hr tablet Take 1 tablet (100 mg total) by mouth daily   • terconazole (TERAZOL 7) 0 4 % vaginal cream Insert 1 applicator into the vagina daily at bedtime (Patient not taking: Reported on 10/26/2022)     Allergies   Allergen Reactions   • Jardiance [Empagliflozin]    • Prandin [Repaglinide] Edema   • Sulfa Antibiotics Other (See Comments)     Immunization History   Administered Date(s) Administered   • Tdap 06/04/2008, 01/19/2015       Objective     /64 (BP Location: Left arm)   Pulse (!) 117   Temp 99 4 °F (37 4 °C)   Resp 16   Ht 4' 11" (1 499 m)   Wt 96 6 kg (213 lb)   SpO2 97%   Breastfeeding No   BMI 43 02 kg/m²     Physical Exam  Vitals reviewed  Constitutional:       Appearance: She is well-developed  She is not diaphoretic  HENT:      Head: Normocephalic and atraumatic  Right Ear: Tympanic membrane, ear canal and external ear normal       Left Ear: Tympanic membrane, ear canal and external ear normal       Mouth/Throat:      Mouth: Mucous membranes are moist    Eyes:      Extraocular Movements: Extraocular movements intact  Conjunctiva/sclera: Conjunctivae normal       Pupils: Pupils are equal, round, and reactive to light  Neck:      Thyroid: No thyromegaly  Vascular: No JVD  Cardiovascular:      Rate and Rhythm: Normal rate and regular rhythm  Pulses: Normal pulses  Heart sounds: No murmur heard  Pulmonary:      Effort: Pulmonary effort is normal       Breath sounds: Normal breath sounds  Abdominal:      General: There is no distension  Palpations: Abdomen is soft  There is no mass  Tenderness: There is no abdominal tenderness  Musculoskeletal:         General: Tenderness (over R SI joint  Increased pain with abduction of R hip joint) present  No swelling  Normal range of motion  Cervical back: Normal range of motion and neck supple  No muscular tenderness  Right lower leg: No edema  Left lower leg: No edema  Lymphadenopathy:      Cervical: No cervical adenopathy  Skin:     General: Skin is warm and dry  Capillary Refill: Capillary refill takes less than 2 seconds  Neurological:      Mental Status: She is alert and oriented to person, place, and time  Cranial Nerves: No cranial nerve deficit  Sensory: No sensory deficit  Motor: No weakness or abnormal muscle tone  Gait: Gait normal       Deep Tendon Reflexes: Reflexes are normal and symmetric  Reflexes normal    Psychiatric:         Mood and Affect: Mood normal          Behavior: Behavior normal          Thought Content:  Thought content normal          Judgment: Judgment normal        Trevin Abdi MD

## 2022-10-28 NOTE — ASSESSMENT & PLAN NOTE
Patient without anginal symptoms  Continue present medications  Follow-up with Cardiology    Recheck 6 months

## 2022-10-28 NOTE — ASSESSMENT & PLAN NOTE
Blood pressure not quite at goal but acceptable  Continue present medications  Urged diet control and weight loss    Recheck 6 months

## 2022-10-28 NOTE — ASSESSMENT & PLAN NOTE
Lab Results   Component Value Date    HGBA1C 9 0 (A) 10/26/2022   Worsening control  I reviewed with patient  She absolutely refuses to change or adjust any medications  I am concerned that increasing glimepiride would potentially put her at risk for hypoglycemic episodes  Patient never started Januvia because of “side effects” she read about  Patient states that she will work on her diet  She understands risks she is taking  I will see her back in 3 months    Recheck A1c at that time

## 2022-11-15 LAB
LEFT EYE DIABETIC RETINOPATHY: POSITIVE
RIGHT EYE DIABETIC RETINOPATHY: POSITIVE

## 2022-11-28 DIAGNOSIS — I48.91 ATRIAL FIBRILLATION, UNSPECIFIED TYPE (HCC): ICD-10-CM

## 2022-11-29 RX ORDER — METOPROLOL SUCCINATE 100 MG/1
100 TABLET, EXTENDED RELEASE ORAL DAILY
Qty: 90 TABLET | Refills: 4 | Status: SHIPPED | OUTPATIENT
Start: 2022-11-29

## 2022-12-21 DIAGNOSIS — E11.9 TYPE 2 DIABETES MELLITUS WITHOUT COMPLICATION, UNSPECIFIED WHETHER LONG TERM INSULIN USE (HCC): ICD-10-CM

## 2022-12-21 RX ORDER — GLIMEPIRIDE 2 MG/1
2 TABLET ORAL
Qty: 30 TABLET | Refills: 5 | Status: SHIPPED | OUTPATIENT
Start: 2022-12-21

## 2023-02-01 ENCOUNTER — OFFICE VISIT (OUTPATIENT)
Dept: FAMILY MEDICINE CLINIC | Facility: CLINIC | Age: 83
End: 2023-02-01

## 2023-02-01 VITALS
SYSTOLIC BLOOD PRESSURE: 124 MMHG | DIASTOLIC BLOOD PRESSURE: 64 MMHG | BODY MASS INDEX: 42.94 KG/M2 | HEART RATE: 98 BPM | TEMPERATURE: 97.8 F | HEIGHT: 59 IN | WEIGHT: 213 LBS | RESPIRATION RATE: 18 BRPM

## 2023-02-01 DIAGNOSIS — I25.10 CORONARY ARTERY DISEASE INVOLVING NATIVE CORONARY ARTERY OF NATIVE HEART WITHOUT ANGINA PECTORIS: ICD-10-CM

## 2023-02-01 DIAGNOSIS — E11.9 TYPE 2 DIABETES MELLITUS WITHOUT COMPLICATION, UNSPECIFIED WHETHER LONG TERM INSULIN USE (HCC): ICD-10-CM

## 2023-02-01 DIAGNOSIS — I10 PRIMARY HYPERTENSION: ICD-10-CM

## 2023-02-01 DIAGNOSIS — E78.2 MIXED HYPERLIPIDEMIA: ICD-10-CM

## 2023-02-01 DIAGNOSIS — Z00.00 MEDICARE ANNUAL WELLNESS VISIT, SUBSEQUENT: Primary | ICD-10-CM

## 2023-02-01 LAB — SL AMB POCT HEMOGLOBIN AIC: 9 (ref ?–6.5)

## 2023-02-01 RX ORDER — GLIMEPIRIDE 4 MG/1
4 TABLET ORAL
Qty: 90 TABLET | Refills: 1 | Status: SHIPPED | OUTPATIENT
Start: 2023-02-01

## 2023-02-01 NOTE — PROGRESS NOTES
Assessment and Plan:     Problem List Items Addressed This Visit        Endocrine    Type 2 diabetes mellitus ([facility] [address] )       Lab Results   Component Value Date    HGBA1C 9 0 (A) 02/01/2023   Poor control  Urged diet control  Pt states that she does not want to start any injectable meds and cannot afford many other meds  Will Increase metformin to 1000mg bid and glimepiride to 4mg qd  I discussed potential risks for hypoglycemia  Recheck 3m         Relevant Medications    metFORMIN (GLUCOPHAGE) 1000 MG tablet    glimepiride (AMARYL) 4 mg tablet    Other Relevant Orders    POCT hemoglobin A1c (Completed)    Lipid panel    Microalbumin / creatinine urine ratio    Comprehensive metabolic panel    CBC and differential       Cardiovascular and Mediastinum    Coronary artery disease involving native coronary artery of native heart without angina pectoris     Pt stable on present meds  F/u with Cardio  Recheck 3m         Relevant Orders    CBC and differential    Hypertension     Well controlled  Cont present treatment  Monitor labs  Recheck 6m              Other    Hyperlipidemia     I reviewed with pt  Continue present care  Check labs  Recheck 3m - earlier if not at goal        Other Visit Diagnoses     Medicare annual wellness visit, subsequent    -  Primary           Preventive health issues were discussed with patient, and age appropriate screening tests were ordered as noted in patient's After Visit Summary  Personalized health advice and appropriate referrals for health education or preventive services given if needed, as noted in patient's After Visit Summary  History of Present Illness:     Patient presents for a Medicare Wellness Visit    f/u multiple med issues and AWV  - sl increased stress (sister has been in and out of the hospital)  PT denies worsening depression or anxiety but does feel "stressed"  - pt states that she is not always compliant with diet  She also can miss her second dose of metformin  A1C in office today is still elevated (9 0)  She denies changes in vision or increased thirst/urination  - pt states that her back is unchanged  Still has good days and bad  Exacerbations tend to last a couple of days and improve with conservative measures  No change in bowel/bladder, worsening leg weakness or other symptoms noted  - pt is not very active  She denies CP, palpitations, lightheadedness or other CV symptoms with or without exertion  - AWV done     Patient Care Team:  Stanford Burns MD as PCP - MD Naty Valverde MD Jaclyn Leatherwood, MD     Review of Systems:     Review of Systems   Constitutional: Negative  HENT: Negative  Eyes: Negative  Respiratory: Negative  Cardiovascular: Negative  Gastrointestinal: Negative  Endocrine: Negative  Genitourinary: Negative  Musculoskeletal: Positive for arthralgias, back pain (intermittent) and gait problem (Ambulates with a cane)  Negative for myalgias  Skin: Negative  Allergic/Immunologic: Negative  Neurological: Negative for dizziness, weakness, light-headedness, numbness and headaches  Hematological: Negative  Psychiatric/Behavioral: Negative           Problem List:     Patient Active Problem List   Diagnosis   • Hypertension   • Hyperlipidemia   • Morbid obesity, unspecified obesity type ([facility] )   • Allergic rhinitis   • Esophageal reflux   • Type 2 diabetes mellitus (HCC)   • Chronic right-sided low back pain with right-sided sciatica   • Coronary artery disease involving native coronary artery of native heart without angina pectoris   • Cotton wool exudates   • Vaccination not carried out because of patient refusal      Past Medical and Surgical History:     Past Medical History:   Diagnosis Date   • Bereavement, uncomplicated     [id number]  RESOLVED   • Coronary artery disease    • Diabetes mellitus ([facility] [address] )    • Hyperlipidemia    • Hypertension    • Lymphedema    • Morbid obesity [facility])    • Myocardial infarction [facility])      Past Surgical History:   Procedure Laterality Date   • CARDIAC CATHETERIZATION     • HIP SURGERY     • TUBAL LIGATION        Family History:     Family History   Problem Relation Age of Onset   • Heart disease Mother    • Diabetes type II Mother    • Hypertension Mother    • Heart disease Sister    • Diabetes type II Sister    • Diabetes type II Brother    • Diabetes type II Family       Social History:     Social History     Socioeconomic History   • Marital status:      Spouse name: None   • Number of children: None   • Years of education: None   • Highest education level: None   Occupational History   • Occupation: RETIRED   Tobacco Use   • Smoking status: Never   • Smokeless tobacco: Never   Substance and Sexual Activity   • Alcohol use: No   • Drug use: Never   • Sexual activity: Not Currently   Other Topics Concern   • None   Social History Narrative    DAILY TE[employer] CONSU[employer]ION     Social Determinants of Health     Financial Resource Strain: Low Risk    • Difficulty of Paying Living Expenses: Not very hard   Food Insecurity: Not on file   Transportation Needs: No Transportation Needs   • Lack of Transportation (Medical): No   • Lack of Transportation (Non-Medical):  No   Physical Activity: Not on file   Stress: Not on file   Social Connections: Not on file   Intimate Partner Violence: Not on file   Housing Stability: Not on file      Medications and Allergies:     Current Outpatient Medications   Medication Sig Dispense Refill   • Ascorbic Acid (VITAMIN C) 500 MG CAPS Take 1 tablet by mouth daily     • aspirin 81 MG tablet Take 1 tablet by mouth as needed Take 1 tablet weekly      • glimepiride (AMARYL) 4 mg tablet Take 1 tablet (4 mg total) by mouth daily with breakfast 90 tablet 1   • hydrochlorothiazide (HYDRODIURIL) 25 mg tablet Take 1 tablet (25 mg total) by mouth daily 90 tablet 4   • hydrochlorothiazide (HYDRODIURIL) 25 mg tablet Take 1 tablet (25 mg total) by mouth daily 90 tablet 4   • lisinopril (ZESTRIL) 40 mg tablet Take 1 tablet (40 mg total) by mouth daily 90 tablet 3   • metFORMIN (GLUCOPHAGE) 1000 MG tablet Take 1 tablet (1,000 mg total) by mouth 2 (two) times a day with meals 180 tablet 1   • metoprolol succinate (TOPROL-XL) 100 mg 24 hr tablet Take 1 tablet (100 mg total) by mouth daily 90 tablet 4   • Ascorbic Acid (VITAMIN C) 500 MG CAPS Take 1 tablet by mouth daily     • terconazole (TERAZOL 7) 0 4 % vaginal cream Insert 1 applicator into the vagina daily at bedtime (Patient not taking: Reported on 10/26/2022) 45 g 1     No current facility-administered medications for this visit  Allergies   Allergen Reactions   • Jardiance [Empagliflozin]    • Prandin [Repaglinide] Edema   • Sulfa Antibiotics Other (See Comments)      Immunizations:     Immunization History   Administered Date(s) Administered   • Tdap 06/04/2008, 01/19/2015      Health Maintenance:         Topic Date Due   • DXA SCAN  Never done   • Hepatitis C Screening  Completed         Topic Date Due   • COVID-19 Vaccine (1) Never done      Medicare Screening Tests and Risk Assessments:     Yuko Perkins is here for her Subsequent Wellness visit  Last Medicare Wellness visit information reviewed, patient interviewed and updates made to the record today  Health Risk Assessment:   Patient rates overall health as very good  Patient feels that their physical health rating is same  Patient is very satisfied with their life  Eyesight was rated as same  Hearing was rated as same  Patient feels that their emotional and mental health rating is same  Patients states they are never, rarely angry  Patient states they are never, rarely unusually tired/fatigued  Pain experienced in the last 7 days has been some  Patient's pain rating has been 8/10  Patient states that she has experienced no weight loss or gain in last 6 months  Intermittent - low back   8/10 at worst, though pain frequently less    Depression Screening:   PHQ-2 Score: 1      Fall Risk Screening: In the past year, patient has experienced: no history of falling in past year      Urinary Incontinence Screening:   Patient has not leaked urine accidently in the last six months  Home Safety:  Patient has trouble with stairs inside or outside of their home  Patient has working smoke alarms and has no working carbon monoxide detector  Home safety hazards include: none  Nutrition:   Current diet is Regular and Diabetic  Medications:   Patient is currently taking over-the-counter supplements  OTC medications include: see medication list  Patient is able to manage medications  Activities of Daily Living (ADLs)/Instrumental Activities of Daily Living (IADLs):   Walk and transfer into and out of bed and chair?: Yes  Dress and groom yourself?: Yes    Bathe or shower yourself?: Yes    Feed yourself?  Yes  Do your laundry/housekeeping?: Yes  Manage your money, pay your bills and track your expenses?: Yes  Make your own meals?: Yes    Do your own shopping?: Yes    Previous Hospitalizations:   Any hospitalizations or ED visits within the last 12 months?: No      Advance Care Planning:   Living will: No    Durable POA for healthcare: No    Advanced directive: No    Advanced directive counseling given: No    Five wishes given: Yes    End of Life Decisions reviewed with patient: Yes      Comments: Shad Moreland 5 wishes form at home    Cognitive Screening:   Provider or family/friend/caregiver concerned regarding cognition?: No    PREVENTIVE SCREENINGS      Cardiovascular Screening:    General: Screening Not Indicated and History Lipid Disorder      Diabetes Screening:     General: Screening Not Indicated and History Diabetes      Colorectal Cancer Screening:     General: Patient Declines      Breast Cancer Screening:     General: Patient Declines      Cervical Cancer Screening:    General: Screening Not Indicated      Osteoporosis Screening:    General: Patient Declines      Abdominal Aortic Aneurysm (AAA) Screening:        General: Screening Not Indicated      Lung Cancer Screening:     General: Screening Not Indicated      Hepatitis C Screening:    General: Screening Current    Screening, Brief Intervention, and Referral to Treatment (SBIRT)    Screening      AUDIT-C Screenin) How often did you have a drink containing alcohol in the past year? monthly or less  2) How many drinks did you have on a typical day when you were drinking in the past year? 1 to 2  3) How often did you have 6 or more drinks on one occasion in the past year? never    AUDIT-C Score: 1  Interpretation: Score 0-2 (female): Negative screen for alcohol misuse    Single Item Drug Screening:  How often have you used an illegal drug (including marijuana) or a prescription medication for non-medical reasons in the past year? never    Single Item Drug Screen Score: 0  Interpretation: Negative screen for possible drug use disorder    Other Counseling Topics:   Skin self-exam and calcium and vitamin D intake and regular weightbearing exercise  No results found  Physical Exam:     /64 (BP Location: Right arm, Patient Position: Sitting, Cuff Size: Large)   Pulse 98   Temp 97 8 °F (36 6 °C)   Resp 18   Ht 4' 11" (1 499 m)   Wt 96 6 kg (213 lb)   BMI 43 02 kg/m²     Physical Exam  Vitals reviewed  HENT:      Head: Normocephalic  Right Ear: Tympanic membrane, ear canal and external ear normal       Left Ear: Tympanic membrane, ear canal and external ear normal       Nose: Nose normal       Mouth/Throat:      Mouth: Mucous membranes are moist    Eyes:      Extraocular Movements: Extraocular movements intact  Conjunctiva/sclera: Conjunctivae normal       Pupils: Pupils are equal, round, and reactive to light  Neck:      Vascular: No carotid bruit  Cardiovascular:      Rate and Rhythm: Normal rate and regular rhythm  Pulses: Normal pulses  Pulmonary:      Effort: Pulmonary effort is normal       Breath sounds: No wheezing or rales  Abdominal:      General: There is no distension  Palpations: There is no mass  Tenderness: There is no abdominal tenderness  Musculoskeletal:         General: Tenderness (mild over low lumbar spine) and deformity (mild diffuse OA changes) present  No swelling  Cervical back: No tenderness  Right lower leg: No edema  Left lower leg: No edema  Lymphadenopathy:      Cervical: No cervical adenopathy  Skin:     General: Skin is warm  Capillary Refill: Capillary refill takes less than 2 seconds  Neurological:      Mental Status: She is alert  Cranial Nerves: No cranial nerve deficit  Sensory: No sensory deficit  Motor: No weakness  Gait: Gait abnormal (mildly antalgic appearing  Ambulates with cane)  Comments: [employer] 5/5   Psychiatric:         Mood and Affect: Mood normal          Behavior: Behavior normal          Thought Content:  Thought content normal          Judgment: Judgment normal       Comments: PHQ-2/9 Depression Screening    Little interest or pleasure in doing things: 0 - not at all  Feeling down, depressed, or hopeless: 1 - several days  PHQ-2 Score: 1  PHQ-2 Interpretation: Negative depression screen               Arabella Solorzano MD

## 2023-02-02 NOTE — ASSESSMENT & PLAN NOTE
Lab Results   Component Value Date    HGBA1C 9 0 (A) 02/01/2023   Poor control  Urged diet control  Pt states that she does not want to start any injectable meds and cannot afford many other meds  Will Increase metformin to 1000mg bid and glimepiride to 4mg qd  I discussed potential risks for hypoglycemia   Recheck 3m

## 2023-02-02 NOTE — PATIENT INSTRUCTIONS
Medicare Preventive Visit Patient Instructions  Thank you for completing your Welcome to Medicare Visit or Medicare Annual Wellness Visit today  Your next wellness visit will be due in one year (2/3/2024)  The screening/preventive services that you may require over the next 5-10 years are detailed below  Some tests may not apply to you based off risk factors and/or age  Screening tests ordered at today's visit but not completed yet may show as past due  Also, please note that scanned in results may not display below  Preventive Screenings:  Service Recommendations Previous Testing/Comments   Colorectal Cancer Screening  * Colonoscopy    * Fecal Occult Blood Test (FOBT)/Fecal Immunochemical Test (FIT)  * Fecal DNA/Cologuard Test  * Flexible Sigmoidoscopy Age: 39-70 years old   Colonoscopy: every 10 years (may be performed more frequently if at higher risk)  OR  FOBT/FIT: every 1 year  OR  Cologuard: every 3 years  OR  Sigmoidoscopy: every 5 years  Screening may be recommended earlier than age 39 if at higher risk for colorectal cancer  Also, an individualized decision between you and your healthcare provider will decide whether screening between the ages of 74-80 would be appropriate  Colonoscopy: Not on file  FOBT/FIT: Not on file  Cologuard: Not on file  Sigmoidoscopy: Not on file    Patient Declines     Breast Cancer Screening Age: 36 years old  Frequency: every 1-2 years  Not required if history of left and right mastectomy Mammogram: Not on file    Patient Declines   Cervical Cancer Screening Between the ages of 21-29, pap smear recommended once every 3 years  Between the ages of 33-67, can perform pap smear with HPV co-testing every 5 years     Recommendations may differ for women with a history of total hysterectomy, cervical cancer, or abnormal pap smears in past  Pap Smear: Not on file    Screening Not Indicated   Hepatitis C Screening Once for adults born between 1945 and 1965  More frequently in patients at high risk for Hepatitis C Hep C Antibody: 01/03/2020    Screening Current   Diabetes Screening 1-2 times per year if you're at risk for diabetes or have pre-diabetes Fasting glucose: 170 mg/dL (9/9/2022)  A1C: 9 0 (2/1/2023)  Screening Not Indicated  History Diabetes   Cholesterol Screening Once every 5 years if you don't have a lipid disorder  May order more often based on risk factors  Lipid panel: 09/09/2022    Screening Not Indicated  History Lipid Disorder     Other Preventive Screenings Covered by Medicare:  1  Abdominal Aortic Aneurysm (AAA) Screening: covered once if your at risk  You're considered to be at risk if you have a family history of AAA  2  Lung Cancer Screening: covers low dose CT scan once per year if you meet all of the following conditions: (1) Age 50-69; (2) No signs or symptoms of lung cancer; (3) Current smoker or have quit smoking within the last 15 years; (4) You have a tobacco smoking history of at least 20 pack years (packs per day multiplied by number of years you smoked); (5) You get a written order from a healthcare provider  3  Glaucoma Screening: covered annually if you're considered high risk: (1) You have diabetes OR (2) Family history of glaucoma OR (3)  aged 48 and older OR (3)  American aged 72 and older  3  Osteoporosis Screening: covered every 2 years if you meet one of the following conditions: (1) You're estrogen deficient and at risk for osteoporosis based off medical history and other findings; (2) Have a vertebral abnormality; (3) On glucocorticoid therapy for more than 3 months; (4) Have primary hyperparathyroidism; (5) On osteoporosis medications and need to assess response to drug therapy  · Last bone density test (DXA Scan): Not on file  5  HIV Screening: covered annually if you're between the age of 12-76  Also covered annually if you are younger than 13 and older than 72 with risk factors for HIV infection   For pregnant patients, it is covered up to 3 times per pregnancy  Immunizations:  Immunization Recommendations   Influenza Vaccine Annual influenza vaccination during flu season is recommended for all persons aged >= 6 months who do not have contraindications   Pneumococcal Vaccine   * Pneumococcal conjugate vaccine = PCV13 (Prevnar 13), PCV15 (Vaxneuvance), PCV20 (Prevnar 20)  * Pneumococcal polysaccharide vaccine = PPSV23 (Pneumovax) Adults 25-60 years old: 1-3 doses may be recommended based on certain risk factors  Adults 72 years old: 1-2 doses may be recommended based off what pneumonia vaccine you previously received   Hepatitis B Vaccine 3 dose series if at intermediate or high risk (ex: diabetes, end stage renal disease, liver disease)   Tetanus (Td) Vaccine - COST NOT COVERED BY MEDICARE PART B Following completion of primary series, a booster dose should be given every 10 years to maintain immunity against tetanus  Td may also be given as tetanus wound prophylaxis  Tdap Vaccine - COST NOT COVERED BY MEDICARE PART B Recommended at least once for all adults  For pregnant patients, recommended with each pregnancy  Shingles Vaccine (Shingrix) - COST NOT COVERED BY MEDICARE PART B  2 shot series recommended in those aged 48 and above     Health Maintenance Due:      Topic Date Due   • DXA SCAN  Never done   • Hepatitis C Screening  Completed     Immunizations Due:      Topic Date Due   • COVID-19 Vaccine (1) Never done     Advance Directives   What are advance directives? Advance directives are legal documents that state your wishes and plans for medical care  These plans are made ahead of time in case you lose your ability to make decisions for yourself  Advance directives can apply to any medical decision, such as the treatments you want, and if you want to donate organs  What are the types of advance directives? There are many types of advance directives, and each state has rules about how to use them   You may choose a combination of any of the following:  · Living will: This is a written record of the treatment you want  You can also choose which treatments you do not want, which to limit, and which to stop at a certain time  This includes surgery, medicine, IV fluid, and tube feedings  · Durable power of  for healthcare [facility]): This is a written record that states who you want to make healthcare choices for you when you are unable to make them for yourself  This person, called a proxy, is usually a family member or a friend  You may choose more than 1 proxy  · Do not resuscitate (DNR) order:  A DNR order is used in case your heart stops beating or you stop breathing  It is a request not to have certain forms of treatment, such as CPR  A DNR order may be included in other types of advance directives  · Medical directive: This covers the care that you want if you are in a coma, near death, or unable to make decisions for yourself  You can list the treatments you want for each condition  Treatment may include pain medicine, surgery, blood transfusions, dialysis, IV or tube feedings, and a ventilator (breathing machine)  · Values history: This document has questions about your views, beliefs, and how you feel and think about life  This information can help others choose the care that you would choose  Why are advance directives important? An advance directive helps you control your care  Although spoken wishes may be used, it is better to have your wishes written down  Spoken wishes can be misunderstood, or not followed  Treatments may be given even if you do not want them  An advance directive may make it easier for your family to make difficult choices about your care  Weight Management   Why it is important to manage your weight:  Being overweight increases your risk of health conditions such as heart disease, high blood pressure, type 2 diabetes, and certain types of cancer   It can also increase your risk for osteoarthritis, sleep apnea, and other respiratory problems  Aim for a slow, steady weight loss  Even a small amount of weight loss can lower your risk of health problems  How to lose weight safely:  A safe and healthy way to lose weight is to eat fewer calories and get regular exercise  You can lose up about 1 pound a week by decreasing the number of calories you eat by 500 calories each day  Healthy meal plan for weight management:  A healthy meal plan includes a variety of foods, contains fewer calories, and helps you stay healthy  A healthy meal plan includes the following:  · Eat whole-grain foods more often  A healthy meal plan should contain fiber  Fiber is the part of grains, fruits, and vegetables that is not broken down by your body  Whole-grain foods are healthy and provide extra fiber in your diet  Some examples of whole-grain foods are whole-wheat breads and pastas, oatmeal, brown rice, and bulgur  · Eat a variety of vegetables every day  Include dark, leafy greens such as spinach, kale, madelyn greens, and mustard greens  Eat yellow and orange vegetables such as carrots, sweet potatoes, and winter squash  · Eat a variety of fruits every day  Choose fresh or canned fruit (canned in its own juice or light syrup) instead of juice  Fruit juice has very little or no fiber  · Eat low-fat dairy foods  Drink fat-free (skim) milk or 1% milk  Eat fat-free yogurt and low-fat cottage cheese  Try low-fat cheeses such as mozzarella and other reduced-fat cheeses  · Choose meat and other protein foods that are low in fat  Choose beans or other legumes such as split peas or lentils  Choose fish, skinless poultry (chicken or turkey), or lean cuts of red meat (beef or pork)  Before you cook meat or poultry, cut off any visible fat  · Use less fat and oil  Try baking foods instead of frying them   Add less fat, such as margarine, sour cream, regular salad dressing and mayonnaise to foods  Eat fewer high-fat foods  Some examples of high-fat foods include french fries, doughnuts, ice cream, and cakes  · Eat fewer sweets  Limit foods and drinks that are high in sugar  This includes candy, cookies, regular soda, and sweetened drinks  Exercise:  Exercise at least 30 minutes per day on most days of the week  Some examples of exercise include walking, biking, dancing, and swimming  You can also fit in more physical activity by taking the stairs instead of the elevator or parking farther away from stores  Ask your healthcare provider about the best exercise plan for you  © Copyright [employer] 2018 Information is for End User's use only and may not be sold, redistributed or otherwise used for commercial purposes   All illustrations and images included in CareNotes® are the copyrighted property of A D A M , [employer]  or [address] [place], [place] [address]paDignity [facility]

## 2023-02-27 NOTE — TELEPHONE ENCOUNTER
Patient actually did have a AWV in 12/21    So, I rescheduled the 3 month FU for this month and scheduled her AWV in April room air

## 2023-04-05 ENCOUNTER — OFFICE VISIT (OUTPATIENT)
Dept: CARDIOLOGY CLINIC | Facility: CLINIC | Age: 83
End: 2023-04-05

## 2023-04-05 VITALS
BODY MASS INDEX: 43.3 KG/M2 | DIASTOLIC BLOOD PRESSURE: 70 MMHG | WEIGHT: 214.4 LBS | OXYGEN SATURATION: 97 % | SYSTOLIC BLOOD PRESSURE: 115 MMHG | HEART RATE: 89 BPM

## 2023-04-05 DIAGNOSIS — I25.10 CORONARY ARTERY DISEASE INVOLVING NATIVE CORONARY ARTERY OF NATIVE HEART WITHOUT ANGINA PECTORIS: Primary | ICD-10-CM

## 2023-04-05 DIAGNOSIS — I10 PRIMARY HYPERTENSION: ICD-10-CM

## 2023-04-05 DIAGNOSIS — E78.2 MIXED HYPERLIPIDEMIA: ICD-10-CM

## 2023-04-05 RX ORDER — AMLODIPINE BESYLATE 5 MG/1
TABLET ORAL
COMMUNITY
Start: 2023-03-24

## 2023-04-05 NOTE — PROGRESS NOTES
Cardiology Follow Up    [facility] KANE Carpenter  1940  [id number]  [facility]  [address]  [address]  [address] [address]  [phone]    1  Coronary artery disease involving native coronary artery of native heart without angina pectoris        2  Primary hypertension        3  Mixed hyperlipidemia              Discussion/Summary: All of her assessed cardiac problems are stable  I have reviewed her medications and made no changes  No cardiac testing is ordered  I encouraged her to get more active and get her weight down and DM under better control  Interval History: She has not had any cardiac problems since her last OV  She tries to stay active and denies CP, SOB, palpitations  She has CAD with L Cx stenting in 2010  LDL is 126 - she refuses to take statins  Her weight is down from 222 to 214 lbs  A1C 9 0    BP is well controlled      Patient Active Problem List   Diagnosis   • Hypertension   • Hyperlipidemia   • Morbid obesity, unspecified obesity type ([facility] )   • Allergic rhinitis   • Esophageal reflux   • Type 2 diabetes mellitus ([facility] )   • Chronic right-sided low back pain with right-sided sciatica   • Coronary artery disease involving native coronary artery of native heart without angina pectoris   • Cotton wool exudates   • Vaccination not carried out because of patient refusal     Past Medical History:   Diagnosis Date   • Bereavement, uncomplicated     [id number]  RESOLVED   • Coronary artery disease    • Diabetes mellitus ([facility] )    • Hyperlipidemia    • Hypertension    • Lymphedema    • Morbid [facility] ([facility] )    • Myocardial infarction [facility])      Social History     Socioeconomic History   • Marital status:      Spouse name: Not on file   • Number of children: Not on file   • Years of education: Not on file   • Highest education level: Not on file   Occupational History   • Occupation: RETIRED   Tobacco Use   • Smoking status: Never   • Smokeless tobacco: Never   Substance and Sexual Activity   • Alcohol use: No   • Drug use: Never   • Sexual activity: Not Currently   Other Topics Concern   • Not on file   Social History Narrative    DAILY [employer]     Social Determinants of Health     Financial Resource Strain: Low Risk    • Difficulty of Paying Living Expenses: Not very hard   Food Insecurity: Not on file   Transportation Needs: No Transportation Needs   • Lack of Transportation (Medical): No   • Lack of Transportation (Non-Medical):  No   Physical Activity: Not on file   Stress: Not on file   Social Connections: Not on file   Intimate Partner Violence: Not on file   Housing Stability: Not on file      Family History   Problem Relation Age of Onset   • Heart disease Mother    • Diabetes type II Mother    • Hypertension Mother    • Heart disease Sister    • Diabetes type II Sister    • Diabetes type II Brother    • Diabetes type II Family      Past Surgical History:   Procedure Laterality Date   • CARDIAC CATHETERIZATION     • HIP SURGERY     • TUBAL LIGATION         Current Outpatient Medications:   •  amLODIPine (NORVASC) 5 mg tablet, , Disp: , Rfl:   •  Ascorbic Acid (VITAMIN C) 500 MG CAPS, Take 1 tablet by mouth daily, Disp: , Rfl:   •  aspirin 81 MG tablet, Take 1 tablet by mouth as needed Take 1 tablet weekly , Disp: , Rfl:   •  glimepiride (AMARYL) 4 mg tablet, Take 1 tablet (4 mg total) by mouth daily with breakfast, Disp: 90 tablet, Rfl: 1  •  hydrochlorothiazide (HYDRODIURIL) 25 mg tablet, Take 1 tablet (25 mg total) by mouth daily, Disp: 90 tablet, Rfl: 4  •  hydrochlorothiazide (HYDRODIURIL) 25 mg tablet, Take 1 tablet (25 mg total) by mouth daily, Disp: 90 tablet, Rfl: 4  •  lisinopril (ZESTRIL) 40 mg tablet, Take 1 tablet (40 mg total) by mouth daily, Disp: 90 tablet, Rfl: 3  •  metFORMIN (GLUCOPHAGE) 1000 MG tablet, Take 1 tablet (1,000 mg total) by mouth 2 (two) times a day with meals, Disp: 180 tablet, Rfl: 1  •  metoprolol succinate (TOPROL-XL) 100 mg 24 hr tablet, Take 1 tablet (100 mg total) by mouth daily, Disp: 90 tablet, Rfl: 4  •  terconazole (TERAZOL 7) 0 4 % vaginal cream, Insert 1 applicator into the vagina daily at bedtime (Patient not taking: Reported on 10/26/2022), Disp: 45 g, Rfl: 1  Allergies   Allergen Reactions   • Jardiance [Empagliflozin]    • Prandin [Repaglinide] Edema   • Sulfa Antibiotics Other (See Comments)     Vitals:    04/05/23 0942   BP: 115/70   BP Location: Right arm   Patient Position: Sitting   Cuff Size: Standard   Pulse: 89   SpO2: 97%   Weight: 97 3 kg (214 lb 6 4 oz)     Weight (last 2 days)     Date/Time Weight    04/05/23 0942 97 3 (214 4)         Blood pressure 115/70, pulse 89, weight 97 3 kg (214 lb 6 4 oz), SpO2 97 %, not currently breastfeeding , Body mass index is 43 3 kg/m²  Labs:  Office Visit on 02/01/2023   Component Date Value   • Hemoglobin A1C 02/01/2023 9 0 (A)    Orders Only on 11/15/2022   Component Date Value   • Right Eye Diabetic Retin* 11/15/2022 Positive    • Left Eye Diabetic Retino* 11/15/2022 Positive    Office Visit on 10/26/2022   Component Date Value   • Hemoglobin A1C 10/26/2022 9 0 (A)      Imaging: No results found  Review of Systems:  Review of Systems   Constitutional: Negative for diaphoresis, fatigue, fever and unexpected weight change  HENT: Negative  Respiratory: Negative for cough, shortness of breath and wheezing  Cardiovascular: Negative for chest pain, palpitations and leg swelling  Gastrointestinal: Negative for abdominal pain, diarrhea and nausea  Musculoskeletal: Negative for gait problem and myalgias  Skin: Negative for rash  Neurological: Negative for dizziness and numbness  Psychiatric/Behavioral: Negative  Physical Exam:  Physical Exam  Constitutional:       Appearance: She is well-developed  HENT:      Head: Normocephalic and atraumatic     Eyes:      Pupils: Pupils are equal, round, and reactive to light  Neck:      Vascular: No JVD  Cardiovascular:      Rate and Rhythm: Regular rhythm  Pulses: Normal pulses  Carotid pulses are 2+ on the right side and 2+ on the left side  Heart sounds: S1 normal and S2 normal    Pulmonary:      Effort: Pulmonary effort is normal       Breath sounds: Normal breath sounds  No wheezing or rales  Abdominal:      General: Bowel sounds are normal       Palpations: Abdomen is soft  Tenderness: There is no abdominal tenderness  Musculoskeletal:         General: No tenderness  Normal range of motion  Cervical back: Normal range of motion and neck supple  Skin:     General: Skin is warm  Neurological:      Mental Status: She is alert and oriented to person, place, and time  Cranial Nerves: No cranial nerve deficit  Deep Tendon Reflexes: Reflexes are normal and symmetric

## 2023-05-04 DIAGNOSIS — I10 HYPERTENSION, UNSPECIFIED TYPE: ICD-10-CM

## 2023-05-04 DIAGNOSIS — I10 PRIMARY HYPERTENSION: Primary | ICD-10-CM

## 2023-05-04 RX ORDER — LISINOPRIL 40 MG/1
40 TABLET ORAL DAILY
Qty: 90 TABLET | Refills: 3 | Status: SHIPPED | OUTPATIENT
Start: 2023-05-04 | End: 2024-05-03

## 2023-05-04 RX ORDER — AMLODIPINE BESYLATE 5 MG/1
5 TABLET ORAL DAILY
Qty: 90 TABLET | Refills: 1 | Status: SHIPPED | OUTPATIENT
Start: 2023-05-04

## 2023-05-12 ENCOUNTER — OFFICE VISIT (OUTPATIENT)
Dept: FAMILY MEDICINE CLINIC | Facility: CLINIC | Age: 83
End: 2023-05-12

## 2023-05-12 VITALS
SYSTOLIC BLOOD PRESSURE: 124 MMHG | WEIGHT: 213 LBS | HEART RATE: 74 BPM | DIASTOLIC BLOOD PRESSURE: 84 MMHG | BODY MASS INDEX: 42.94 KG/M2 | TEMPERATURE: 97.2 F | HEIGHT: 59 IN | OXYGEN SATURATION: 98 %

## 2023-05-12 DIAGNOSIS — L29.9 PRURITIC DERMATITIS: ICD-10-CM

## 2023-05-12 DIAGNOSIS — N18.30 STAGE 3 CHRONIC KIDNEY DISEASE, UNSPECIFIED WHETHER STAGE 3A OR 3B CKD (HCC): ICD-10-CM

## 2023-05-12 DIAGNOSIS — E11.9 TYPE 2 DIABETES MELLITUS WITHOUT COMPLICATION, UNSPECIFIED WHETHER LONG TERM INSULIN USE (HCC): Primary | ICD-10-CM

## 2023-05-12 DIAGNOSIS — N18.30 TYPE 2 DIABETES MELLITUS WITH STAGE 3 CHRONIC KIDNEY DISEASE, WITHOUT LONG-TERM CURRENT USE OF INSULIN, UNSPECIFIED WHETHER STAGE 3A OR 3B CKD (HCC): ICD-10-CM

## 2023-05-12 DIAGNOSIS — E66.01 MORBID OBESITY, UNSPECIFIED OBESITY TYPE (HCC): ICD-10-CM

## 2023-05-12 DIAGNOSIS — I25.10 CORONARY ARTERY DISEASE INVOLVING NATIVE CORONARY ARTERY OF NATIVE HEART WITHOUT ANGINA PECTORIS: ICD-10-CM

## 2023-05-12 DIAGNOSIS — E11.22 TYPE 2 DIABETES MELLITUS WITH STAGE 3 CHRONIC KIDNEY DISEASE, WITHOUT LONG-TERM CURRENT USE OF INSULIN, UNSPECIFIED WHETHER STAGE 3A OR 3B CKD (HCC): ICD-10-CM

## 2023-05-12 DIAGNOSIS — I10 PRIMARY HYPERTENSION: ICD-10-CM

## 2023-05-12 PROBLEM — L30.8 PRURITIC DERMATITIS: Status: ACTIVE | Noted: 2023-05-12

## 2023-05-12 LAB — SL AMB POCT HEMOGLOBIN AIC: 8.7 (ref ?–6.5)

## 2023-05-12 NOTE — ASSESSMENT & PLAN NOTE
Sl dilated spider veins  Start Benadryl cream as directed   Recheck 1-2w if not improved - earlier if worse

## 2023-05-12 NOTE — ASSESSMENT & PLAN NOTE
Lab Results   Component Value Date    EGFR 56 04/11/2023    EGFR 51 09/09/2022    EGFR 49 10/08/2021    CREATININE 0 96 04/11/2023    CREATININE 1 04 09/09/2022    CREATININE 1 08 10/08/2021   Creat sl improved on last labs  Continue present care     Recheck 6m

## 2023-05-12 NOTE — PROGRESS NOTES
Name: Jg Soares      : 1940      MRN: [id number]  Encounter Provider: Ivett Toney MD  Encounter Date: 2023   Encounter department: [address] [place] [address]     1  Type 2 diabetes mellitus without complication, unspecified whether long term insulin use ([facility])  Assessment & Plan:    Lab Results   Component Value Date    HGBA1C 8 7 (A) 2023     Control remains suboptimal   I reviewed with pt  Change metformin to Janumet /1000 qd  Samples given  Recheck labs in 3m and f/u in office 1 week later    Orders:  -     POCT hemoglobin A1c  -     Comprehensive metabolic panel; Future; Expected date: 2023  -     CBC and differential; Future; Expected date: 2023  -     Lipid Panel with Direct LDL reflex; Future; Expected date: 2023  -     Hemoglobin A1C; Future; Expected date: 2023  -     SITagliptin-metFORMIN HCl -1000 MG TB24; Take 1 tablet by mouth daily    2  Pruritic dermatitis  Assessment & Plan:  Sl dilated spider veins  Start Benadryl cream as directed  Recheck 1-2w if not improved - earlier if worse    Orders:  -     diphenhydrAMINE (BENADRYL) 2 % cream; Apply topically 3 (three) times a day as needed for itching    3  Stage 3 chronic kidney disease, unspecified whether stage 3a or 3b CKD [facility])  Assessment & Plan:  Lab Results   Component Value Date    EGFR 56 2023    EGFR 51 2022    EGFR 49 10/08/2021    CREATININE 0 96 2023    CREATININE 1 04 2022    CREATININE 1 08 10/08/2021   Creat sl improved on last labs  Continue present care    Recheck 6m      4  Morbid obesity, unspecified obesity type [facility])  Assessment & Plan:  I reviewed with pt  We discussed diet and weight loss  Continue present care  Recheck 6m      5   Type 2 diabetes mellitus with stage 3 chronic kidney disease, without long-term current use of insulin, unspecified whether stage 3a or 3b CKD ([facility] )  Assessment & Plan:    Lab "Results   Component Value Date    HGBA1C 8 7 (A) 05/12/2023     Control remains suboptimal   I reviewed with pt  Change metformin to Janumet /1000 qd  Samples given  Recheck labs in 3m and f/u in office 1 week later      6  Primary hypertension  Assessment & Plan:  Well controlled  Cont present treatment  Monitor labs  Recheck 6m        7  Coronary artery disease involving native coronary artery of native heart without angina pectoris  Assessment & Plan:  Pt symptomatic  Continue present care  Recheck 6m             Subjective     f/u multiple med issues  - pt notes increased spider veins on her R arm  (+) pruritic but no dermatitis/other skin changes  - pt has not been checking her home BGs  Trying to work on diet  A1C in office today = 8 7  Pt states that she has not missed any of her meds  - pt states that her back remains \"as crazy as ever\"  Still has good days and bad but pt is able to \"manage\"    No change in bowel/bladder, worsening leg weakness or other symptoms noted  - pt is not very active  She denies CP, palpitations, lightheadedness or other CV symptoms with or without exertion  - pt denies any new GI or  complaints    Review of Systems   Constitutional: Negative  HENT: Negative  Eyes: Negative  Respiratory: Negative  Cardiovascular: Negative  Gastrointestinal: Negative  Endocrine: Negative  Genitourinary: Negative  Musculoskeletal: Positive for arthralgias, back pain (unchanged) and gait problem (due to back pain  Uses a cane)  Negative for myalgias  Skin: Negative  Allergic/Immunologic: Negative  Neurological: Negative for dizziness, weakness, light-headedness, numbness and headaches  Hematological: Negative  Psychiatric/Behavioral: Negative          Past Medical History:   Diagnosis Date   • Bereavement, uncomplicated     [id number]  RESOLVED   • Coronary artery disease    • Diabetes mellitus ([facility] )    • Hyperlipidemia    • Hypertension    • Lymphedema " • Morbid obesity ([facility] )    • Myocardial infarction [facility])      Past Surgical History:   Procedure Laterality Date   • CARDIAC CATHETERIZATION     • HIP SURGERY     • TUBAL LIGATION       Family History   Problem Relation Age of Onset   • Heart disease Mother    • Diabetes type II Mother    • Hypertension Mother    • Heart disease Sister    • Diabetes type II Sister    • Diabetes type II Brother    • Diabetes type II Family      Social History     Socioeconomic History   • Marital status:      Spouse name: None   • Number of children: None   • Years of education: None   • Highest education level: None   Occupational History   • Occupation: RETIRED   Tobacco Use   • Smoking status: Never   • Smokeless tobacco: Never   Substance and Sexual Activity   • Alcohol use: No   • Drug use: Never   • Sexual activity: Not Currently   Other Topics Concern   • None   Social History Narrative    DAILY [employer]     Social Determinants of Health     Financial Resource Strain: Low Risk    • Difficulty of Paying Living Expenses: Not very hard   Food Insecurity: Not on file   Transportation Needs: No Transportation Needs   • Lack of Transportation (Medical): No   • Lack of Transportation (Non-Medical):  No   Physical Activity: Not on file   Stress: Not on file   Social Connections: Not on file   Intimate Partner Violence: Not on file   Housing Stability: Not on file     Current Outpatient Medications on File Prior to Visit   Medication Sig   • amLODIPine (NORVASC) 5 mg tablet Take 1 tablet (5 mg total) by mouth daily   • Ascorbic Acid (VITAMIN C) 500 MG CAPS Take 1 tablet by mouth daily   • glimepiride (AMARYL) 4 mg tablet Take 1 tablet (4 mg total) by mouth daily with breakfast   • hydrochlorothiazide (HYDRODIURIL) 25 mg tablet Take 1 tablet (25 mg total) by mouth daily   • lisinopril (ZESTRIL) 40 mg tablet Take 1 tablet (40 mg total) by mouth daily   • metoprolol succinate (TOPROL-XL) 100 mg 24 hr tablet Take 1 tablet "(100 mg total) by mouth daily   • [DISCONTINUED] aspirin 81 MG tablet Take 1 tablet by mouth as needed Take 1 tablet weekly    • [DISCONTINUED] hydrochlorothiazide (HYDRODIURIL) 25 mg tablet Take 1 tablet (25 mg total) by mouth daily   • [DISCONTINUED] metFORMIN (GLUCOPHAGE) 1000 MG tablet Take 1 tablet (1,000 mg total) by mouth 2 (two) times a day with meals   • [DISCONTINUED] terconazole (TERAZOL 7) 0 4 % vaginal cream Insert 1 applicator into the vagina daily at bedtime (Patient not taking: Reported on 10/26/2022)     Allergies   Allergen Reactions   • Jardiance [Empagliflozin]    • Prandin [Repaglinide] Edema   • Sulfa Antibiotics Other (See Comments)     Immunization History   Administered Date(s) Administered   • Tdap 06/04/2008, 01/19/2015       Objective     /84 (BP Location: Left arm, Patient Position: Sitting, Cuff Size: Large)   Pulse 74   Temp (!) 97 2 °F (36 2 °C)   Ht 4' 11\" (1 499 m)   Wt 96 6 kg (213 lb)   SpO2 98%   BMI 43 02 kg/m²     Physical Exam  Vitals reviewed  Constitutional:       Appearance: She is well-developed  She is not diaphoretic  HENT:      Head: Normocephalic and atraumatic  Right Ear: Tympanic membrane, ear canal and external ear normal       Left Ear: Tympanic membrane, ear canal and external ear normal       Mouth/Throat:      Mouth: Mucous membranes are moist    Eyes:      Extraocular Movements: Extraocular movements intact  Conjunctiva/sclera: Conjunctivae normal       Pupils: Pupils are equal, round, and reactive to light  Neck:      Thyroid: No thyromegaly  Vascular: No JVD  Cardiovascular:      Rate and Rhythm: Normal rate and regular rhythm  Pulses: no weak pulses          Dorsalis pedis pulses are 2+ on the right side and 2+ on the left side  Heart sounds: No murmur heard  Pulmonary:      Effort: Pulmonary effort is normal       Breath sounds: Normal breath sounds  Abdominal:      General: There is no distension        " Palpations: Abdomen is soft  There is no mass  Tenderness: There is no abdominal tenderness  Musculoskeletal:         General: No swelling  Normal range of motion  Cervical back: Normal range of motion and neck supple  No tenderness  No muscular tenderness  Feet:      Right foot:      Skin integrity: No ulcer, skin breakdown, erythema, warmth, callus or dry skin  Left foot:      Skin integrity: No ulcer, skin breakdown, erythema, warmth, callus or dry skin  Lymphadenopathy:      Cervical: No cervical adenopathy  Skin:     General: Skin is warm and dry  Capillary Refill: Capillary refill takes less than 2 seconds  Neurological:      Mental Status: She is alert and oriented to person, place, and time  Cranial Nerves: No cranial nerve deficit  Sensory: No sensory deficit  Motor: No weakness or abnormal muscle tone  Coordination: Coordination normal       Deep Tendon Reflexes: Reflexes are normal and symmetric  Reflexes normal    Psychiatric:         Mood and Affect: Mood normal          Behavior: Behavior normal          Thought Content: Thought content normal          Judgment: Judgment normal      Patient's shoes and socks removed  Right Foot/Ankle   Right Foot Inspection  Skin Exam: skin normal and skin intact  No dry skin, no warmth, no callus, no erythema, no maceration, no abnormal color, no pre-ulcer, no ulcer and no callus  Toe Exam: ROM and strength within normal limits  Sensory   Vibration: intact  Monofilament testing: intact    Vascular  Capillary refills: < 3 seconds  The right DP pulse is 2+  Left Foot/Ankle  Left Foot Inspection  Skin Exam: skin normal and skin intact  No dry skin, no warmth, no erythema, no maceration, normal color, no pre-ulcer, no ulcer and no callus  Toe Exam: ROM and strength within normal limits       Sensory   Vibration: intact  Monofilament testing: intact    Vascular  Capillary refills: < 3 seconds  The left DP pulse is 2+       Assign Risk Category  No deformity present  No loss of protective sensation  No weak pulses  Risk: 0    Isaak Mcfarland MD

## 2023-05-12 NOTE — ASSESSMENT & PLAN NOTE
Lab Results   Component Value Date    HGBA1C 8 7 (A) 05/12/2023     Control remains suboptimal   I reviewed with pt  Change metformin to Janumet /1000 qd   Samples given  Recheck labs in  and f/u in office 1 week later

## 2023-07-18 ENCOUNTER — APPOINTMENT (OUTPATIENT)
Dept: LAB | Facility: CLINIC | Age: 83
End: 2023-07-18
Payer: COMMERCIAL

## 2023-07-18 DIAGNOSIS — E11.9 TYPE 2 DIABETES MELLITUS WITHOUT COMPLICATION, UNSPECIFIED WHETHER LONG TERM INSULIN USE (HCC): ICD-10-CM

## 2023-07-18 LAB
ALBUMIN SERPL BCP-MCNC: 3.5 G/DL (ref 3.5–5)
ALP SERPL-CCNC: 61 U/L (ref 46–116)
ALT SERPL W P-5'-P-CCNC: 20 U/L (ref 12–78)
ANION GAP SERPL CALCULATED.3IONS-SCNC: 6 MMOL/L
AST SERPL W P-5'-P-CCNC: 24 U/L (ref 5–45)
BASOPHILS # BLD AUTO: 0.06 THOUSANDS/ÂΜL (ref 0–0.1)
BASOPHILS NFR BLD AUTO: 1 % (ref 0–1)
BILIRUB SERPL-MCNC: 0.41 MG/DL (ref 0.2–1)
BUN SERPL-MCNC: 20 MG/DL (ref 5–25)
CALCIUM SERPL-MCNC: 9.5 MG/DL (ref 8.3–10.1)
CHLORIDE SERPL-SCNC: 106 MMOL/L (ref 96–108)
CHOLEST SERPL-MCNC: 218 MG/DL
CO2 SERPL-SCNC: 23 MMOL/L (ref 21–32)
CREAT SERPL-MCNC: 1.06 MG/DL (ref 0.6–1.3)
EOSINOPHIL # BLD AUTO: 0.36 THOUSAND/ÂΜL (ref 0–0.61)
EOSINOPHIL NFR BLD AUTO: 4 % (ref 0–6)
ERYTHROCYTE [DISTWIDTH] IN BLOOD BY AUTOMATED COUNT: 12.8 % (ref 11.6–15.1)
EST. AVERAGE GLUCOSE BLD GHB EST-MCNC: 237 MG/DL
GFR SERPL CREATININE-BSD FRML MDRD: 49 ML/MIN/1.73SQ M
GLUCOSE P FAST SERPL-MCNC: 203 MG/DL (ref 65–99)
HBA1C MFR BLD: 9.9 %
HCT VFR BLD AUTO: 36 % (ref 34.8–46.1)
HDLC SERPL-MCNC: 35 MG/DL
HGB BLD-MCNC: 11.8 G/DL (ref 11.5–15.4)
IMM GRANULOCYTES # BLD AUTO: 0.02 THOUSAND/UL (ref 0–0.2)
IMM GRANULOCYTES NFR BLD AUTO: 0 % (ref 0–2)
LDLC SERPL CALC-MCNC: 114 MG/DL (ref 0–100)
LYMPHOCYTES # BLD AUTO: 2.68 THOUSANDS/ÂΜL (ref 0.6–4.47)
LYMPHOCYTES NFR BLD AUTO: 31 % (ref 14–44)
MCH RBC QN AUTO: 29.9 PG (ref 26.8–34.3)
MCHC RBC AUTO-ENTMCNC: 32.8 G/DL (ref 31.4–37.4)
MCV RBC AUTO: 91 FL (ref 82–98)
MONOCYTES # BLD AUTO: 0.8 THOUSAND/ÂΜL (ref 0.17–1.22)
MONOCYTES NFR BLD AUTO: 9 % (ref 4–12)
NEUTROPHILS # BLD AUTO: 4.7 THOUSANDS/ÂΜL (ref 1.85–7.62)
NEUTS SEG NFR BLD AUTO: 55 % (ref 43–75)
NRBC BLD AUTO-RTO: 0 /100 WBCS
PLATELET # BLD AUTO: 192 THOUSANDS/UL (ref 149–390)
PMV BLD AUTO: 12.6 FL (ref 8.9–12.7)
POTASSIUM SERPL-SCNC: 3.9 MMOL/L (ref 3.5–5.3)
PROT SERPL-MCNC: 7.3 G/DL (ref 6.4–8.4)
RBC # BLD AUTO: 3.94 MILLION/UL (ref 3.81–5.12)
SODIUM SERPL-SCNC: 135 MMOL/L (ref 135–147)
TRIGL SERPL-MCNC: 343 MG/DL
WBC # BLD AUTO: 8.62 THOUSAND/UL (ref 4.31–10.16)

## 2023-07-18 PROCEDURE — 36415 COLL VENOUS BLD VENIPUNCTURE: CPT

## 2023-07-18 PROCEDURE — 80053 COMPREHEN METABOLIC PANEL: CPT

## 2023-07-18 PROCEDURE — 85025 COMPLETE CBC W/AUTO DIFF WBC: CPT

## 2023-07-18 PROCEDURE — 83036 HEMOGLOBIN GLYCOSYLATED A1C: CPT

## 2023-07-18 PROCEDURE — 80061 LIPID PANEL: CPT

## 2023-07-27 ENCOUNTER — RA CDI HCC (OUTPATIENT)
Dept: OTHER | Facility: HOSPITAL | Age: 83
End: 2023-07-27

## 2023-07-27 NOTE — PROGRESS NOTES
I48.91  [address] [facility] coding opportunities          Chart Reviewed number of suggestions sent to Provider: 1     Patients Insurance     Medicare Insurance: [employer]

## 2023-08-03 ENCOUNTER — OFFICE VISIT (OUTPATIENT)
Dept: FAMILY MEDICINE CLINIC | Facility: CLINIC | Age: 83
End: 2023-08-03
Payer: COMMERCIAL

## 2023-08-03 VITALS
WEIGHT: 205.4 LBS | SYSTOLIC BLOOD PRESSURE: 116 MMHG | HEART RATE: 72 BPM | HEIGHT: 59 IN | TEMPERATURE: 96.2 F | OXYGEN SATURATION: 97 % | DIASTOLIC BLOOD PRESSURE: 60 MMHG | BODY MASS INDEX: 41.41 KG/M2

## 2023-08-03 DIAGNOSIS — N18.30 TYPE 2 DIABETES MELLITUS WITH STAGE 3 CHRONIC KIDNEY DISEASE, WITHOUT LONG-TERM CURRENT USE OF INSULIN, UNSPECIFIED WHETHER STAGE 3A OR 3B CKD (HCC): Primary | ICD-10-CM

## 2023-08-03 DIAGNOSIS — L30.9 ECZEMA, UNSPECIFIED TYPE: ICD-10-CM

## 2023-08-03 DIAGNOSIS — I10 PRIMARY HYPERTENSION: ICD-10-CM

## 2023-08-03 DIAGNOSIS — E11.22 TYPE 2 DIABETES MELLITUS WITH STAGE 3 CHRONIC KIDNEY DISEASE, WITHOUT LONG-TERM CURRENT USE OF INSULIN, UNSPECIFIED WHETHER STAGE 3A OR 3B CKD (HCC): Primary | ICD-10-CM

## 2023-08-03 PROCEDURE — 99214 OFFICE O/P EST MOD 30 MIN: CPT | Performed by: FAMILY MEDICINE

## 2023-08-03 RX ORDER — TRIAMCINOLONE ACETONIDE 1 MG/G
CREAM TOPICAL 2 TIMES DAILY
Qty: 30 G | Refills: 2 | Status: SHIPPED | OUTPATIENT
Start: 2023-08-03

## 2023-08-03 NOTE — PROGRESS NOTES
Name: Dung Rosa      : 1940      MRN: [id number]  Encounter Provider: Laura Cabral MD  Encounter Date: 8/3/2023   Encounter department: [facility]     1. Type 2 diabetes mellitus with stage 3 chronic kidney disease, without long-term current use of insulin, unspecified whether stage 3a or 3b CKD ([address])  Assessment & Plan:    Lab Results   Component Value Date    HGBA1C 9.9 (H) 2023   Poor control. Refuses any injectable meds (insluin, GLP1 agonists), and has refused meds whenever the associated documentation suggests a side effect that she finds unacceptable. Pt understands the risks of poorly controlled DMII. Pt given samples of Tradjenta 5mg qd. Recheck 3m    Orders:  -     metFORMIN (GLUCOPHAGE) 1000 MG tablet; Take 1 tablet (1,000 mg total) by mouth 2 (two) times a day with meals  -     Basic metabolic panel; Future; Expected date: 2023  -     Hemoglobin A1C; Future; Expected date: 2023  -     linaGLIPtin 5 MG TABS; Take 5 mg by mouth daily with or without food    2. Primary hypertension  Assessment & Plan:  Well controlled. Cont present treatment. Monitor labs. Recheck 6m        3. Eczema, unspecified type  Assessment & Plan:  Focal area on back. Cannot r/o other cause. REC: trial of triamcinolone cream as directed. Recheck 2w if not improved - earlier if worse    Orders:  -     triamcinolone (KENALOG) 0.1 % cream; Apply topically 2 (two) times a day           Subjective     f/u multiple med issues  - pt states that she did not start the Janumet b/c she read about "sudden death" on the warnings. Does not check her BG at home. Has been taking the metformin bid and is doing better with diet. Weight down 8lb since last visit  - back is unchanged. Pt states that "I can manage". No recent worsening  - pt has a pruritic "sore" on her back that she would like checked.  (+) pruritic  - pt denies CP, palpitations, lightheadedness or other CV symptoms with or without exertion  - pt denies any new GI or  complaints      Review of Systems   Constitutional: Positive for unexpected weight change. Negative for appetite change. HENT: Negative. Eyes: Negative. Respiratory: Negative. Cardiovascular: Negative. Gastrointestinal: Negative. Endocrine: Negative. Negative for polydipsia and polyuria. Genitourinary: Negative. Musculoskeletal: Positive for arthralgias, back pain (chronic - unchanged) and gait problem (Uses a cane - unchanged). Negative for myalgias. Skin: Negative. Allergic/Immunologic: Negative. Neurological: Negative for dizziness, weakness, light-headedness, numbness and headaches. Hematological: Negative. Psychiatric/Behavioral: Negative.         Past Medical History:   Diagnosis Date   • Bereavement, uncomplicated     [id number]  RESOLVED   • Coronary artery disease    • Diabetes mellitus ([address] )    • Hyperlipidemia    • Hypertension    • Lymphedema    • Morbid obesity ([address])    • Myocardial infarction [facility])      Past Surgical History:   Procedure Laterality Date   • CARDIAC CATHETERIZATION     • HIP SURGERY     • TUBAL LIGATION       Family History   Problem Relation Age of Onset   • Heart disease Mother    • Diabetes type II Mother    • Hypertension Mother    • Heart disease Sister    • Diabetes type II Sister    • Diabetes type II Brother    • Diabetes type II Family      Social History     Socioeconomic History   • Marital status:      Spouse name: None   • Number of children: None   • Years of education: None   • Highest education level: None   Occupational History   • Occupation: RETIRED   Tobacco Use   • Smoking status: Never   • Smokeless tobacco: Never   Substance and Sexual Activity   • Alcohol use: No   • Drug use: Never   • Sexual activity: Not Currently   Other Topics Concern   • None   Social History Narrative    [employer]ILY [employer]     Social Determinants of Health     Financial Resource Strain: Low Risk  (2/1/2023)    Overall Financial Resource Strain (CARDIA)    • Difficulty of Paying Living Expenses: Not very hard   Food Insecurity: Not on file   Transportation Needs: No Transportation Needs (2/1/2023)    PRAPARE - Transportation    • Lack of Transportation (Medical): No    • Lack of Transportation (Non-Medical): No   Physical Activity: Not on file   Stress: Not on file   Social Connections: Not on file   Intimate Partner Violence: Not on file   Housing Stability: Not on file     Current Outpatient Medications on File Prior to Visit   Medication Sig   • amLODIPine (NORVASC) 5 mg tablet Take 1 tablet (5 mg total) by mouth daily   • Ascorbic Acid (VITAMIN C) 500 MG CAPS Take 1 tablet by mouth daily   • diphenhydrAMINE (BENADRYL) 2 % cream Apply topically 3 (three) times a day as needed for itching   • glimepiride (AMARYL) 4 mg tablet Take 1 tablet (4 mg total) by mouth daily with breakfast   • hydrochlorothiazide (HYDRODIURIL) 25 mg tablet Take 1 tablet (25 mg total) by mouth daily   • lisinopril (ZESTRIL) 40 mg tablet Take 1 tablet (40 mg total) by mouth daily   • metoprolol succinate (TOPROL-XL) 100 mg 24 hr tablet Take 1 tablet (100 mg total) by mouth daily     Allergies   Allergen Reactions   • Jardiance [Empagliflozin]    • Prandin [Repaglinide] Edema   • Sulfa Antibiotics Other (See Comments)     Immunization History   Administered Date(s) Administered   • Tdap 06/04/2008, 01/19/2015       Objective     /60 (BP Location: Left arm, Patient Position: Sitting, Cuff Size: Large)   Pulse 72   Temp (!) 96.2 °F (35.7 °C) (Tympanic)   Ht 4' 11" (1.499 m)   Wt 93.2 kg (205 lb 6.4 oz)   SpO2 97%   BMI 41.49 kg/m²     Physical Exam  Vitals reviewed. Constitutional:       Appearance: She is well-developed. She is not diaphoretic. HENT:      Head: Normocephalic and atraumatic.       Right Ear: Tympanic membrane, ear canal and external ear normal.      Left Ear: Tympanic membrane, ear canal and external ear normal.      Mouth/Throat:      Mouth: Mucous membranes are moist.   Eyes:      Extraocular Movements: Extraocular movements intact. Conjunctiva/sclera: Conjunctivae normal.      Pupils: Pupils are equal, round, and reactive to light. Neck:      Thyroid: No thyromegaly. Vascular: No JVD. Cardiovascular:      Rate and Rhythm: Normal rate and regular rhythm. Pulses:           Dorsalis pedis pulses are 2+ on the right side and 2+ on the left side. Heart sounds: No murmur heard. Pulmonary:      Effort: Pulmonary effort is normal.      Breath sounds: Normal breath sounds. Abdominal:      General: There is no distension. Palpations: Abdomen is soft. There is no mass. Tenderness: There is no abdominal tenderness. Musculoskeletal:         General: Tenderness (over low paralumbar muscles and SI joints) present. No swelling. Normal range of motion. Cervical back: Normal range of motion and neck supple. No tenderness. No muscular tenderness. Feet:      Right foot:      Skin integrity: No ulcer, skin breakdown, erythema, warmth, callus or dry skin. Left foot:      Skin integrity: No ulcer, skin breakdown, erythema, warmth, callus or dry skin. Lymphadenopathy:      Cervical: No cervical adenopathy. Skin:     General: Skin is warm and dry. Capillary Refill: Capillary refill takes less than 2 seconds. Findings: Lesion (eczematous dermatitis over mid back) present. Neurological:      Mental Status: She is alert and oriented to person, place, and time. Cranial Nerves: No cranial nerve deficit. Sensory: No sensory deficit. Motor: No weakness or abnormal muscle tone. Gait: Gait abnormal (antalgic appearing gait - ambulates with cane). Deep Tendon Reflexes: Reflexes are normal and symmetric. Psychiatric:         Mood and Affect: Mood normal.         Behavior: Behavior normal.         Thought Content:  Thought content normal.         Judgment: Judgment normal.       Rosaura Esposito MD

## 2023-08-06 PROBLEM — L30.9 ECZEMA: Status: ACTIVE | Noted: 2023-08-06

## 2023-08-06 NOTE — ASSESSMENT & PLAN NOTE
Lab Results   Component Value Date    HGBA1C 9.9 (H) 07/18/2023   Poor control. Refuses any injectable meds (insluin, GLP1 agonists), and has refused meds whenever the associated documentation suggests a side effect that she finds unacceptable. Pt understands the risks of poorly controlled DMII. Pt given samples of Tradjenta 5mg qd.  Recheck 3m

## 2023-08-06 NOTE — ASSESSMENT & PLAN NOTE
Focal area on back. Cannot r/o other cause. REC: trial of triamcinolone cream as directed.  Recheck 2w if not improved - earlier if worse

## 2023-08-17 DIAGNOSIS — E11.9 TYPE 2 DIABETES MELLITUS WITHOUT COMPLICATION, UNSPECIFIED WHETHER LONG TERM INSULIN USE (HCC): ICD-10-CM

## 2023-08-17 RX ORDER — GLIMEPIRIDE 4 MG/1
4 TABLET ORAL
Qty: 90 TABLET | Refills: 1 | Status: SHIPPED | OUTPATIENT
Start: 2023-08-17

## 2023-10-17 ENCOUNTER — APPOINTMENT (OUTPATIENT)
Dept: LAB | Facility: CLINIC | Age: 83
End: 2023-10-17
Payer: COMMERCIAL

## 2023-10-17 DIAGNOSIS — N18.30 TYPE 2 DIABETES MELLITUS WITH STAGE 3 CHRONIC KIDNEY DISEASE, WITHOUT LONG-TERM CURRENT USE OF INSULIN, UNSPECIFIED WHETHER STAGE 3A OR 3B CKD (HCC): ICD-10-CM

## 2023-10-17 DIAGNOSIS — E11.22 TYPE 2 DIABETES MELLITUS WITH STAGE 3 CHRONIC KIDNEY DISEASE, WITHOUT LONG-TERM CURRENT USE OF INSULIN, UNSPECIFIED WHETHER STAGE 3A OR 3B CKD (HCC): ICD-10-CM

## 2023-10-17 LAB
ANION GAP SERPL CALCULATED.3IONS-SCNC: 9 MMOL/L
BUN SERPL-MCNC: 18 MG/DL (ref 5–25)
CALCIUM SERPL-MCNC: 9.2 MG/DL (ref 8.4–10.2)
CHLORIDE SERPL-SCNC: 98 MMOL/L (ref 96–108)
CO2 SERPL-SCNC: 26 MMOL/L (ref 21–32)
CREAT SERPL-MCNC: 0.89 MG/DL (ref 0.6–1.3)
EST. AVERAGE GLUCOSE BLD GHB EST-MCNC: 286 MG/DL
GFR SERPL CREATININE-BSD FRML MDRD: 60 ML/MIN/1.73SQ M
GLUCOSE P FAST SERPL-MCNC: 262 MG/DL (ref 65–99)
HBA1C MFR BLD: 11.6 %
POTASSIUM SERPL-SCNC: 4.5 MMOL/L (ref 3.5–5.3)
SODIUM SERPL-SCNC: 133 MMOL/L (ref 135–147)

## 2023-10-17 PROCEDURE — 80048 BASIC METABOLIC PNL TOTAL CA: CPT

## 2023-10-17 PROCEDURE — 36415 COLL VENOUS BLD VENIPUNCTURE: CPT

## 2023-10-17 PROCEDURE — 83036 HEMOGLOBIN GLYCOSYLATED A1C: CPT

## 2023-12-11 ENCOUNTER — NURSE TRIAGE (OUTPATIENT)
Age: 83
End: 2023-12-11

## 2023-12-11 NOTE — TELEPHONE ENCOUNTER
Reason for Disposition  • Mild abdominal pain    Answer Assessment - Initial Assessment Questions  1. LOCATION: "Where does it hurt?"       RUQ  2. RADIATION: "Does the pain shoot anywhere else?" (e.g., chest, back)      denies  3. ONSET: "When did the pain begin?" (e.g., minutes, hours or days ago)       Saturday  4. SUDDEN: "Gradual or sudden onset?"      gradual  5. PATTERN "Does the pain come and go, or is it constant?"     - If constant: "Is it getting better, staying the same, or worsening?"       (Note: Constant means the pain never goes away completely; most serious pain is constant and it progresses)      - If intermittent: "How long does it last?" "Do you have pain now?"      (Note: Intermittent means the pain goes away completely between bouts)      intermittent  6. SEVERITY: "How bad is the pain?"  (e.g., Scale 1-10; mild, moderate, or severe)     - MILD (1-3): doesn't interfere with normal activities, abdomen soft and not tender to touch      - MODERATE (4-7): interferes with normal activities or awakens from sleep, tender to touch      - SEVERE (8-10): excruciating pain, doubled over, unable to do any normal activities        moderate  7. RECURRENT SYMPTOM: "Have you ever had this type of stomach pain before?" If Yes, ask: "When was the last time?" and "What happened that time?"       denies  8. AGGRAVATING FACTORS: "Does anything seem to cause this pain?" (e.g., foods, stress, alcohol)      unknown  9. CARDIAC SYMPTOMS: "Do you have any of the following symptoms: chest pain, difficulty breathing, sweating, nausea?"      Stent placement   10.  OTHER SYMPTOMS: "Do you have any other symptoms?" (e.g., fever, vomiting, diarrhea)        Loose stools    Protocols used: Abdominal Pain - Upper-ADULT-[place]

## 2023-12-11 NOTE — TELEPHONE ENCOUNTER
Regarding: diarrhea/abdominal pain  ----- Message from Camelia Saab sent at 12/11/2023 10:22 AM EST -----  The patient called she has been sick a couple of days she has diarrhea and abdominal pain     she had vomiting the other day    she is on the schedule for today   for a 3 month follow up and she wanted to cancel I left the appointment in until she talks with someone

## 2023-12-26 ENCOUNTER — APPOINTMENT (EMERGENCY)
Dept: CT IMAGING | Facility: HOSPITAL | Age: 83
End: 2023-12-26
Payer: COMMERCIAL

## 2023-12-26 ENCOUNTER — HOSPITAL ENCOUNTER (EMERGENCY)
Facility: HOSPITAL | Age: 83
Discharge: HOME | End: 2023-12-27
Attending: EMERGENCY MEDICINE
Payer: COMMERCIAL

## 2023-12-26 VITALS
OXYGEN SATURATION: 98 % | RESPIRATION RATE: 18 BRPM | SYSTOLIC BLOOD PRESSURE: 129 MMHG | TEMPERATURE: 98.6 F | HEART RATE: 69 BPM | DIASTOLIC BLOOD PRESSURE: 59 MMHG

## 2023-12-26 DIAGNOSIS — N17.9 AKI (ACUTE KIDNEY INJURY) (HCC): ICD-10-CM

## 2023-12-26 DIAGNOSIS — R74.8 ELEVATED LIPASE: ICD-10-CM

## 2023-12-26 DIAGNOSIS — R93.89 ABNORMAL FINDING ON CT SCAN: ICD-10-CM

## 2023-12-26 DIAGNOSIS — R11.2 NAUSEA AND VOMITING: ICD-10-CM

## 2023-12-26 DIAGNOSIS — R10.9 ABDOMINAL PAIN: Primary | ICD-10-CM

## 2023-12-26 LAB
ALBUMIN SERPL BCP-MCNC: 3.6 G/DL (ref 3.5–5)
ALP SERPL-CCNC: 111 U/L (ref 34–104)
ALT SERPL W P-5'-P-CCNC: 14 U/L (ref 7–52)
ANION GAP SERPL CALCULATED.3IONS-SCNC: 12 MMOL/L
AST SERPL W P-5'-P-CCNC: 21 U/L (ref 13–39)
BASOPHILS # BLD AUTO: 0.03 THOUSANDS/ÂΜL (ref 0–0.1)
BASOPHILS NFR BLD AUTO: 0 % (ref 0–1)
BILIRUB SERPL-MCNC: 0.56 MG/DL (ref 0.2–1)
BUN SERPL-MCNC: 29 MG/DL (ref 5–25)
CALCIUM SERPL-MCNC: 9.1 MG/DL (ref 8.4–10.2)
CARDIAC TROPONIN I PNL SERPL HS: 13 NG/L
CHLORIDE SERPL-SCNC: 90 MMOL/L (ref 96–108)
CO2 SERPL-SCNC: 26 MMOL/L (ref 21–32)
CREAT SERPL-MCNC: 1.31 MG/DL (ref 0.6–1.3)
EOSINOPHIL # BLD AUTO: 0.14 THOUSAND/ÂΜL (ref 0–0.61)
EOSINOPHIL NFR BLD AUTO: 2 % (ref 0–6)
ERYTHROCYTE [DISTWIDTH] IN BLOOD BY AUTOMATED COUNT: 11.9 % (ref 11.6–15.1)
FLUAV RNA RESP QL NAA+PROBE: NEGATIVE
FLUBV RNA RESP QL NAA+PROBE: NEGATIVE
GFR SERPL CREATININE-BSD FRML MDRD: 38 ML/MIN/1.73SQ M
GLUCOSE SERPL-MCNC: 423 MG/DL (ref 65–140)
HCT VFR BLD AUTO: 38.5 % (ref 34.8–46.1)
HGB BLD-MCNC: 12.4 G/DL (ref 11.5–15.4)
IMM GRANULOCYTES # BLD AUTO: 0.02 THOUSAND/UL (ref 0–0.2)
IMM GRANULOCYTES NFR BLD AUTO: 0 % (ref 0–2)
LIPASE SERPL-CCNC: 138 U/L (ref 11–82)
LYMPHOCYTES # BLD AUTO: 2.79 THOUSANDS/ÂΜL (ref 0.6–4.47)
LYMPHOCYTES NFR BLD AUTO: 31 % (ref 14–44)
MCH RBC QN AUTO: 29 PG (ref 26.8–34.3)
MCHC RBC AUTO-ENTMCNC: 32.2 G/DL (ref 31.4–37.4)
MCV RBC AUTO: 90 FL (ref 82–98)
MONOCYTES # BLD AUTO: 0.75 THOUSAND/ÂΜL (ref 0.17–1.22)
MONOCYTES NFR BLD AUTO: 8 % (ref 4–12)
NEUTROPHILS # BLD AUTO: 5.16 THOUSANDS/ÂΜL (ref 1.85–7.62)
NEUTS SEG NFR BLD AUTO: 59 % (ref 43–75)
NRBC BLD AUTO-RTO: 0 /100 WBCS
PLATELET # BLD AUTO: 205 THOUSANDS/UL (ref 149–390)
PMV BLD AUTO: 11.7 FL (ref 8.9–12.7)
POTASSIUM SERPL-SCNC: 3.7 MMOL/L (ref 3.5–5.3)
PROT SERPL-MCNC: 6.7 G/DL (ref 6.4–8.4)
RBC # BLD AUTO: 4.27 MILLION/UL (ref 3.81–5.12)
RSV RNA RESP QL NAA+PROBE: NEGATIVE
SARS-COV-2 RNA RESP QL NAA+PROBE: NEGATIVE
SODIUM SERPL-SCNC: 128 MMOL/L (ref 135–147)
WBC # BLD AUTO: 8.89 THOUSAND/UL (ref 4.31–10.16)

## 2023-12-26 PROCEDURE — G1004 CDSM NDSC: HCPCS

## 2023-12-26 PROCEDURE — 74176 CT ABD & PELVIS W/O CONTRAST: CPT

## 2023-12-26 PROCEDURE — 96360 HYDRATION IV INFUSION INIT: CPT

## 2023-12-26 PROCEDURE — 36415 COLL VENOUS BLD VENIPUNCTURE: CPT | Performed by: EMERGENCY MEDICINE

## 2023-12-26 PROCEDURE — 0241U HB NFCT DS VIR RESP RNA 4 TRGT: CPT | Performed by: EMERGENCY MEDICINE

## 2023-12-26 PROCEDURE — 99285 EMERGENCY DEPT VISIT HI MDM: CPT | Performed by: EMERGENCY MEDICINE

## 2023-12-26 PROCEDURE — 83690 ASSAY OF LIPASE: CPT | Performed by: EMERGENCY MEDICINE

## 2023-12-26 PROCEDURE — 93005 ELECTROCARDIOGRAM TRACING: CPT

## 2023-12-26 PROCEDURE — 85025 COMPLETE CBC W/AUTO DIFF WBC: CPT | Performed by: EMERGENCY MEDICINE

## 2023-12-26 PROCEDURE — 96361 HYDRATE IV INFUSION ADD-ON: CPT

## 2023-12-26 PROCEDURE — 99285 EMERGENCY DEPT VISIT HI MDM: CPT

## 2023-12-26 PROCEDURE — 80053 COMPREHEN METABOLIC PANEL: CPT | Performed by: EMERGENCY MEDICINE

## 2023-12-26 PROCEDURE — 84484 ASSAY OF TROPONIN QUANT: CPT | Performed by: EMERGENCY MEDICINE

## 2023-12-26 RX ORDER — ACETAMINOPHEN 325 MG/1
975 TABLET ORAL ONCE
Status: COMPLETED | OUTPATIENT
Start: 2023-12-26 | End: 2023-12-26

## 2023-12-26 RX ADMIN — ACETAMINOPHEN 975 MG: 325 TABLET ORAL at 23:20

## 2023-12-26 RX ADMIN — SODIUM CHLORIDE 1000 ML: 0.9 INJECTION, SOLUTION INTRAVENOUS at 22:55

## 2023-12-27 ENCOUNTER — TELEPHONE (OUTPATIENT)
Age: 83
End: 2023-12-27

## 2023-12-27 DIAGNOSIS — K86.89 MASS OF HEAD OF PANCREAS: Primary | ICD-10-CM

## 2023-12-27 LAB
2HR DELTA HS TROPONIN: 1 NG/L
ATRIAL RATE: 64 BPM
ATRIAL RATE: 66 BPM
CARDIAC TROPONIN I PNL SERPL HS: 14 NG/L
P AXIS: 53 DEGREES
P AXIS: 9 DEGREES
PR INTERVAL: 144 MS
PR INTERVAL: 148 MS
QRS AXIS: -21 DEGREES
QRS AXIS: -23 DEGREES
QRSD INTERVAL: 94 MS
QRSD INTERVAL: 96 MS
QT INTERVAL: 424 MS
QT INTERVAL: 424 MS
QTC INTERVAL: 437 MS
QTC INTERVAL: 444 MS
T WAVE AXIS: 63 DEGREES
T WAVE AXIS: 85 DEGREES
VENTRICULAR RATE: 64 BPM
VENTRICULAR RATE: 66 BPM

## 2023-12-27 PROCEDURE — 84484 ASSAY OF TROPONIN QUANT: CPT | Performed by: EMERGENCY MEDICINE

## 2023-12-27 PROCEDURE — 96361 HYDRATE IV INFUSION ADD-ON: CPT

## 2023-12-27 PROCEDURE — 93005 ELECTROCARDIOGRAM TRACING: CPT

## 2023-12-27 PROCEDURE — 36415 COLL VENOUS BLD VENIPUNCTURE: CPT | Performed by: EMERGENCY MEDICINE

## 2023-12-27 RX ORDER — OXYCODONE HYDROCHLORIDE 5 MG/1
5 TABLET ORAL EVERY 6 HOURS PRN
Qty: 12 TABLET | Refills: 0 | Status: SHIPPED | OUTPATIENT
Start: 2023-12-27 | End: 2023-12-30

## 2023-12-27 RX ORDER — ONDANSETRON 4 MG/1
4 TABLET, ORALLY DISINTEGRATING ORAL EVERY 6 HOURS PRN
Qty: 20 TABLET | Refills: 0 | Status: ON HOLD | OUTPATIENT
Start: 2023-12-27 | End: 2024-01-06

## 2023-12-27 RX ADMIN — Medication 2.5 MG: at 02:00

## 2023-12-27 NOTE — DISCHARGE INSTRUCTIONS
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 Alternatively findings may reflect acute pancreatitis. Contrast-enhanced abdominal MRI recommended for further evaluation.

## 2023-12-27 NOTE — ED PROVIDER NOTES
History  Chief Complaint   Patient presents with    Abdominal Pain     Going on for a couple of weeks; cough; mucus, vomiting last week; decreased appetite; upper bilateral abdominal pain     82 yo female presenting with abdominal cramping, dizziness with standing for about 1.5 weeks, with worsening in symptoms. Reports vomiting last week. Last time she threw up was about 4-5 days ago. Belly pain comes and goes and seems to come on worse with drinking some thing cold. Have not been eating because of the discomfort has been able to keep down ramen noodles but everything else seems to puke up. Had loose bowels but no BM today. No urinary symptoms.         Prior to Admission Medications   Prescriptions Last Dose Informant Patient Reported? Taking?   Ascorbic Acid (VITAMIN C) 500 MG CAPS  Self Yes No   Sig: Take 1 tablet by mouth daily   amLODIPine (NORVASC) 5 mg tablet   No No   Sig: Take 1 tablet (5 mg total) by mouth daily   diphenhydrAMINE (BENADRYL) 2 % cream   No No   Sig: Apply topically 3 (three) times a day as needed for itching   glimepiride (AMARYL) 4 mg tablet   No No   Sig: Take 1 tablet (4 mg total) by mouth daily with breakfast   hydrochlorothiazide (HYDRODIURIL) 25 mg tablet  Self No No   Sig: Take 1 tablet (25 mg total) by mouth daily   linaGLIPtin 5 MG TABS   No No   Sig: Take 5 mg by mouth daily with or without food   lisinopril (ZESTRIL) 40 mg tablet   No No   Sig: Take 1 tablet (40 mg total) by mouth daily   metFORMIN (GLUCOPHAGE) 1000 MG tablet   No No   Sig: Take 1 tablet (1,000 mg total) by mouth 2 (two) times a day with meals   metoprolol succinate (TOPROL-XL) 100 mg 24 hr tablet  Self No No   Sig: Take 1 tablet (100 mg total) by mouth daily   triamcinolone (KENALOG) 0.1 % cream   No No   Sig: Apply topically 2 (two) times a day      Facility-Administered Medications: None       Past Medical History:   Diagnosis Date    Bereavement, uncomplicated     [id number]  RESOLVED    Coronary artery  disease     Diabetes mellitus (HCC)     Hyperlipidemia     Hypertension     Lymphedema     Morbid obesity (HCC)     Myocardial infarction (HCC)        Past Surgical History:   Procedure Laterality Date    CARDIAC CATHETERIZATION      HIP SURGERY      TUBAL LIGATION         Family History   Problem Relation Age of Onset    Heart disease Mother     Diabetes type II Mother     Hypertension Mother     Heart disease Sister     Diabetes type II Sister     Diabetes type II Brother     Diabetes type II Family      I have reviewed and agree with the history as documented.    E-Cigarette/Vaping    E-Cigarette Use Never User      E-Cigarette/Vaping Substances    Nicotine No     THC No     CBD No     Flavoring No     Other No     Unknown No      Social History     Tobacco Use    Smoking status: Never    Smokeless tobacco: Never   Vaping Use    Vaping status: Never Used   Substance Use Topics    Alcohol use: No    Drug use: Never       Review of Systems   Respiratory:  Positive for cough.    Gastrointestinal:  Positive for abdominal pain.   Neurological:  Positive for dizziness.   All other systems reviewed and are negative.      Physical Exam  Physical Exam  Vitals and nursing note reviewed.   Constitutional:       General: She is not in acute distress.     Appearance: She is well-developed. She is not diaphoretic.   HENT:      Head: Normocephalic and atraumatic.      Right Ear: External ear normal.      Left Ear: External ear normal.      Nose: Nose normal.   Eyes:      General: No scleral icterus.        Right eye: No discharge.         Left eye: No discharge.      Conjunctiva/sclera: Conjunctivae normal.      Pupils: Pupils are equal, round, and reactive to light.   Neck:      Vascular: No JVD.      Trachea: No tracheal deviation.   Cardiovascular:      Rate and Rhythm: Normal rate and regular rhythm.      Heart sounds: Normal heart sounds. No murmur heard.     No friction rub. No gallop.   Pulmonary:      Effort: Pulmonary  effort is normal. No respiratory distress.      Breath sounds: Normal breath sounds. No stridor. No wheezing or rales.   Abdominal:      General: Bowel sounds are normal. There is no distension.      Palpations: Abdomen is soft. There is no mass.      Tenderness: There is abdominal tenderness in the right upper quadrant and epigastric area. There is no guarding.   Musculoskeletal:         General: No tenderness or deformity. Normal range of motion.      Cervical back: Normal range of motion and neck supple.   Skin:     General: Skin is warm and dry.      Coloration: Skin is not cyanotic, jaundiced, mottled or pale.      Findings: No erythema or rash.   Neurological:      Mental Status: She is alert and oriented to person, place, and time.      Cranial Nerves: No cranial nerve deficit.      Sensory: No sensory deficit.      Motor: No abnormal muscle tone.      Comments: 5/5 strength x 4 extremities  Gross sensation intact  CN intact  GCS 15       Psychiatric:         Behavior: Behavior normal.         Thought Content: Thought content normal.         Judgment: Judgment normal.         Vital Signs  ED Triage Vitals   Temperature Pulse Respirations Blood Pressure SpO2   12/26/23 2212 12/26/23 2212 12/26/23 2212 12/26/23 2212 12/26/23 2212   98.6 °F (37 °C) 69 18 129/59 98 %      Temp src Heart Rate Source Patient Position - Orthostatic VS BP Location FiO2 (%)   -- 12/26/23 2212 -- -- --    Monitor         Pain Score       12/26/23 2320       3           Vitals:    12/26/23 2212   BP: 129/59   Pulse: 69         Visual Acuity      ED Medications  Medications   oxyCODONE (ROXICODONE) split tablet 2.5 mg (has no administration in time range)   acetaminophen (TYLENOL) tablet 975 mg (975 mg Oral Given 12/26/23 2320)   sodium chloride 0.9 % bolus 1,000 mL (1,000 mL Intravenous New Bag 12/26/23 2255)       Diagnostic Studies  Results Reviewed       Procedure Component Value Units Date/Time    HS Troponin I 2hr [[id number]]   (Normal) Collected: 12/27/23 0058    Lab Status: Final result Specimen: Blood from Arm, Right Updated: 12/27/23 0134     hs TnI 2hr 14 ng/L      Delta 2hr hsTnI 1 ng/L     HS Troponin I 4hr [[id number]]     Lab Status: No result Specimen: Blood     FLU/RSV/COVID - if FLU/RSV clinically relevant [[id number]]  (Normal) Collected: 12/26/23 2300    Lab Status: Final result Specimen: Nares from Nasopharyngeal Swab Updated: 12/26/23 2340     SARS-CoV-2 Negative     INFLUENZA A PCR Negative     INFLUENZA B PCR Negative     RSV PCR Negative    Narrative:      FOR PEDIATRIC PATIENTS - copy/paste COVID Guidelines URL to browser: [email or web address]    SARS-CoV-2 assay is a Nucleic Acid Amplification assay intended for the  qualitative detection of nucleic acid from SARS-CoV-2 in nasopharyngeal  swabs. Results are for the presumptive identification of SARS-CoV-2 RNA.    Positive results are indicative of infection with SARS-CoV-2, the virus  causing COVID-19, but do not rule out bacterial infection or co-infection  with other viruses. Laboratories within the [place] and its  territories are required to report all positive results to the appropriate  public health authorities. Negative results do not preclude SARS-CoV-2  infection and should not be used as the sole basis for treatment or other  patient management decisions. Negative results must be combined with  clinical observations, patient history, and epidemiological information.  This test has not been FDA cleared or approved.    This test has been authorized by FDA under an Emergency Use Authorization  (EUA). This test is only authorized for the duration of time the  declaration that circumstances exist justifying the authorization of the  emergency use of an in vitro diagnostic tests for detection of SARS-CoV-2  virus and/or diagnosis of COVID-19 infection under section 564(b)(1) of  the Act, 21 U.S.C.  360bbb-3(b)(1), unless the authorization is terminated  or revoked sooner. The test has been validated but independent review by FDA  and CLIA is pending.    Test performed using [employer]: This RT-PCR assay targets N2,  a region unique to SARS-CoV-2. A conserved region in the E-gene was chosen  for pan-Sarbecovirus detection which includes SARS-CoV-2.    According to CMS-2020-01-R, this platform meets the definition of high-throughput technology.    HS Troponin 0hr (reflex protocol) [[id number]]  (Normal) Collected: 12/26/23 2255    Lab Status: Final result Specimen: Blood from Arm, Right Updated: 12/26/23 2327     hs TnI 0hr 13 ng/L     Comprehensive metabolic panel [[id number]]  (Abnormal) Collected: 12/26/23 2255    Lab Status: Final result Specimen: Blood from Arm, Right Updated: 12/26/23 2323     Sodium 128 mmol/L      Potassium 3.7 mmol/L      Chloride 90 mmol/L      CO2 26 mmol/L      ANION GAP 12 mmol/L      BUN 29 mg/dL      Creatinine 1.31 mg/dL      Glucose 423 mg/dL      Calcium 9.1 mg/dL      AST 21 U/L      ALT 14 U/L      Alkaline Phosphatase 111 U/L      Total Protein 6.7 g/dL      Albumin 3.6 g/dL      Total Bilirubin 0.56 mg/dL      eGFR 38 ml/min/1.73sq m     Narrative:      National Kidney Disease Foundation guidelines for Chronic Kidney Disease (CKD):     Stage 1 with normal or high GFR (GFR > 90 mL/min/1.73 square meters)    Stage 2 Mild CKD (GFR = 60-89 mL/min/1.73 square meters)    Stage 3A Moderate CKD (GFR = 45-59 mL/min/1.73 square meters)    Stage 3B Moderate CKD (GFR = 30-44 mL/min/1.73 square meters)    Stage 4 Severe CKD (GFR = 15-29 mL/min/1.73 square meters)    Stage 5 End Stage CKD (GFR <15 mL/min/1.73 square meters)  Note: GFR calculation is accurate only with a steady state creatinine    Lipase [[id number]]  (Abnormal) Collected: 12/26/23 2255    Lab Status: Final result Specimen: Blood from Arm, Right Updated: 12/26/23 2323     Lipase 138 u/L     CBC and differential  [[id number]] Collected: 12/26/23 2255    Lab Status: Final result Specimen: Blood from Arm, Right Updated: 12/26/23 2300     WBC 8.89 Thousand/uL      RBC 4.27 Million/uL      Hemoglobin 12.4 g/dL      Hematocrit 38.5 %      MCV 90 fL      MCH 29.0 pg      MCHC 32.2 g/dL      RDW 11.9 %      MPV 11.7 fL      Platelets 205 Thousands/uL      nRBC 0 /100 WBCs      Neutrophils Relative 59 %      Immat GRANS % 0 %      Lymphocytes Relative 31 %      Monocytes Relative 8 %      Eosinophils Relative 2 %      Basophils Relative 0 %      Neutrophils Absolute 5.16 Thousands/µL      Immature Grans Absolute 0.02 Thousand/uL      Lymphocytes Absolute 2.79 Thousands/µL      Monocytes Absolute 0.75 Thousand/µL      Eosinophils Absolute 0.14 Thousand/µL      Basophils Absolute 0.03 Thousands/µL                    CT abdomen pelvis wo contrast   ED Interpretation by Richard Dickens DO (12/27 0019)   Abnormal   Edema and stranding near pancreas, ?pancreatitis?      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 Alternatively findings may reflect acute pancreatitis. Contrast-enhanced abdominal MRI recommended for further evaluation.            Workstation performed: [id number]                    Procedures  ECG 12 Lead Documentation Only    Date/Time: 12/26/2023 11:17 PM    Performed by: Richard Dickens DO  Authorized by: Richard Dickens DO    Interpretation:     Interpretation: normal    Rate:     ECG rate:  66    ECG rate assessment: normal    Rhythm:     Rhythm: sinus rhythm    Ectopy:     Ectopy: none    QRS:     QRS axis:  Normal    QRS intervals:  Normal  Conduction:     Conduction: normal    ST segments:     ST segments:  Normal  T waves:     T waves: normal             ED Course  ED Course as of 12/27/23 0159   Tue Dec 26, 2023   2302 Patient only requesting tylenol for pain at this time.  Declining nausea medicaitons   2327 Sodium(!): 128  With correcting for sodium Na likely 132   2328 Creatinine(!): 1.31  Giving IV fluids   Wed Dec 27, 2023   0114 Reviewed all blood work and CT scan findings with patient and patient's son at bedside with patient's nurse Leah Puckett at bedside.  Explained to them that there is concern for the possibility of pancreatic cancer with metastasis versus pancreatitis but that we are unable to tell that scan alone and that she would require an MRI.  Patient also noted to have a DUC which was discussed with patient and son.  Multiple times offered patient admission and patient is awake alert and oriented and understands all the risks of going home at this time states she would however prefer to go home and will follow-up with her primary care physician immediately in the morning for MRI.  Discussed with patient and son that it may take weeks to get an MRI as an outpatient where as an inpatient we would likely be able to get much faster.  Patient again states her understanding of this but that she would like to go home and will not be staying in the hospital at this time.                                             Medical Decision Making  83-year-old female with persistent abdominal discomfort described as cramps which come and go along with intermittent dizziness and coughing for the last few weeks.  States is gotten worse.  Will check blood work, EKG, troponin, CT abdomen and pelvis give gentle fluid hydration as reportedly symptoms worse with standing.  Will give Tylenol.    Amount and/or Complexity of Data Reviewed  Labs: ordered. Decision-making details documented in ED Course.  Radiology: ordered and independent interpretation performed.    Risk  OTC drugs.  Prescription drug management.             Disposition  Final diagnoses:   Abdominal pain   Abnormal finding on CT scan   Nausea and vomiting   Elevated lipase   DUC (acute kidney injury) (HCC)     Time  reflects when diagnosis was documented in both MDM as applicable and the Disposition within this note       Time User Action Codes Description Comment    12/27/2023  1:46 AM Richard Dickens Add [R10.9] Abdominal pain     12/27/2023  1:46 AM Richard Dickens Add [R93.89] Abnormal finding on CT scan     12/27/2023  1:47 AM Richard Dickens Add [R11.2] Nausea and vomiting     12/27/2023  1:47 AM Richard Dickens Add [R74.8] Elevated lipase     12/27/2023  1:47 AM Richard Dickens Add [N17.9] DUC (acute kidney injury) (HCC)           ED Disposition       ED Disposition   Discharge    Condition   Stable    Date/Time   Wed Dec 27, 2023  1:46 AM    Comment   Candi Carpenter discharge to home/self care.                   Follow-up Information       Follow up With Specialties Details Why Contact Info Additional Information    Ming Paredes MD Family Medicine   [address] [place] [address].  [address]  [facility] [id number] [phone]       [facility] Emergency Medicine Go to  As needed, If symptoms worsen [address] [facility] [id number]  [phone] [facility], [address] [address]            Patient's Medications   Discharge Prescriptions    ONDANSETRON (ZOFRAN-ODT) 4 MG DISINTEGRATING TABLET    Take 1 tablet (4 mg total) by mouth every 6 (six) hours as needed for nausea or vomiting for up to 5 days       Start Date: 12/27/2023End Date: 1/1/2024       Order Dose: 4 mg       Quantity: 20 tablet    Refills: 0    OXYCODONE (ROXICODONE) 5 IMMEDIATE RELEASE TABLET    Take 1 tablet (5 mg total) by mouth every 6 (six) hours as needed for moderate pain for up to 3 days Max Daily Amount: 20 mg       Start Date: 12/27/2023End Date: 12/30/2023       Order Dose: 5 mg       Quantity: 12 tablet    Refills: 0       No discharge procedures on file.    PDMP Review       None            ED Provider  Electronically  Signed by             Richard Dickens, DO  12/27/23 0159

## 2023-12-27 NOTE — TELEPHONE ENCOUNTER
PT requesting if Dr Pimentel could please give her a call. She said she was at the ED yesterday and needs to talk to you about it. She was offered to schedule an OV, but said she already has appointments scheduled.    Please f/u with PT.    Thanks

## 2023-12-27 NOTE — TELEPHONE ENCOUNTER
Spoke to patient. She is inquiring about the MRI that the ER provided wanted. Per our conversation since Dr. Pimentel is out of the office can you place order so patient can schedule.

## 2023-12-27 NOTE — TELEPHONE ENCOUNTER
Please call patient - I placed the order for MRI. She should call to schedule.     Routed to PCP for FYI.

## 2023-12-27 NOTE — ED NOTES
Pt updated with CT results by provider along with myself; Pt expressed multiple times that she did not want to stay in the hospital for further testing at this time; Provider expressed concerns and importance of outpatient follow-up ASAP; Pt is agreed to call primary doctor in AM to schedule further testing; Pt also expressed concerned for narcotic pain medicine for at home- myself reiterated the fact that these medications are only as needed and if she does not want to take them then she does not     Tania Puckett RN  12/27/23 0107       Tania Puckett RN  12/27/23 0110

## 2024-01-01 ENCOUNTER — HOME CARE VISIT (OUTPATIENT)
Dept: HOME HOSPICE | Facility: HOSPICE | Age: 84
End: 2024-01-01
Payer: MEDICARE

## 2024-01-01 ENCOUNTER — HOME CARE VISIT (OUTPATIENT)
Dept: HOME HEALTH SERVICES | Facility: HOME HEALTHCARE | Age: 84
End: 2024-01-01

## 2024-01-01 ENCOUNTER — HOME CARE VISIT (OUTPATIENT)
Dept: HOME HEALTH SERVICES | Facility: HOME HEALTHCARE | Age: 84
End: 2024-01-01
Payer: MEDICARE

## 2024-01-01 VITALS
TEMPERATURE: 98.2 F | RESPIRATION RATE: 22 BRPM | DIASTOLIC BLOOD PRESSURE: 72 MMHG | SYSTOLIC BLOOD PRESSURE: 138 MMHG | HEART RATE: 116 BPM

## 2024-01-01 VITALS — DIASTOLIC BLOOD PRESSURE: 78 MMHG | SYSTOLIC BLOOD PRESSURE: 142 MMHG

## 2024-01-01 PROCEDURE — G0299 HHS/HOSPICE OF RN EA 15 MIN: HCPCS

## 2024-01-01 PROCEDURE — G0156 HHCP-SVS OF AIDE,EA 15 MIN: HCPCS

## 2024-01-01 PROCEDURE — G0155 HHCP-SVS OF CSW,EA 15 MIN: HCPCS

## 2024-01-03 ENCOUNTER — HOSPITAL ENCOUNTER (INPATIENT)
Facility: HOSPITAL | Age: 84
LOS: 3 days | Discharge: HOME HEALTH | DRG: 394 | End: 2024-01-06
Attending: INTERNAL MEDICINE | Admitting: INTERNAL MEDICINE
Payer: COMMERCIAL

## 2024-01-03 ENCOUNTER — APPOINTMENT (EMERGENCY)
Dept: CT IMAGING | Facility: HOSPITAL | Age: 84
DRG: 394 | End: 2024-01-03
Payer: COMMERCIAL

## 2024-01-03 DIAGNOSIS — R11.2 NAUSEA AND VOMITING: ICD-10-CM

## 2024-01-03 DIAGNOSIS — K55.069 SUPERIOR MESENTERIC VEIN THROMBOSIS (HCC): ICD-10-CM

## 2024-01-03 DIAGNOSIS — K86.89 PANCREATIC MASS: ICD-10-CM

## 2024-01-03 DIAGNOSIS — N17.9 ACUTE KIDNEY INJURY (HCC): ICD-10-CM

## 2024-01-03 DIAGNOSIS — R10.9 ABDOMINAL PAIN: Primary | ICD-10-CM

## 2024-01-03 PROBLEM — L84 CALLUS: Status: ACTIVE | Noted: 2023-09-20

## 2024-01-03 PROBLEM — L60.3 TRACHYONYCHIA: Status: ACTIVE | Noted: 2023-09-20

## 2024-01-03 PROBLEM — B35.1 ONYCHOMYCOSIS OF TOENAIL: Status: ACTIVE | Noted: 2023-09-20

## 2024-01-03 LAB
2HR DELTA HS TROPONIN: 2 NG/L
ALBUMIN SERPL BCP-MCNC: 3.8 G/DL (ref 3.5–5)
ALP SERPL-CCNC: 141 U/L (ref 34–104)
ALT SERPL W P-5'-P-CCNC: 16 U/L (ref 7–52)
ANION GAP SERPL CALCULATED.3IONS-SCNC: 15 MMOL/L
APTT PPP: 28 SECONDS (ref 23–37)
AST SERPL W P-5'-P-CCNC: 25 U/L (ref 13–39)
BASOPHILS # BLD AUTO: 0.03 THOUSANDS/ÂΜL (ref 0–0.1)
BASOPHILS NFR BLD AUTO: 0 % (ref 0–1)
BILIRUB SERPL-MCNC: 0.6 MG/DL (ref 0.2–1)
BUN SERPL-MCNC: 26 MG/DL (ref 5–25)
CALCIUM SERPL-MCNC: 9.5 MG/DL (ref 8.4–10.2)
CARDIAC TROPONIN I PNL SERPL HS: 10 NG/L
CARDIAC TROPONIN I PNL SERPL HS: 12 NG/L
CHLORIDE SERPL-SCNC: 89 MMOL/L (ref 96–108)
CO2 SERPL-SCNC: 25 MMOL/L (ref 21–32)
CREAT SERPL-MCNC: 1.4 MG/DL (ref 0.6–1.3)
EOSINOPHIL # BLD AUTO: 0.07 THOUSAND/ÂΜL (ref 0–0.61)
EOSINOPHIL NFR BLD AUTO: 1 % (ref 0–6)
ERYTHROCYTE [DISTWIDTH] IN BLOOD BY AUTOMATED COUNT: 11.9 % (ref 11.6–15.1)
GFR SERPL CREATININE-BSD FRML MDRD: 35 ML/MIN/1.73SQ M
GLUCOSE SERPL-MCNC: 318 MG/DL (ref 65–140)
HCT VFR BLD AUTO: 39 % (ref 34.8–46.1)
HGB BLD-MCNC: 12.8 G/DL (ref 11.5–15.4)
IMM GRANULOCYTES # BLD AUTO: 0.02 THOUSAND/UL (ref 0–0.2)
IMM GRANULOCYTES NFR BLD AUTO: 0 % (ref 0–2)
INR PPP: 1.1 (ref 0.84–1.19)
LACTATE SERPL-SCNC: 1.5 MMOL/L (ref 0.5–2)
LACTATE SERPL-SCNC: 2.4 MMOL/L (ref 0.5–2)
LIPASE SERPL-CCNC: 249 U/L (ref 11–82)
LYMPHOCYTES # BLD AUTO: 2.03 THOUSANDS/ÂΜL (ref 0.6–4.47)
LYMPHOCYTES NFR BLD AUTO: 26 % (ref 14–44)
MCH RBC QN AUTO: 29.4 PG (ref 26.8–34.3)
MCHC RBC AUTO-ENTMCNC: 32.8 G/DL (ref 31.4–37.4)
MCV RBC AUTO: 90 FL (ref 82–98)
MONOCYTES # BLD AUTO: 0.59 THOUSAND/ÂΜL (ref 0.17–1.22)
MONOCYTES NFR BLD AUTO: 8 % (ref 4–12)
NEUTROPHILS # BLD AUTO: 5.01 THOUSANDS/ÂΜL (ref 1.85–7.62)
NEUTS SEG NFR BLD AUTO: 65 % (ref 43–75)
NRBC BLD AUTO-RTO: 0 /100 WBCS
PLATELET # BLD AUTO: 193 THOUSANDS/UL (ref 149–390)
PMV BLD AUTO: 10.9 FL (ref 8.9–12.7)
POTASSIUM SERPL-SCNC: 3.4 MMOL/L (ref 3.5–5.3)
PROCALCITONIN SERPL-MCNC: 0.21 NG/ML
PROT SERPL-MCNC: 6.9 G/DL (ref 6.4–8.4)
PROTHROMBIN TIME: 14.4 SECONDS (ref 11.6–14.5)
RBC # BLD AUTO: 4.35 MILLION/UL (ref 3.81–5.12)
SODIUM SERPL-SCNC: 129 MMOL/L (ref 135–147)
WBC # BLD AUTO: 7.75 THOUSAND/UL (ref 4.31–10.16)

## 2024-01-03 PROCEDURE — 36415 COLL VENOUS BLD VENIPUNCTURE: CPT | Performed by: INTERNAL MEDICINE

## 2024-01-03 PROCEDURE — 99285 EMERGENCY DEPT VISIT HI MDM: CPT

## 2024-01-03 PROCEDURE — 85025 COMPLETE CBC W/AUTO DIFF WBC: CPT | Performed by: INTERNAL MEDICINE

## 2024-01-03 PROCEDURE — 84145 PROCALCITONIN (PCT): CPT | Performed by: INTERNAL MEDICINE

## 2024-01-03 PROCEDURE — 96367 TX/PROPH/DG ADDL SEQ IV INF: CPT

## 2024-01-03 PROCEDURE — 87040 BLOOD CULTURE FOR BACTERIA: CPT | Performed by: INTERNAL MEDICINE

## 2024-01-03 PROCEDURE — 83690 ASSAY OF LIPASE: CPT | Performed by: INTERNAL MEDICINE

## 2024-01-03 PROCEDURE — G1004 CDSM NDSC: HCPCS

## 2024-01-03 PROCEDURE — 80053 COMPREHEN METABOLIC PANEL: CPT | Performed by: INTERNAL MEDICINE

## 2024-01-03 PROCEDURE — 93005 ELECTROCARDIOGRAM TRACING: CPT

## 2024-01-03 PROCEDURE — 96375 TX/PRO/DX INJ NEW DRUG ADDON: CPT

## 2024-01-03 PROCEDURE — 96366 THER/PROPH/DIAG IV INF ADDON: CPT

## 2024-01-03 PROCEDURE — 74176 CT ABD & PELVIS W/O CONTRAST: CPT

## 2024-01-03 PROCEDURE — 99285 EMERGENCY DEPT VISIT HI MDM: CPT | Performed by: INTERNAL MEDICINE

## 2024-01-03 PROCEDURE — 84484 ASSAY OF TROPONIN QUANT: CPT | Performed by: INTERNAL MEDICINE

## 2024-01-03 PROCEDURE — 83605 ASSAY OF LACTIC ACID: CPT | Performed by: INTERNAL MEDICINE

## 2024-01-03 PROCEDURE — 96365 THER/PROPH/DIAG IV INF INIT: CPT

## 2024-01-03 PROCEDURE — 85610 PROTHROMBIN TIME: CPT | Performed by: INTERNAL MEDICINE

## 2024-01-03 PROCEDURE — 85730 THROMBOPLASTIN TIME PARTIAL: CPT | Performed by: INTERNAL MEDICINE

## 2024-01-03 RX ORDER — ONDANSETRON 2 MG/ML
4 INJECTION INTRAMUSCULAR; INTRAVENOUS ONCE
Status: COMPLETED | OUTPATIENT
Start: 2024-01-03 | End: 2024-01-03

## 2024-01-03 RX ORDER — SODIUM CHLORIDE, SODIUM GLUCONATE, SODIUM ACETATE, POTASSIUM CHLORIDE, MAGNESIUM CHLORIDE, SODIUM PHOSPHATE, DIBASIC, AND POTASSIUM PHOSPHATE .53; .5; .37; .037; .03; .012; .00082 G/100ML; G/100ML; G/100ML; G/100ML; G/100ML; G/100ML; G/100ML
1000 INJECTION, SOLUTION INTRAVENOUS ONCE
Status: COMPLETED | OUTPATIENT
Start: 2024-01-03 | End: 2024-01-03

## 2024-01-03 RX ORDER — ONDANSETRON 2 MG/ML
4 INJECTION INTRAMUSCULAR; INTRAVENOUS EVERY 6 HOURS PRN
Status: DISCONTINUED | OUTPATIENT
Start: 2024-01-03 | End: 2024-01-06 | Stop reason: HOSPADM

## 2024-01-03 RX ORDER — AMLODIPINE BESYLATE 5 MG/1
5 TABLET ORAL DAILY
Status: DISCONTINUED | OUTPATIENT
Start: 2024-01-04 | End: 2024-01-06 | Stop reason: HOSPADM

## 2024-01-03 RX ORDER — INSULIN LISPRO 100 [IU]/ML
1-6 INJECTION, SOLUTION INTRAVENOUS; SUBCUTANEOUS EVERY 6 HOURS SCHEDULED
Status: DISCONTINUED | OUTPATIENT
Start: 2024-01-04 | End: 2024-01-05

## 2024-01-03 RX ORDER — DIPHENHYDRAMINE HYDROCHLORIDE, ZINC ACETATE 2; .1 G/100G; G/100G
CREAM TOPICAL 3 TIMES DAILY PRN
Status: DISCONTINUED | OUTPATIENT
Start: 2024-01-03 | End: 2024-01-06 | Stop reason: HOSPADM

## 2024-01-03 RX ORDER — FENTANYL CITRATE 50 UG/ML
12.5 INJECTION, SOLUTION INTRAMUSCULAR; INTRAVENOUS ONCE
Status: COMPLETED | OUTPATIENT
Start: 2024-01-03 | End: 2024-01-03

## 2024-01-03 RX ORDER — ENOXAPARIN SODIUM 100 MG/ML
1 INJECTION SUBCUTANEOUS EVERY 12 HOURS SCHEDULED
Status: DISCONTINUED | OUTPATIENT
Start: 2024-01-03 | End: 2024-01-04

## 2024-01-03 RX ORDER — ASCORBIC ACID 500 MG
500 TABLET ORAL DAILY
Status: DISCONTINUED | OUTPATIENT
Start: 2024-01-04 | End: 2024-01-06 | Stop reason: HOSPADM

## 2024-01-03 RX ORDER — ACETAMINOPHEN 325 MG/1
650 TABLET ORAL EVERY 6 HOURS PRN
Status: DISCONTINUED | OUTPATIENT
Start: 2024-01-03 | End: 2024-01-06 | Stop reason: HOSPADM

## 2024-01-03 RX ORDER — SODIUM CHLORIDE 9 MG/ML
125 INJECTION, SOLUTION INTRAVENOUS CONTINUOUS
Status: DISCONTINUED | OUTPATIENT
Start: 2024-01-03 | End: 2024-01-05

## 2024-01-03 RX ORDER — SODIUM CHLORIDE, SODIUM GLUCONATE, SODIUM ACETATE, POTASSIUM CHLORIDE, MAGNESIUM CHLORIDE, SODIUM PHOSPHATE, DIBASIC, AND POTASSIUM PHOSPHATE .53; .5; .37; .037; .03; .012; .00082 G/100ML; G/100ML; G/100ML; G/100ML; G/100ML; G/100ML; G/100ML
1000 INJECTION, SOLUTION INTRAVENOUS ONCE
Status: DISCONTINUED | OUTPATIENT
Start: 2024-01-03 | End: 2024-01-06 | Stop reason: HOSPADM

## 2024-01-03 RX ORDER — FENTANYL CITRATE 50 UG/ML
25 INJECTION, SOLUTION INTRAMUSCULAR; INTRAVENOUS ONCE
Status: COMPLETED | OUTPATIENT
Start: 2024-01-03 | End: 2024-01-03

## 2024-01-03 RX ADMIN — FENTANYL CITRATE 25 MCG: 50 INJECTION INTRAMUSCULAR; INTRAVENOUS at 20:07

## 2024-01-03 RX ADMIN — ONDANSETRON 4 MG: 2 INJECTION INTRAMUSCULAR; INTRAVENOUS at 18:07

## 2024-01-03 RX ADMIN — SODIUM CHLORIDE, SODIUM GLUCONATE, SODIUM ACETATE, POTASSIUM CHLORIDE, MAGNESIUM CHLORIDE, SODIUM PHOSPHATE, DIBASIC, AND POTASSIUM PHOSPHATE 1000 ML: .53; .5; .37; .037; .03; .012; .00082 INJECTION, SOLUTION INTRAVENOUS at 18:05

## 2024-01-03 RX ADMIN — ENOXAPARIN SODIUM 90 MG: 100 INJECTION SUBCUTANEOUS at 23:26

## 2024-01-03 RX ADMIN — AMPICILLIN SODIUM AND SULBACTAM SODIUM 3 G: 2; 1 INJECTION, POWDER, FOR SOLUTION INTRAMUSCULAR; INTRAVENOUS at 20:00

## 2024-01-03 RX ADMIN — SODIUM CHLORIDE 125 ML/HR: 0.9 INJECTION, SOLUTION INTRAVENOUS at 23:26

## 2024-01-03 RX ADMIN — FENTANYL CITRATE 12.5 MCG: 50 INJECTION INTRAMUSCULAR; INTRAVENOUS at 18:12

## 2024-01-03 NOTE — ED PROVIDER NOTES
History  Chief Complaint   Patient presents with    Abdominal Pain     Pt reports ongoing abd pain and vomiting the last few days     83-year-old female with escalating abdominal pain.  Seen here about a week ago, diagnosed with likely metastatic pancreatic cancer.  Has an MRI ordered as an outpatient, but over the last week really has only had about a piece of toast and minimal fluids.  She has no appetite, pain is too severe.  Discomfort when she does eat.  Pain seems to be right upper to right lower quadrant at this time.  She has vomited on occasion.  Has no diarrhea.  She feels bloated and gassy and belching.  She has past history of diabetes and hypertension.  She been taking all her meds with the exception of metformin.  Her blood pressure is running quite low today.  She is lightheaded, feels weak.        Prior to Admission Medications   Prescriptions Last Dose Informant Patient Reported? Taking?   Ascorbic Acid (VITAMIN C) 500 MG CAPS  Self Yes No   Sig: Take 1 tablet by mouth daily   amLODIPine (NORVASC) 5 mg tablet   No No   Sig: Take 1 tablet (5 mg total) by mouth daily   diphenhydrAMINE (BENADRYL) 2 % cream   No No   Sig: Apply topically 3 (three) times a day as needed for itching   glimepiride (AMARYL) 4 mg tablet   No No   Sig: Take 1 tablet (4 mg total) by mouth daily with breakfast   hydrochlorothiazide (HYDRODIURIL) 25 mg tablet  Self No No   Sig: Take 1 tablet (25 mg total) by mouth daily   linaGLIPtin 5 MG TABS   No No   Sig: Take 5 mg by mouth daily with or without food   lisinopril (ZESTRIL) 40 mg tablet   No No   Sig: Take 1 tablet (40 mg total) by mouth daily   metFORMIN (GLUCOPHAGE) 1000 MG tablet   No No   Sig: Take 1 tablet (1,000 mg total) by mouth 2 (two) times a day with meals   metoprolol succinate (TOPROL-XL) 100 mg 24 hr tablet  Self No No   Sig: Take 1 tablet (100 mg total) by mouth daily   ondansetron (ZOFRAN-ODT) 4 mg disintegrating tablet   No No   Sig: Take 1 tablet (4 mg total)  by mouth every 6 (six) hours as needed for nausea or vomiting for up to 5 days   triamcinolone (KENALOG) 0.1 % cream   No No   Sig: Apply topically 2 (two) times a day      Facility-Administered Medications: None       Past Medical History:   Diagnosis Date    Bereavement, uncomplicated     [id number]  RESOLVED    Coronary artery disease     Diabetes mellitus (HCC)     Hyperlipidemia     Hypertension     Lymphedema     Morbid obesity (HCC)     Myocardial infarction (HCC)        Past Surgical History:   Procedure Laterality Date    CARDIAC CATHETERIZATION      HIP SURGERY      TUBAL LIGATION         Family History   Problem Relation Age of Onset    Heart disease Mother     Diabetes type II Mother     Hypertension Mother     Heart disease Sister     Diabetes type II Sister     Diabetes type II Brother     Diabetes type II Family      I have reviewed and agree with the history as documented.    E-Cigarette/Vaping    E-Cigarette Use Never User      E-Cigarette/Vaping Substances    Nicotine No     THC No     CBD No     Flavoring No     Other No     Unknown No      Social History     Tobacco Use    Smoking status: Never    Smokeless tobacco: Never   Vaping Use    Vaping status: Never Used   Substance Use Topics    Alcohol use: No    Drug use: Never       Review of Systems   Constitutional:  Positive for appetite change, chills and fatigue. Negative for fever.   HENT:  Negative for rhinorrhea and sore throat.    Eyes:  Negative for visual disturbance.   Respiratory:  Negative for cough and shortness of breath.    Cardiovascular:  Negative for chest pain and leg swelling.   Gastrointestinal:  Positive for abdominal distention, abdominal pain, nausea and vomiting. Negative for blood in stool, constipation and diarrhea.   Genitourinary:  Negative for dysuria.   Musculoskeletal:  Negative for back pain and myalgias.   Skin:  Negative for rash.   Neurological:  Positive for weakness and light-headedness. Negative for dizziness  and headaches.   Psychiatric/Behavioral:  Negative for confusion.    All other systems reviewed and are negative.      Physical Exam  Physical Exam  Vitals and nursing note reviewed.   Constitutional:       General: She is not in acute distress.     Appearance: She is obese. She is ill-appearing.   HENT:      Head: Normocephalic and atraumatic.      Mouth/Throat:      Comments: Dry mucous membranes  Eyes:      Conjunctiva/sclera: Conjunctivae normal.   Cardiovascular:      Rate and Rhythm: Normal rate and regular rhythm.      Heart sounds: No murmur heard.  Pulmonary:      Effort: Pulmonary effort is normal. No respiratory distress.      Breath sounds: Normal breath sounds.   Abdominal:      General: Bowel sounds are decreased.      Palpations: Abdomen is soft.      Tenderness: There is abdominal tenderness in the right upper quadrant, right lower quadrant and periumbilical area. There is guarding. There is no rebound.      Hernia: No hernia is present.   Musculoskeletal:         General: No swelling.      Cervical back: Neck supple.   Skin:     General: Skin is warm and dry.      Capillary Refill: Capillary refill takes less than 2 seconds.      Coloration: Skin is pale.      Comments: Poor skin turgor   Neurological:      General: No focal deficit present.      Mental Status: She is alert.   Psychiatric:         Mood and Affect: Mood normal.         Vital Signs  ED Triage Vitals [01/03/24 1601]   Temperature Pulse Respirations Blood Pressure SpO2   97.8 °F (36.6 °C) 74 22 90/57 97 %      Temp Source Heart Rate Source Patient Position - Orthostatic VS BP Location FiO2 (%)   Temporal Monitor Lying Right arm --      Pain Score       7           Vitals:    01/03/24 2100 01/03/24 2130 01/03/24 2200 01/03/24 2230   BP: 104/55 108/54 110/55 121/57   Pulse: 70 67 60 63   Patient Position - Orthostatic VS:             Visual Acuity      ED Medications  Medications   multi-electrolyte (PLASMALYTE-A/ISOLYTE-S PH 7.4) IV  solution 1,000 mL (0 mL Intravenous Stopped 1/3/24 2012)     Followed by   multi-electrolyte (PLASMALYTE-A/ISOLYTE-S PH 7.4) IV solution 1,000 mL ( Intravenous Canceled Entry 1/3/24 2251)   enoxaparin (LOVENOX) subcutaneous injection 90 mg (90 mg Subcutaneous Given 1/3/24 2326)   amLODIPine (NORVASC) tablet 5 mg (has no administration in time range)   ascorbic acid (VITAMIN C) tablet 500 mg (has no administration in time range)   diphenhydrAMINE-zinc acetate (BENADRYL) 2-0.1 % cream (has no administration in time range)   sodium chloride 0.9 % infusion (125 mL/hr Intravenous New Bag 1/3/24 2326)   acetaminophen (TYLENOL) tablet 650 mg (has no administration in time range)   ondansetron (ZOFRAN) injection 4 mg (has no administration in time range)   insulin lispro (HumaLOG) 100 units/mL subcutaneous injection 1-6 Units (has no administration in time range)   ondansetron (ZOFRAN) injection 4 mg (4 mg Intravenous Given 1/3/24 1807)   fentaNYL citrate 12.5 mcg (12.5 mcg Intranasal Given 1/3/24 1812)   ampicillin-sulbactam (UNASYN) 3 g in sodium chloride 0.9 % 100 mL IVPB (0 g Intravenous Stopped 1/3/24 2030)   fentanyl citrate (PF) 100 MCG/2ML 25 mcg (25 mcg Intravenous Given 1/3/24 2007)       Diagnostic Studies  Results Reviewed       Procedure Component Value Units Date/Time    Lactic acid 2 Hours [[id number]]  (Normal) Collected: 01/03/24 2011    Lab Status: Final result Specimen: Blood from Arm, Right Updated: 01/03/24 2100     LACTIC ACID 1.5 mmol/L     Narrative:      Result may be elevated if tourniquet was used during collection.    HS Troponin I 2hr [[id number]]  (Normal) Collected: 01/03/24 2011    Lab Status: Final result Specimen: Blood from Arm, Right Updated: 01/03/24 2041     hs TnI 2hr 12 ng/L      Delta 2hr hsTnI 2 ng/L     HS Troponin I 4hr [[id number]]     Lab Status: No result Specimen: Blood     Procalcitonin [[id number]]  (Normal) Collected: 01/03/24 1805    Lab Status: Final result Specimen: Blood  from Arm, Right Updated: 01/03/24 1842     Procalcitonin 0.21 ng/ml     HS Troponin 0hr (reflex protocol) [[id number]]  (Normal) Collected: 01/03/24 1807    Lab Status: Final result Specimen: Blood from Arm, Right Updated: 01/03/24 1840     hs TnI 0hr 10 ng/L     Lactic acid [[id number]]  (Abnormal) Collected: 01/03/24 1807    Lab Status: Final result Specimen: Blood from Arm, Right Updated: 01/03/24 1836     LACTIC ACID 2.4 mmol/L     Narrative:      Result may be elevated if tourniquet was used during collection.    Comprehensive metabolic panel [[id number]]  (Abnormal) Collected: 01/03/24 1807    Lab Status: Final result Specimen: Blood from Arm, Right Updated: 01/03/24 1832     Sodium 129 mmol/L      Potassium 3.4 mmol/L      Chloride 89 mmol/L      CO2 25 mmol/L      ANION GAP 15 mmol/L      BUN 26 mg/dL      Creatinine 1.40 mg/dL      Glucose 318 mg/dL      Calcium 9.5 mg/dL      AST 25 U/L      ALT 16 U/L      Alkaline Phosphatase 141 U/L      Total Protein 6.9 g/dL      Albumin 3.8 g/dL      Total Bilirubin 0.60 mg/dL      eGFR 35 ml/min/1.73sq m     Narrative:      [employer] guidelines for Chronic Kidney Disease (CKD):     Stage 1 with normal or high GFR (GFR > 90 mL/min/1.73 square meters)    Stage 2 Mild CKD (GFR = 60-89 mL/min/1.73 square meters)    Stage 3A Moderate CKD (GFR = 45-59 mL/min/1.73 square meters)    Stage 3B Moderate CKD (GFR = 30-44 mL/min/1.73 square meters)    Stage 4 Severe CKD (GFR = 15-29 mL/min/1.73 square meters)    Stage 5 End Stage CKD (GFR <15 mL/min/1.73 square meters)  Note: GFR calculation is accurate only with a steady state creatinine    Lipase [[id number]]  (Abnormal) Collected: 01/03/24 1807    Lab Status: Final result Specimen: Blood from Arm, Right Updated: 01/03/24 1832     Lipase 249 u/L     Protime-INR [[id number]]  (Normal) Collected: 01/03/24 1807    Lab Status: Final result Specimen: Blood from Arm, Right Updated: 01/03/24 1827     Protime 14.4  seconds      INR 1.10    APTT [[id number]]  (Normal) Collected: 01/03/24 1807    Lab Status: Final result Specimen: Blood from Arm, Right Updated: 01/03/24 1827     PTT 28 seconds     CBC and differential [[id number]] Collected: 01/03/24 1807    Lab Status: Final result Specimen: Blood from Arm, Right Updated: 01/03/24 1814     WBC 7.75 Thousand/uL      RBC 4.35 Million/uL      Hemoglobin 12.8 g/dL      Hematocrit 39.0 %      MCV 90 fL      MCH 29.4 pg      MCHC 32.8 g/dL      RDW 11.9 %      MPV 10.9 fL      Platelets 193 Thousands/uL      nRBC 0 /100 WBCs      Neutrophils Relative 65 %      Immat GRANS % 0 %      Lymphocytes Relative 26 %      Monocytes Relative 8 %      Eosinophils Relative 1 %      Basophils Relative 0 %      Neutrophils Absolute 5.01 Thousands/µL      Immature Grans Absolute 0.02 Thousand/uL      Lymphocytes Absolute 2.03 Thousands/µL      Monocytes Absolute 0.59 Thousand/µL      Eosinophils Absolute 0.07 Thousand/µL      Basophils Absolute 0.03 Thousands/µL     Blood culture #2 [[id number]] Collected: 01/03/24 1807    Lab Status: In process Specimen: Blood from Arm, Right Updated: 01/03/24 1812    Blood culture #1 [[id number]] Collected: 01/03/24 1807    Lab Status: In process Specimen: Blood from Arm, Left Updated: 01/03/24 1812    UA w Reflex to Microscopic w Reflex to Culture [[id number]]     Lab Status: No result Specimen: Urine                    CT abdomen pelvis wo contrast   ED Interpretation by Anibal Millan DO (01/03 2130)   Mass of the pancreatic head measuring 2.4 x 1.6 cm, atrophic pancreatic body and tail.  Hazy mesentery below the pancreatic mass suspicious for venous engorgement possibly SMV tumor thrombus.  Multiple liver lesions as noted.      Final Result by Tyrell Gunderson MD (01/03 2008)      Pancreatic head mass measuring approximately 2.4 x 1.6 cm with atrophy of the pancreatic body and tail. IV contrast would be helpful in this setting to better assess for any vascular  invasion.      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      Multiple liver lesions consistent with metastatic disease.               Workstation performed: [id number]                    Procedures  ECG 12 Lead Documentation Only    Date/Time: 1/3/2024 5:55 PM    Performed by: Anibal Millan DO  Authorized by: Anibal Millan DO    Indications / Diagnosis:  Epigastric pain  ECG reviewed by me, the ED Provider: yes    Patient location:  ED  Previous ECG:     Previous ECG:  Compared to current    Similarity:  No change    Comparison to cardiac monitor: Yes    Interpretation:     Interpretation: non-specific    Quality:     Tracing quality:  Limited by artifact  Rate:     ECG rate:  63    ECG rate assessment: normal    Rhythm:     Rhythm: sinus rhythm    Ectopy:     Ectopy: none    QRS:     QRS axis:  Normal    QRS intervals:  Normal  Conduction:     Conduction: normal    ST segments:     ST segments:  Non-specific  T waves:     T waves: non-specific             ED Course  ED Course as of 01/04/24 0036   Wed Jan 03, 2024 1844 Patient's pain seems to be little better, however she has increasing abdominal bloating and discomfort.  Amylase is running high, creatinine is up to 1.4 suggestive of acute kidney injury.  Lactate is also elevated.  Suspect that is related to underlying dehydration   1846 Lactate is elevated, patient's abdominal discomfort is slowly increasing, also though she does not describe this as pain.  Increasing concern for abdominal infection.  Given the elevated lactate, elevated lipase will treat with Unasyn at this point.   2119 Given the CAT scan findings, I reached out to oncology to see what recommendations they may have   2129 Spoke with oncology, agreed to admit here, IV hydration pain control, Lovenox due to the abnormal CAT scan findings.             HEART Risk Score      Flowsheet Row Most Recent Value   Heart Score Risk  Calculator    History 0 Filed at: 01/03/2024 1853   ECG 1 Filed at: 01/03/2024 1853   Age 2 Filed at: 01/03/2024 1853   Risk Factors 1 Filed at: 01/03/2024 1853   Troponin 0 Filed at: 01/03/2024 1853   HEART Score 4 Filed at: 01/03/2024 1853                          SBIRT 20yo+      Flowsheet Row Most Recent Value   Initial Alcohol Screen: US AUDIT-C     1. How often do you have a drink containing alcohol? 0 Filed at: 01/03/2024 1605   2. How many drinks containing alcohol do you have on a typical day you are drinking?  0 Filed at: 01/03/2024 1605   3b. FEMALE Any Age, or MALE 65+: How often do you have 4 or more drinks on one occassion? 0 Filed at: 01/03/2024 1605   Audit-C Score 0 Filed at: 01/03/2024 1605   LISA: How many times in the past year have you...    Used an illegal drug or used a prescription medication for non-medical reasons? Never Filed at: 01/03/2024 1605                      Medical Decision Making  83-year-old female with escalating abdominal pain.  Seen here about a week ago, diagnosed with likely metastatic pancreatic cancer.  Has an MRI ordered as an outpatient, but over the last week really has only had about a piece of toast and minimal fluids.  She has no appetite, pain is too severe.  Discomfort when she does eat.  Pain seems to be right upper to right lower quadrant at this time.  She has vomited on occasion.  Has no diarrhea.  She feels bloated and gassy and belching.  She has past history of diabetes and hypertension.  She been taking all her meds with the exception of metformin.  Her blood pressure is running quite low today.  She is lightheaded, feels weak.  While she has likely metastatic cancer of the pancreas, her pain seems more right lower quadrant which means she may have an acute appendicitis.  She clearly is dehydrated so sepsis fluids are being delivered, will treat pain appropriately.  High likelihood of carcinomatosis.  No evidence of bowel obstruction at this  time.  Differential includes also besides bowel obstruction, appendicitis, ischemic gut given her age and diabetes, as well as her hypotension.    Amount and/or Complexity of Data Reviewed  External Data Reviewed: labs, radiology, ECG and notes.  Labs: ordered.     Details: Labs reviewed and acute kidney injury is clearly documented.  Likely element of pancreatitis as well given the overall elevated lipase and CAT scan findings.  Radiology: ordered.     Details: CT revealing the mass the pancreatic head with associated inflammatory changes and possible tumor associated thrombosis of the SMV.  ECG/medicine tests: ordered and independent interpretation performed.     Details: EKG ordered and interpreted by me revealing normal sinus rhythm with diffuse nonspecific ST-T wave abnormalities, a fair amount of artifact is noted in the EKG.  Discussion of management or test interpretation with external provider(s): Discussed case with oncology, who agrees patient may stay, be treated appropriately with anticoagulation on the floor, hydration and pain control.  Pain was controlled after about 37.5 mg of fentanyl.    Risk  Decision regarding hospitalization.  Risk Details: Patient was agreeable to admission.  She is aware of the severity of her disease.  Due for an MRI, this was to be done as an outpatient but she is not tolerating pain or food so it may need to be done with her hospital stay.  Case was discussed with slim as well             Disposition  Final diagnoses:   Abdominal pain   Acute kidney injury (HCC)     Time reflects when diagnosis was documented in both MDM as applicable and the Disposition within this note       Time User Action Codes Description Comment    1/3/2024  9:46 PM Anibal Millan [R10.9] Abdominal pain     1/3/2024  9:46 PM Anibal Millan Add [N17.9] Acute kidney injury (HCC)     1/3/2024 10:26 PM Charlie Avelar Add [K86.89] Pancreatic mass           ED Disposition       ED Disposition    Admit    Condition   Stable    Date/Time   Wed Steffen 3, 2024 2446    Comment   Case was discussed with Charlie Lamb and the patient's admission status was agreed to be Admission Status: inpatient status to the service of Dr. Mccormick .               Follow-up Information    None         Patient's Medications   Discharge Prescriptions    No medications on file       No discharge procedures on file.    PDMP Review       None            ED Provider  Electronically Signed by             Anibal Millan DO  01/04/24 0036

## 2024-01-04 ENCOUNTER — APPOINTMENT (OUTPATIENT)
Dept: INTERVENTIONAL RADIOLOGY/VASCULAR | Facility: HOSPITAL | Age: 84
DRG: 394 | End: 2024-01-04
Payer: COMMERCIAL

## 2024-01-04 ENCOUNTER — APPOINTMENT (INPATIENT)
Dept: NON INVASIVE DIAGNOSTICS | Facility: HOSPITAL | Age: 84
DRG: 394 | End: 2024-01-04
Payer: COMMERCIAL

## 2024-01-04 PROBLEM — E87.20 LACTIC ACIDOSIS: Status: ACTIVE | Noted: 2024-01-04

## 2024-01-04 PROBLEM — E87.1 HYPONATREMIA: Status: ACTIVE | Noted: 2024-01-04

## 2024-01-04 PROBLEM — N17.9 ACUTE KIDNEY INJURY (HCC): Status: ACTIVE | Noted: 2024-01-04

## 2024-01-04 PROBLEM — K85.90 ACUTE PANCREATITIS: Status: ACTIVE | Noted: 2024-01-04

## 2024-01-04 PROBLEM — R10.9 ABDOMINAL PAIN: Status: ACTIVE | Noted: 2024-01-04

## 2024-01-04 LAB
ALBUMIN SERPL BCP-MCNC: 3.1 G/DL (ref 3.5–5)
ALP SERPL-CCNC: 113 U/L (ref 34–104)
ALT SERPL W P-5'-P-CCNC: 13 U/L (ref 7–52)
ANION GAP SERPL CALCULATED.3IONS-SCNC: 9 MMOL/L
AST SERPL W P-5'-P-CCNC: 21 U/L (ref 13–39)
ATRIAL RATE: 63 BPM
BILIRUB SERPL-MCNC: 0.49 MG/DL (ref 0.2–1)
BUN SERPL-MCNC: 22 MG/DL (ref 5–25)
C DIFF TOX GENS STL QL NAA+PROBE: NEGATIVE
CALCIUM ALBUM COR SERPL-MCNC: 9.2 MG/DL (ref 8.3–10.1)
CALCIUM SERPL-MCNC: 8.5 MG/DL (ref 8.4–10.2)
CHLORIDE SERPL-SCNC: 96 MMOL/L (ref 96–108)
CO2 SERPL-SCNC: 29 MMOL/L (ref 21–32)
CREAT SERPL-MCNC: 1.24 MG/DL (ref 0.6–1.3)
ERYTHROCYTE [DISTWIDTH] IN BLOOD BY AUTOMATED COUNT: 12 % (ref 11.6–15.1)
GFR SERPL CREATININE-BSD FRML MDRD: 40 ML/MIN/1.73SQ M
GLUCOSE SERPL-MCNC: 117 MG/DL (ref 65–140)
GLUCOSE SERPL-MCNC: 133 MG/DL (ref 65–140)
GLUCOSE SERPL-MCNC: 181 MG/DL (ref 65–140)
GLUCOSE SERPL-MCNC: 186 MG/DL (ref 65–140)
GLUCOSE SERPL-MCNC: 193 MG/DL (ref 65–140)
GLUCOSE SERPL-MCNC: 229 MG/DL (ref 65–140)
HCT VFR BLD AUTO: 36 % (ref 34.8–46.1)
HGB BLD-MCNC: 11.6 G/DL (ref 11.5–15.4)
LIPASE SERPL-CCNC: 322 U/L (ref 11–82)
MCH RBC QN AUTO: 29.2 PG (ref 26.8–34.3)
MCHC RBC AUTO-ENTMCNC: 32.2 G/DL (ref 31.4–37.4)
MCV RBC AUTO: 91 FL (ref 82–98)
P AXIS: -1 DEGREES
PLATELET # BLD AUTO: 189 THOUSANDS/UL (ref 149–390)
PMV BLD AUTO: 10.7 FL (ref 8.9–12.7)
POTASSIUM SERPL-SCNC: 3.8 MMOL/L (ref 3.5–5.3)
PR INTERVAL: 132 MS
PROT SERPL-MCNC: 5.7 G/DL (ref 6.4–8.4)
QRS AXIS: -21 DEGREES
QRSD INTERVAL: 94 MS
QT INTERVAL: 442 MS
QTC INTERVAL: 452 MS
RBC # BLD AUTO: 3.97 MILLION/UL (ref 3.81–5.12)
SODIUM SERPL-SCNC: 134 MMOL/L (ref 135–147)
T WAVE AXIS: 143 DEGREES
VENTRICULAR RATE: 63 BPM
WBC # BLD AUTO: 9.09 THOUSAND/UL (ref 4.31–10.16)

## 2024-01-04 PROCEDURE — 0FB13ZX EXCISION OF RIGHT LOBE LIVER, PERCUTANEOUS APPROACH, DIAGNOSTIC: ICD-10-PCS | Performed by: RADIOLOGY

## 2024-01-04 PROCEDURE — 36415 COLL VENOUS BLD VENIPUNCTURE: CPT | Performed by: PHYSICIAN ASSISTANT

## 2024-01-04 PROCEDURE — 99223 1ST HOSP IP/OBS HIGH 75: CPT | Performed by: INTERNAL MEDICINE

## 2024-01-04 PROCEDURE — 85027 COMPLETE CBC AUTOMATED: CPT | Performed by: PHYSICIAN ASSISTANT

## 2024-01-04 PROCEDURE — NC001 PR NO CHARGE: Performed by: RADIOLOGY

## 2024-01-04 PROCEDURE — NC001 PR NO CHARGE: Performed by: PHYSICIAN ASSISTANT

## 2024-01-04 PROCEDURE — 87493 C DIFF AMPLIFIED PROBE: CPT | Performed by: PHYSICIAN ASSISTANT

## 2024-01-04 PROCEDURE — 93971 EXTREMITY STUDY: CPT

## 2024-01-04 PROCEDURE — 80053 COMPREHEN METABOLIC PANEL: CPT | Performed by: PHYSICIAN ASSISTANT

## 2024-01-04 PROCEDURE — 82948 REAGENT STRIP/BLOOD GLUCOSE: CPT

## 2024-01-04 PROCEDURE — 93971 EXTREMITY STUDY: CPT | Performed by: SURGERY

## 2024-01-04 PROCEDURE — 83690 ASSAY OF LIPASE: CPT | Performed by: PHYSICIAN ASSISTANT

## 2024-01-04 RX ORDER — PANTOPRAZOLE SODIUM 40 MG/10ML
INJECTION, POWDER, LYOPHILIZED, FOR SOLUTION INTRAVENOUS
Status: DISPENSED
Start: 2024-01-04 | End: 2024-01-04

## 2024-01-04 RX ORDER — HYDROCODONE BITARTRATE AND ACETAMINOPHEN 5; 325 MG/1; MG/1
1 TABLET ORAL EVERY 6 HOURS PRN
Status: DISCONTINUED | OUTPATIENT
Start: 2024-01-04 | End: 2024-01-06 | Stop reason: HOSPADM

## 2024-01-04 RX ADMIN — HYDROCODONE BITARTRATE AND ACETAMINOPHEN 1 TABLET: 5; 325 TABLET ORAL at 02:36

## 2024-01-04 RX ADMIN — SODIUM CHLORIDE 500 ML: 0.9 INJECTION, SOLUTION INTRAVENOUS at 13:06

## 2024-01-04 RX ADMIN — INSULIN LISPRO 1 UNITS: 100 INJECTION, SOLUTION INTRAVENOUS; SUBCUTANEOUS at 12:28

## 2024-01-04 RX ADMIN — INSULIN LISPRO 1 UNITS: 100 INJECTION, SOLUTION INTRAVENOUS; SUBCUTANEOUS at 06:22

## 2024-01-04 RX ADMIN — INSULIN LISPRO 2 UNITS: 100 INJECTION, SOLUTION INTRAVENOUS; SUBCUTANEOUS at 00:35

## 2024-01-04 RX ADMIN — OXYCODONE HYDROCHLORIDE AND ACETAMINOPHEN 500 MG: 500 TABLET ORAL at 09:03

## 2024-01-04 RX ADMIN — ACETAMINOPHEN 650 MG: 325 TABLET ORAL at 19:37

## 2024-01-04 NOTE — TELEMEDICINE
e-Consult ([facility])  - Interventional Radiology  Candi Carpenter 83 y.o. female MRN: [id number]  Unit/Bed#: ED 18 Encounter: [id number]        Interventional Radiology has been consulted to evaluate Candi Carpenter    We were consulted by medicine concerning this patient with suspected diagnosis of likely metastatic pancreatic cancer with liver masses.  Patient also has intractable abdominal pain and inability to tolerate eating and drinking. Patient admitted to the hospital yesterday for management of severe pain and DUC/dehydration.     Interventional radiology has been asked to evaluate the patient for biopsy of possible pancreatic versus liver lesions to further understand this patient's disease.    Inpatient Consult to IR  Consult performed by: Vik Gandhi PA-C  Consult ordered by: Charlie Avelar PA-C        01/04/24    Assessment/Recommendation:     Suspected Pancreatic Cancer  - plan for biopsy of liver lesion, pending IR scheduling  - maintain NPO  - hold anticoagulation and antiplatelet medications        2. Intractable Abdominal Pain 2/2 Malignancy  - plan to trial celiac plexus block to manage pain and discomfort, pending IR scheduling  - maintain NPO  - hold anticoagulation and antiplatelet medications        11-20 minutes, >50% of the total time devoted to medical consultative verbal/EMR discussion between providers. Written report will be generated in the EMR.     Thank you for allowing Interventional Radiology to participate in the care of Candi Carpenter. Please don't hesitate to call or TigerText us with any questions.     Vik Gandhi PA-C

## 2024-01-04 NOTE — PLAN OF CARE

## 2024-01-04 NOTE — ASSESSMENT & PLAN NOTE
"Will admit patient to medicine  Make patient n.p.o.  Give pain control and antiemetics as needed  Hydrate with normal saline 125 cc/h  Continue to trend lipase  CT imaging revealed \"Pancreatic head mass measuring approximately 2.4 x 1.6 cm with atrophy of the pancreatic body and tail\"    "

## 2024-01-04 NOTE — ASSESSMENT & PLAN NOTE
Initial lactic 2.4  Likely secondary to metformin use  Resolved status post fluid resuscitation with repeat lactic of 1.5  No further workup at this time

## 2024-01-04 NOTE — ASSESSMENT & PLAN NOTE
"CT imaging revealed \"Multiple hypodense lesions are again seen consistent with metastatic disease the largest in the periphery of the right lobe measures 3.6 x 3.3 cm unchanged\" and \"Hazy mesentery below the pancreatic mass suspicious for venous engorgement and possible SMV tumor thrombus\"  Will begin Lovenox 1 mg/kg SQ every 12 hours  Consult IR in a.m.  Case was discussed by ER provider with oncology on-call  "

## 2024-01-04 NOTE — PROGRESS NOTES
Discussed celiac and splanchnic plexus block for pain control.  She reports current oral medications are controlling pain adequately and she would defer.  I told her we can always revisit the issue, later.    Discussed liver biopsy as well.  Quoted 2% risk of significant bleeding.  She is agreeable.

## 2024-01-04 NOTE — ASSESSMENT & PLAN NOTE
Lab Results   Component Value Date    HGBA1C 11.6 (H) 10/17/2023       Recent Labs     01/04/24  0032   POCGLU 229*       Blood Sugar Average: Last 72 hrs:  (P) 229    Patient is n.p.o. at this time  We will hold prehospital oral antihyperglycemic's  Obtain Accu-Cheks every 6 hours with Humalog correction dose every 6 hours

## 2024-01-04 NOTE — CONSULTS
Patient aware and consented that a consult is being performed on their behalf to Dr. Russell the patient is aware that they may not see the provider face to face, but the provider may review their medical record and give recommendations. The patient may elect to see the provider in person if requested.      83-year-old  female with abdominal pain, vomiting since December 2023, was admitted on 12/26/2023 and CT scan of the abdomen and pelvis showed enlargement of the pancreatic head with central hypodensity and surrounding inflammatory changes with atrophy changes at the pancreatic body and tail, numerous ill-defined hypodense lesions throughout the liver measuring up to 3.4 cm, cholecystectomy creatinine 1.4, BUN 26, glucose 318, alkaline phosphatase 141, ALT 16, AST 25 bilirubin 0.6    She was evaluated today by GI and the need for liver biopsy to confirm staging and diagnosis as well as possible celiac plexus block for pain and discomfort depends on the IR schedule time    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

## 2024-01-04 NOTE — H&P
"[facility]  H&P  Name: Candi Carpenter 83 y.o. female I MRN: [id number]  Unit/Bed#: ED 18 I Date of Admission: 1/3/2024   Date of Service: 1/4/2024 I Hospital Day: 1      Assessment/Plan   * Acute pancreatitis  Assessment & Plan  Will admit patient to medicine  Make patient n.p.o.  Give pain control and antiemetics as needed  Hydrate with normal saline 125 cc/h  Continue to trend lipase  CT imaging revealed \"Pancreatic head mass measuring approximately 2.4 x 1.6 cm with atrophy of the pancreatic body and tail\"      Abdominal pain  Assessment & Plan  CT imaging revealed \"Multiple hypodense lesions are again seen consistent with metastatic disease the largest in the periphery of the right lobe measures 3.6 x 3.3 cm unchanged\" and \"Hazy mesentery below the pancreatic mass suspicious for venous engorgement and possible SMV tumor thrombus\"  Will begin Lovenox 1 mg/kg SQ every 12 hours  Consult IR in a.m.  Case was discussed by ER provider with oncology on-call    Acute kidney injury (HCC)  Assessment & Plan  Baseline creatinine appears approximately 0.9 versus today's of 1.4  Likely secondary to dehydration  We will hydrate with normal saline 125 cc/h  Continue to monitor with repeat labs in a.m.  Consider nephrology consult    Lactic acidosis  Assessment & Plan  Initial lactic 2.4  Likely secondary to metformin use  Resolved status post fluid resuscitation with repeat lactic of 1.5  No further workup at this time    Hyponatremia  Assessment & Plan  Initial sodium 129  Hydrate with normal saline 125 cc/h  Continue to monitor with repeat labs in a.m.    Type 2 diabetes mellitus with stage 3 chronic kidney disease, without long-term current use of insulin, unspecified whether stage 3a or 3b CKD (HCC)  Assessment & Plan  Lab Results   Component Value Date    HGBA1C 11.6 (H) 10/17/2023       Recent Labs     01/04/24  0032   POCGLU 229*       Blood Sugar Average: Last 72 hrs:  (P) 229    Patient is " n.p.o. at this time  We will hold prehospital oral antihyperglycemic's  Obtain Accu-Cheks every 6 hours with Humalog correction dose every 6 hours    Hypertension  Assessment & Plan  Continue prehospital Norvasc 5 mg p.o. daily         VTE Prophylaxis: Enoxaparin (Lovenox)  Code Status: Level 1  Discussion with family: None present at bedside at time of exam    Anticipated Length of Stay:  Patient will be admitted on an Inpatient basis with an anticipated length of stay of  > 2 midnights.   Justification for Hospital Stay: Abdominal pain is likely pancreatic, [place] further evaluation, acute kidney injury requiring IV fluid resuscitation continue to monitor    Total Time for Visit, including Counseling / Coordination of Care: 1 hour.  Greater than 50% of this total time spent on direct patient counseling and coordination of care.    Chief Complaint:   Pain x 1 week    History of Present Illness:    Candi Crapenter is a 83 y.o. female who presents with abdominal pain x 1 week.  She was seen previously in the ER on 12/26/2023 and diagnosed with likely metastatic pancreatic cancer and has an MRI ordered as an outpatient presents ER after having increasing pain with some nausea and vomiting over the past week.  States that she has been vomiting at least once a day usually at night is complaining of 8 out of 10 right upper quadrant abdominal pain without aggravating or alleviating factors.  Patient additionally is complaining of feeling bloated and gassy with increased belching.    Patient is a non-insulin-dependent diabetic and states that she has been taking her medications at home despite not feeling well.  Additionally complains of some lightheadedness and generally feeling weak.    Review of Systems:    Review of Systems   Constitutional:  Positive for appetite change, chills and fatigue. Negative for fever.   HENT:  Negative for congestion and sore throat.    Respiratory:  Negative for cough, shortness of breath  and wheezing.    Cardiovascular:  Negative for chest pain and palpitations.   Gastrointestinal:  Positive for abdominal distention, abdominal pain, nausea and vomiting. Negative for anal bleeding and diarrhea.   Genitourinary:  Negative for dysuria, frequency, hematuria and urgency.   Musculoskeletal:  Negative for arthralgias and myalgias.   Skin:  Negative for wound.   Neurological:  Positive for weakness and light-headedness. Negative for dizziness, syncope and headaches.   All other systems reviewed and are negative.      Past Medical and Surgical History:     Past Medical History:   Diagnosis Date    Bereavement, uncomplicated     12NOV2015  RESOLVED    Coronary artery disease     Diabetes mellitus (HCC)     Hyperlipidemia     Hypertension     Lymphedema     Morbid obesity (HCC)     Myocardial infarction (HCC)        Past Surgical History:   Procedure Laterality Date    CARDIAC CATHETERIZATION      HIP SURGERY      TUBAL LIGATION         Meds/Allergies:    Prior to Admission medications    Medication Sig Start Date End Date Taking? Authorizing Provider   amLODIPine (NORVASC) 5 mg tablet Take 1 tablet (5 mg total) by mouth daily 5/4/23   Pee Aanya MD   Ascorbic Acid (VITAMIN C) 500 MG CAPS Take 1 tablet by mouth daily    Historical Provider, MD   diphenhydrAMINE (BENADRYL) 2 % cream Apply topically 3 (three) times a day as needed for itching 5/12/23   Ming Paredes MD   glimepiride (AMARYL) 4 mg tablet Take 1 tablet (4 mg total) by mouth daily with breakfast 8/17/23   Ming Paredes MD   hydrochlorothiazide (HYDRODIURIL) 25 mg tablet Take 1 tablet (25 mg total) by mouth daily 12/28/22   Pee Anaya MD   linaGLIPtin 5 MG TABS Take 5 mg by mouth daily with or without food 8/3/23 1/30/24  Ming Paredes MD   lisinopril (ZESTRIL) 40 mg tablet Take 1 tablet (40 mg total) by mouth daily 5/4/23 5/3/24  Pee Anaya MD   metFORMIN (GLUCOPHAGE) 1000 MG tablet Take 1 tablet  "(1,000 mg total) by mouth 2 (two) times a day with meals 8/3/23   Ming Paredes MD   metoprolol succinate (TOPROL-XL) 100 mg 24 hr tablet Take 1 tablet (100 mg total) by mouth daily 11/29/22   Pee Anaya MD   ondansetron (ZOFRAN-ODT) 4 mg disintegrating tablet Take 1 tablet (4 mg total) by mouth every 6 (six) hours as needed for nausea or vomiting for up to 5 days 12/27/23 1/1/24  Richard Dickens DO   triamcinolone (KENALOG) 0.1 % cream Apply topically 2 (two) times a day 8/3/23   Ming Paredes MD     all medications and allergies reviewed    Allergies:   Allergies   Allergen Reactions    Jardiance [Empagliflozin]     Prandin [Repaglinide] Edema    Sulfa Antibiotics Other (See Comments)    Zinc Acetate Rash       Social History:     Marital Status:    Occupation: Retired  Patient Pre-hospital Living Situation: Resides at home with   Patient Pre-hospital Level of Mobility: Full with assist of a cane  Patient Pre-hospital Diet Restrictions: Diabetic    Social History     Substance and Sexual Activity   Alcohol Use No     Social History     Tobacco Use   Smoking Status Never   Smokeless Tobacco Never     Social History     Substance and Sexual Activity   Drug Use Never       Family History:  I have reviewed the patient's family history    Physical Exam:     Vitals:   Blood Pressure: 121/57 (01/03/24 2230)  Pulse: 63 (01/03/24 2230)  Temperature: 97.8 °F (36.6 °C) (01/03/24 1601)  Temp Source: Temporal (01/03/24 1601)  Respirations: 18 (01/03/24 2230)  Height: 4' 11\" (149.9 cm) (01/03/24 1601)  SpO2: 97 % (01/03/24 2230)    Physical Exam  Vitals and nursing note reviewed.   Constitutional:       General: She is not in acute distress.     Appearance: Normal appearance. She is obese.   HENT:      Head: Normocephalic and atraumatic.      Right Ear: Tympanic membrane normal.      Left Ear: Tympanic membrane normal.      Nose: Nose normal.      Mouth/Throat:      Mouth: Mucous " membranes are moist.      Pharynx: Oropharynx is clear. No posterior oropharyngeal erythema.   Eyes:      Extraocular Movements: Extraocular movements intact.      Conjunctiva/sclera: Conjunctivae normal.      Pupils: Pupils are equal, round, and reactive to light.   Cardiovascular:      Rate and Rhythm: Normal rate and regular rhythm.      Pulses: Normal pulses.      Heart sounds: Normal heart sounds. No murmur heard.  Pulmonary:      Effort: Pulmonary effort is normal. No respiratory distress.      Breath sounds: Normal breath sounds. No rhonchi or rales.   Abdominal:      General: Bowel sounds are normal. There is distension.      Palpations: Abdomen is soft.      Tenderness: There is generalized abdominal tenderness and tenderness in the right upper quadrant.      Comments: Generalized abdominal pain to palpation greatest in the right upper quadrant   Musculoskeletal:      Cervical back: Normal range of motion and neck supple. No muscular tenderness.      Right lower leg: No edema.      Left lower leg: No edema.   Skin:     General: Skin is warm and dry.      Capillary Refill: Capillary refill takes less than 2 seconds.   Neurological:      General: No focal deficit present.      Mental Status: She is alert and oriented to person, place, and time.         Additional Data:     Lab Results: I have personally reviewed pertinent reports.      Results from last 7 days   Lab Units 01/03/24  1807   WBC Thousand/uL 7.75   HEMOGLOBIN g/dL 12.8   HEMATOCRIT % 39.0   PLATELETS Thousands/uL 193   NEUTROS PCT % 65   LYMPHS PCT % 26   MONOS PCT % 8   EOS PCT % 1     Results from last 7 days   Lab Units 01/03/24  1807   SODIUM mmol/L 129*   POTASSIUM mmol/L 3.4*   CHLORIDE mmol/L 89*   CO2 mmol/L 25   BUN mg/dL 26*   CREATININE mg/dL 1.40*   ANION GAP mmol/L 15   CALCIUM mg/dL 9.5   ALBUMIN g/dL 3.8   TOTAL BILIRUBIN mg/dL 0.60   ALK PHOS U/L 141*   ALT U/L 16   AST U/L 25   GLUCOSE RANDOM mg/dL 318*     Results from last 7  days   Lab Units 01/03/24  1807   INR  1.10     Results from last 7 days   Lab Units 01/04/24  0032   POC GLUCOSE mg/dl 229*         Results from last 7 days   Lab Units 01/03/24 2011 01/03/24  1807   LACTIC ACID mmol/L 1.5 2.4*   PROCALCITONIN ng/ml  --  0.21       Imaging: I have personally reviewed pertinent reports.    CT abdomen pelvis wo contrast   ED Interpretation by Anibal Millan DO (01/03 2130)   Mass of the pancreatic head measuring 2.4 x 1.6 cm, atrophic pancreatic body and tail.  Hazy mesentery below the pancreatic mass suspicious for venous engorgement possibly SMV tumor thrombus.  Multiple liver lesions as noted.      Final Result by Tyrell Gunderson MD (01/03 2008)      Pancreatic head mass measuring approximately 2.4 x 1.6 cm with atrophy of the pancreatic body and tail. IV contrast would be helpful in this setting to better assess for any vascular invasion.      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      Multiple liver lesions consistent with metastatic disease.               Workstation performed: [id number]               EKG, Pathology, and Other Studies Reviewed on Admission:   EKG: N/A    [facility] / Care Everywhere Records Reviewed: Yes    ** Please Note: This note has been constructed using a voice recognition system. **

## 2024-01-04 NOTE — ASSESSMENT & PLAN NOTE
Baseline creatinine appears approximately 0.9 versus today's of 1.4  Likely secondary to dehydration  We will hydrate with normal saline 125 cc/h  Continue to monitor with repeat labs in a.m.  Consider nephrology consult

## 2024-01-04 NOTE — ASSESSMENT & PLAN NOTE
Initial sodium 129  Hydrate with normal saline 125 cc/h  Continue to monitor with repeat labs in a.m.

## 2024-01-04 NOTE — UTILIZATION REVIEW
"Initial Clinical Review    Admission: Date/Time/Statement:   Admission Orders (From admission, onward)       Ordered        01/03/24 2147  INPATIENT ADMISSION  Once                          Orders Placed This Encounter   Procedures    INPATIENT ADMISSION     Standing Status:   Standing     Number of Occurrences:   1     Order Specific Question:   Level of Care     Answer:   Med Surg [16]     Order Specific Question:   Estimated length of stay     Answer:   More than 2 Midnights     Order Specific Question:   Certification     Answer:   I certify that inpatient services are medically necessary for this patient for a duration of greater than two midnights. See H&P and MD Progress Notes for additional information about the patient's course of treatment.     ED Arrival Information       Expected   -    Arrival   1/3/2024 15:36    Acuity   Urgent              Means of arrival   Walk-In    Escorted by   Friend    Service   Hospitalist    Admission type   Emergency              Arrival complaint   Abdominal pain, vomiting, diarrhea             Chief Complaint   Patient presents with    Abdominal Pain     Pt reports ongoing abd pain and vomiting the last few days       Initial Presentation: 83 y.o. female to the ED from home with complaints of abdominal pain, vomiting the past few days. Admitted to inpatient for acute pancreatitis, abdominal pain, DUC, lactic acidosis, hyponatremia. H/O DMII.  Arrives with tachypnea, RUQ abdominal pain feeling bloated. Nausea, vomiting. Appearing ill with decreased bowel sounds, pale, poor skin turgor. Guarding. CT a/p shows: Pancreatic head mass measuring approximately 2.4 x 1.6 cm with atrophy of the pancreatic body and tail\". Started on Lovenox.  IR consult.  Creat 1.4 on arrival, baseline 0.9. Started on IV fluids, repeat lactic acid, creat, sodium.     Date: 1/4   Day 2: Continues with abdominal pain, unlikely due to acute pancreatitis.  Plan for biopsy. Poor po intake and n/v/d, " suggesting prerenal etiology.     IR consult: Plan for biopsy liver lesion.  Keep NPO. Hold anticoag.       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 Plan for biopsy.     Date: 1/5    Day 3: Has surpassed a 2nd midnight with active treatments and services, which include continue with iv fluids, pain meds, pending biopsy.    ED Triage Vitals [01/03/24 1601]   Temperature Pulse Respirations Blood Pressure SpO2   97.8 °F (36.6 °C) 74 22 90/57 97 %      Temp Source Heart Rate Source Patient Position - Orthostatic VS BP Location FiO2 (%)   Temporal Monitor Lying Right arm --      Pain Score       7          Wt Readings from Last 1 Encounters:   08/03/23 93.2 kg (205 lb 6.4 oz)     Additional Vital Signs: Vital Signs (last 2 days)    Date/Time Temp Pulse Resp BP MAP (mmHg) SpO2 O2 Device Patient Position - Orthostatic VS   01/04/24 1100 -- -- -- -- -- -- None (Room air) --   01/04/24 0903 -- -- -- 95/50 -- -- -- --   01/03/24 2230 -- 63 18 121/57 82 97 % -- --   01/03/24 2200 -- 60 15 110/55 79 97 % -- --   01/03/24 2130 -- 67 22 108/54 75 98 % -- --   01/03/24 2100 -- 70 16 104/55 77 97 % -- --   01/03/24 2030 -- 71 14 108/53 76 97 % -- --   01/03/24 2000 -- 63 15 122/58 84 96 % -- --   01/03/24 1930 -- 61 15 117/59 85 94 % -- --   01/03/24 1900 -- 67 18 126/58 84 99 % None (Room air) Lying   01/03/24 1604 -- -- -- -- -- 98 % -- --   01/03/24 1601 97.8 °F (36.6 °C) 74 22 90/57 -- 97 % None (Room air) Lyi     Pertinent Labs/Diagnostic Test Results:   1/3 EKG: Narrative & Impression    Normal sinus rhythm  ST & T wave abnormality, consider lateral ischemia  Abnormal ECG  When compared with ECG of 27-DEC-2023 01:01,  Nonspecific T wave abnormality now evident in Inferior leads     CT abdomen pelvis wo contrast   ED Interpretation by Anibal Millan DO (01/03 2130)   Mass of the pancreatic head measuring 2.4 x 1.6 cm, atrophic pancreatic  body and tail.  Hazy mesentery below the pancreatic mass suspicious for venous engorgement possibly SMV tumor thrombus.  Multiple liver lesions as noted.      Final Result by Tyrell Gunderson MD (01/03 2008)      Pancreatic head mass measuring approximately 2.4 x 1.6 cm with atrophy of the pancreatic body and tail. IV contrast would be helpful in this setting to better assess for any vascular invasion.      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      Multiple liver lesions consistent with metastatic disease.               Workstation performed: [id number]         IR celiac plexus block    (Results Pending)   IR biopsy liver mass    (Results Pending)         Results from last 7 days   Lab Units 01/04/24  0535 01/03/24  1807   WBC Thousand/uL 9.09 7.75   HEMOGLOBIN g/dL 11.6 12.8   HEMATOCRIT % 36.0 39.0   PLATELETS Thousands/uL 189 193   NEUTROS ABS Thousands/µL  --  5.01         Results from last 7 days   Lab Units 01/04/24  0535 01/03/24  1807   SODIUM mmol/L 134* 129*   POTASSIUM mmol/L 3.8 3.4*   CHLORIDE mmol/L 96 89*   CO2 mmol/L 29 25   ANION GAP mmol/L 9 15   BUN mg/dL 22 26*   CREATININE mg/dL 1.24 1.40*   EGFR ml/min/1.73sq m 40 35   CALCIUM mg/dL 8.5 9.5     Results from last 7 days   Lab Units 01/04/24  0535 01/03/24  1807   AST U/L 21 25   ALT U/L 13 16   ALK PHOS U/L 113* 141*   TOTAL PROTEIN g/dL 5.7* 6.9   ALBUMIN g/dL 3.1* 3.8   TOTAL BILIRUBIN mg/dL 0.49 0.60     Results from last 7 days   Lab Units 01/04/24  1207 01/04/24  0619 01/04/24  0032   POC GLUCOSE mg/dl 186* 181* 229*     Results from last 7 days   Lab Units 01/04/24  0535 01/03/24  1807   GLUCOSE RANDOM mg/dL 193* 318*             Results from last 7 days   Lab Units 01/03/24 2011 01/03/24  1807   HS TNI 0HR ng/L  --  10   HS TNI 2HR ng/L 12  --    HSTNI D2 ng/L 2  --          Results from last 7 days   Lab Units 01/03/24  1807   PROTIME seconds 14.4   INR  1.10   PTT  seconds 28         Results from last 7 days   Lab Units 01/03/24  1807   PROCALCITONIN ng/ml 0.21     Results from last 7 days   Lab Units 01/03/24 2011 01/03/24  1807   LACTIC ACID mmol/L 1.5 2.4*     Results from last 7 days   Lab Units 01/04/24  0535 01/03/24  1807   LIPASE u/L 322* 249*           Results from last 7 days   Lab Units 01/03/24  1807   BLOOD CULTURE  Received in Microbiology Lab. Culture in Progress.  Received in Microbiology Lab. Culture in Progress.     ED Treatment:   Medication Administration from 01/03/2024 1536 to 01/04/2024 1217         Date/Time Order Dose Route Action Comments     01/03/2024 1805 EST multi-electrolyte (PLASMALYTE-A/ISOLYTE-S PH 7.4) IV solution 1,000 mL 1,000 mL Intravenous New Bag --     01/03/2024 1807 EST ondansetron (ZOFRAN) injection 4 mg 4 mg Intravenous Given --     01/03/2024 1812 EST fentaNYL citrate 12.5 mcg 12.5 mcg Intranasal Given --     01/03/2024 2000 EST ampicillin-sulbactam (UNASYN) 3 g in sodium chloride 0.9 % 100 mL IVPB 3 g Intravenous New Bag --     01/03/2024 2007 EST fentanyl citrate (PF) 100 MCG/2ML 25 mcg 25 mcg Intravenous Given --     01/03/2024 2326 EST enoxaparin (LOVENOX) subcutaneous injection 90 mg 90 mg Subcutaneous Given --     01/04/2024 0903 EST ascorbic acid (VITAMIN C) tablet 500 mg 500 mg Oral Given --     01/03/2024 2326 EST sodium chloride 0.9 % infusion 125 mL/hr Intravenous New Bag --     01/04/2024 0622 EST insulin lispro (HumaLOG) 100 units/mL subcutaneous injection 1-6 Units 1 Units Subcutaneous Given --     01/04/2024 0035 EST insulin lispro (HumaLOG) 100 units/mL subcutaneous injection 1-6 Units 2 Units Subcutaneous Given --     01/04/2024 0236 EST HYDROcodone-acetaminophen (NORCO) 5-325 mg per tablet 1 tablet 1 tablet Oral Given --          Past Medical History:   Diagnosis Date    Bereavement, uncomplicated     [id number]  RESOLVED    Coronary artery disease     Diabetes mellitus (HCC)     Hyperlipidemia     Hypertension      Lymphedema     Morbid obesity (HCC)     Myocardial infarction (HCC)      Present on Admission:   Acute pancreatitis   Hypertension   Type 2 diabetes mellitus with stage 3 chronic kidney disease, without long-term current use of insulin, unspecified whether stage 3a or 3b CKD (HCC)   Abdominal pain   Hyponatremia   Acute kidney injury (HCC)      Admitting Diagnosis: Abdominal pain [R10.9]  Age/Sex: 83 y.o. female  Admission Orders:  SCDS  UP and OOB  NPO  Scheduled Medications:  amLODIPine, 5 mg, Oral, Daily  ascorbic acid, 500 mg, Oral, Daily  insulin lispro, 1-6 Units, Subcutaneous, Q6H MIKE  multi-electrolyte, 1,000 mL, Intravenous, Once  pantoprazole, , ,   pantoprazole, , ,       Continuous IV Infusions:  sodium chloride, 125 mL/hr, Intravenous, Continuous      PRN Meds:  acetaminophen, 650 mg, Oral, Q6H PRN  diphenhydrAMINE-zinc acetate, , Topical, TID PRN  HYDROcodone-acetaminophen, 1 tablet, Oral, Q6H PRN  morphine injection, 2 mg, Intravenous, Q4H PRN  ondansetron, 4 mg, Intravenous, Q6H PRN  pantoprazole, , ,   pantoprazole, , ,         INPATIENT CONSULT TO IR  IP CONSULT TO ONCOLOGY    Network Utilization Review Department  ATTENTION: Please call with any questions or concerns to [phone] and carefully listen to the prompts so that you are directed to the right person. All voicemails are confidential.   For Discharge needs, contact Care Management DC Support Team at [phone] opt. [phone]  Send all requests for admission clinical reviews, approved or denied determinations and any other requests to dedicated fax number below belonging to the campus where the patient is receiving treatment. List of dedicated fax numbers for the Facilities:  FACILITY NAME UR FAX NUMBER   ADMISSION DENIALS (Administrative/Medical Necessity) [phone]   DISCHARGE SUPPORT TEAM (NETWORK) [phone]   PARENT CHILD HEALTH (Maternity/NICU/Pediatrics) [phone]   [facility] [phone]   [facility]  [facility] [phone]   [facility] [phone]   [facility] [phone]   [facility] [phone]   [facility] [phone]   [facility] [phone]   [facility] [phone]   [facility] [phone]   [facility] [phone]   [facility] [phone]

## 2024-01-05 ENCOUNTER — TELEPHONE (OUTPATIENT)
Dept: HEMATOLOGY ONCOLOGY | Facility: CLINIC | Age: 84
End: 2024-01-05

## 2024-01-05 ENCOUNTER — APPOINTMENT (INPATIENT)
Dept: CT IMAGING | Facility: HOSPITAL | Age: 84
DRG: 394 | End: 2024-01-05
Payer: COMMERCIAL

## 2024-01-05 PROBLEM — K86.89 PANCREATIC MASS: Status: ACTIVE | Noted: 2024-01-05

## 2024-01-05 PROBLEM — K55.069 SUPERIOR MESENTERIC VEIN THROMBOSIS (HCC): Status: ACTIVE | Noted: 2024-01-05

## 2024-01-05 LAB
ALBUMIN SERPL BCP-MCNC: 3.1 G/DL (ref 3.5–5)
ALP SERPL-CCNC: 113 U/L (ref 34–104)
ALT SERPL W P-5'-P-CCNC: 12 U/L (ref 7–52)
ANION GAP SERPL CALCULATED.3IONS-SCNC: 12 MMOL/L
AST SERPL W P-5'-P-CCNC: 23 U/L (ref 13–39)
BILIRUB SERPL-MCNC: 0.43 MG/DL (ref 0.2–1)
BUN SERPL-MCNC: 19 MG/DL (ref 5–25)
CALCIUM ALBUM COR SERPL-MCNC: 8.9 MG/DL (ref 8.3–10.1)
CALCIUM SERPL-MCNC: 8.2 MG/DL (ref 8.4–10.2)
CHLORIDE SERPL-SCNC: 100 MMOL/L (ref 96–108)
CO2 SERPL-SCNC: 24 MMOL/L (ref 21–32)
CREAT SERPL-MCNC: 0.86 MG/DL (ref 0.6–1.3)
ERYTHROCYTE [DISTWIDTH] IN BLOOD BY AUTOMATED COUNT: 12.2 % (ref 11.6–15.1)
GFR SERPL CREATININE-BSD FRML MDRD: 63 ML/MIN/1.73SQ M
GLUCOSE SERPL-MCNC: 146 MG/DL (ref 65–140)
GLUCOSE SERPL-MCNC: 155 MG/DL (ref 65–140)
GLUCOSE SERPL-MCNC: 161 MG/DL (ref 65–140)
GLUCOSE SERPL-MCNC: 222 MG/DL (ref 65–140)
GLUCOSE SERPL-MCNC: 255 MG/DL (ref 65–140)
HCT VFR BLD AUTO: 37.9 % (ref 34.8–46.1)
HGB BLD-MCNC: 11.9 G/DL (ref 11.5–15.4)
MCH RBC QN AUTO: 29.1 PG (ref 26.8–34.3)
MCHC RBC AUTO-ENTMCNC: 31.4 G/DL (ref 31.4–37.4)
MCV RBC AUTO: 93 FL (ref 82–98)
PLATELET # BLD AUTO: 172 THOUSANDS/UL (ref 149–390)
PMV BLD AUTO: 11.5 FL (ref 8.9–12.7)
POTASSIUM SERPL-SCNC: 3.5 MMOL/L (ref 3.5–5.3)
PROT SERPL-MCNC: 5.7 G/DL (ref 6.4–8.4)
RBC # BLD AUTO: 4.09 MILLION/UL (ref 3.81–5.12)
SODIUM SERPL-SCNC: 136 MMOL/L (ref 135–147)
WBC # BLD AUTO: 6.05 THOUSAND/UL (ref 4.31–10.16)

## 2024-01-05 PROCEDURE — 86301 IMMUNOASSAY TUMOR CA 19-9: CPT | Performed by: INTERNAL MEDICINE

## 2024-01-05 PROCEDURE — 88342 IMHCHEM/IMCYTCHM 1ST ANTB: CPT | Performed by: PATHOLOGY

## 2024-01-05 PROCEDURE — 99153 MOD SED SAME PHYS/QHP EA: CPT

## 2024-01-05 PROCEDURE — 88313 SPECIAL STAINS GROUP 2: CPT | Performed by: PATHOLOGY

## 2024-01-05 PROCEDURE — 80053 COMPREHEN METABOLIC PANEL: CPT | Performed by: INTERNAL MEDICINE

## 2024-01-05 PROCEDURE — 82948 REAGENT STRIP/BLOOD GLUCOSE: CPT

## 2024-01-05 PROCEDURE — 99232 SBSQ HOSP IP/OBS MODERATE 35: CPT | Performed by: INTERNAL MEDICINE

## 2024-01-05 PROCEDURE — 99152 MOD SED SAME PHYS/QHP 5/>YRS: CPT

## 2024-01-05 PROCEDURE — 47000 NEEDLE BIOPSY OF LIVER PERQ: CPT

## 2024-01-05 PROCEDURE — 77012 CT SCAN FOR NEEDLE BIOPSY: CPT | Performed by: RADIOLOGY

## 2024-01-05 PROCEDURE — 88341 IMHCHEM/IMCYTCHM EA ADD ANTB: CPT | Performed by: PATHOLOGY

## 2024-01-05 PROCEDURE — 76937 US GUIDE VASCULAR ACCESS: CPT

## 2024-01-05 PROCEDURE — 77012 CT SCAN FOR NEEDLE BIOPSY: CPT

## 2024-01-05 PROCEDURE — 99152 MOD SED SAME PHYS/QHP 5/>YRS: CPT | Performed by: RADIOLOGY

## 2024-01-05 PROCEDURE — 85027 COMPLETE CBC AUTOMATED: CPT | Performed by: INTERNAL MEDICINE

## 2024-01-05 PROCEDURE — 47000 NEEDLE BIOPSY OF LIVER PERQ: CPT | Performed by: RADIOLOGY

## 2024-01-05 PROCEDURE — 88307 TISSUE EXAM BY PATHOLOGIST: CPT | Performed by: PATHOLOGY

## 2024-01-05 RX ORDER — INSULIN LISPRO 100 [IU]/ML
1-6 INJECTION, SOLUTION INTRAVENOUS; SUBCUTANEOUS
Status: DISCONTINUED | OUTPATIENT
Start: 2024-01-05 | End: 2024-01-06 | Stop reason: HOSPADM

## 2024-01-05 RX ORDER — MIDAZOLAM HYDROCHLORIDE 2 MG/2ML
INJECTION, SOLUTION INTRAMUSCULAR; INTRAVENOUS AS NEEDED
Status: COMPLETED | OUTPATIENT
Start: 2024-01-05 | End: 2024-01-05

## 2024-01-05 RX ORDER — LIDOCAINE HYDROCHLORIDE 10 MG/ML
INJECTION, SOLUTION EPIDURAL; INFILTRATION; INTRACAUDAL; PERINEURAL AS NEEDED
Status: COMPLETED | OUTPATIENT
Start: 2024-01-05 | End: 2024-01-05

## 2024-01-05 RX ORDER — INSULIN LISPRO 100 [IU]/ML
1-5 INJECTION, SOLUTION INTRAVENOUS; SUBCUTANEOUS
Status: DISCONTINUED | OUTPATIENT
Start: 2024-01-05 | End: 2024-01-06 | Stop reason: HOSPADM

## 2024-01-05 RX ORDER — ENOXAPARIN SODIUM 100 MG/ML
1 INJECTION SUBCUTANEOUS EVERY 12 HOURS SCHEDULED
Status: DISCONTINUED | OUTPATIENT
Start: 2024-01-05 | End: 2024-01-06 | Stop reason: HOSPADM

## 2024-01-05 RX ORDER — FENTANYL CITRATE 50 UG/ML
INJECTION, SOLUTION INTRAMUSCULAR; INTRAVENOUS AS NEEDED
Status: COMPLETED | OUTPATIENT
Start: 2024-01-05 | End: 2024-01-05

## 2024-01-05 RX ADMIN — SODIUM CHLORIDE 125 ML/HR: 0.9 INJECTION, SOLUTION INTRAVENOUS at 10:30

## 2024-01-05 RX ADMIN — MIDAZOLAM HYDROCHLORIDE 1 MG: 1 INJECTION, SOLUTION INTRAMUSCULAR; INTRAVENOUS at 09:48

## 2024-01-05 RX ADMIN — ENOXAPARIN SODIUM 80 MG: 80 INJECTION SUBCUTANEOUS at 14:48

## 2024-01-05 RX ADMIN — IOHEXOL 100 ML: 350 INJECTION, SOLUTION INTRAVENOUS at 09:51

## 2024-01-05 RX ADMIN — ENOXAPARIN SODIUM 80 MG: 80 INJECTION SUBCUTANEOUS at 21:13

## 2024-01-05 RX ADMIN — MIDAZOLAM HYDROCHLORIDE 1 MG: 1 INJECTION, SOLUTION INTRAMUSCULAR; INTRAVENOUS at 09:59

## 2024-01-05 RX ADMIN — INSULIN LISPRO 1 UNITS: 100 INJECTION, SOLUTION INTRAVENOUS; SUBCUTANEOUS at 11:47

## 2024-01-05 RX ADMIN — FENTANYL CITRATE 50 MCG: 50 INJECTION, SOLUTION INTRAMUSCULAR; INTRAVENOUS at 09:48

## 2024-01-05 RX ADMIN — FENTANYL CITRATE 50 MCG: 50 INJECTION, SOLUTION INTRAMUSCULAR; INTRAVENOUS at 09:59

## 2024-01-05 RX ADMIN — SODIUM CHLORIDE 125 ML/HR: 0.9 INJECTION, SOLUTION INTRAVENOUS at 01:38

## 2024-01-05 RX ADMIN — MIDAZOLAM HYDROCHLORIDE 1 MG: 1 INJECTION, SOLUTION INTRAMUSCULAR; INTRAVENOUS at 09:34

## 2024-01-05 RX ADMIN — FENTANYL CITRATE 50 MCG: 50 INJECTION, SOLUTION INTRAMUSCULAR; INTRAVENOUS at 09:34

## 2024-01-05 RX ADMIN — LIDOCAINE HYDROCHLORIDE 10 ML: 10 INJECTION, SOLUTION EPIDURAL; INFILTRATION; INTRACAUDAL; PERINEURAL at 10:03

## 2024-01-05 RX ADMIN — INSULIN LISPRO 2 UNITS: 100 INJECTION, SOLUTION INTRAVENOUS; SUBCUTANEOUS at 21:13

## 2024-01-05 RX ADMIN — INSULIN LISPRO 2 UNITS: 100 INJECTION, SOLUTION INTRAVENOUS; SUBCUTANEOUS at 17:03

## 2024-01-05 NOTE — PROGRESS NOTES
[facility]  Progress Note  Name: Candi Carpenter I  MRN: [id number]  Unit/Bed#: -01 I Date of Admission: 1/3/2024   Date of Service: 1/5/2024 I Hospital Day: 2    Assessment/Plan   * Abdominal pain  Assessment & Plan  Patient presented with a 2-week history of diffuse abdominal discomfort, nausea, and vomiting  Patient is currently pain free  CT abd/pelvis (1/3/24): Pancreatic head mass measuring approximately 2.4 x 1.6 cm with atrophy of the pancreatic body and tail. 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 Multiple liver lesions consistent with metastatic disease.   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  Status post liver biopsy 1/5/2024  CA 19-9 pending  Will need outpatient oncology follow-up in 1 to 2 weeks    Superior mesenteric vein thrombosis (HCC)  Assessment & Plan  Imaging as above  Will resume therapeutic Lovenox as the patient has no further procedures planned at this time  Will discuss with hematology/oncology regarding anticoagulation for discharge    Acute kidney injury (HCC)  Assessment & Plan  Present on arrival and has resolved at this time  Will discontinue IV fluid resuscitation and resume diet  Continue careful monitoring while inpatient    Type 2 diabetes mellitus with stage 3 chronic kidney disease, without long-term current use of insulin, unspecified whether stage 3a or 3b CKD (HCC)  Assessment & Plan  Lab Results   Component Value Date    HGBA1C 11.6 (H) 10/17/2023       Recent Labs     01/04/24  1638 01/04/24 2029 01/05/24  0624 01/05/24  1147   POCGLU 133 117 146* 161*         Blood Sugar Average: Last 72 hrs:  (P)  [id number]    Home oral medications held at this time  Continue Accu-Cheks, corrective sliding scale insulin, and hypoglycemic protocol  Carb-controlled diet    Hypertension  Assessment & Plan  Blood pressure is adequately controlled at this time  Continue Amlodipine 5mg daily  Continue to monitor BP trends and titrate medications as indicated         VTE Pharmacologic Prophylaxis: VTE Score: 7 High Risk (Score >/= 5) - Pharmacological DVT Prophylaxis Ordered: enoxaparin (Lovenox). Sequential Compression Devices Ordered.    Mobility:   Basic Mobility Inpatient Raw Score: 17  JH-HLM Goal: 5: Stand one or more mins  JH-HLM Achieved: 5: Stand (1 or more minutes)  HLM Goal achieved. Continue to encourage appropriate mobility.    Patient Centered Rounds: I performed bedside rounds with nursing staff today.   Discussions with Specialists or Other Care Team Provider: Oncology, Nursing, and CM    Education and Discussions with Family / Patient:  Patient updated on plan of care.     Total Time Spent on Date of Encounter in care of patient: 40 mins. This time was spent on one or more of the following: performing physical exam; counseling and coordination of care; obtaining or reviewing history; documenting in the medical record; reviewing/ordering tests, medications or procedures; communicating with other healthcare professionals and discussing with patient's family/caregivers.    Current Length of Stay: 2 day(s)  Current Patient Status: Inpatient   Certification Statement: The patient will continue to require additional inpatient hospital stay due to likely newly diagnosed pancreatic cancer with SMV thrombus.  Discharge Plan: Anticipate discharge tomorrow to home.    Code Status: Level 1 - Full Code    Subjective:   Patient resting in bed without any acute complaints.  She underwent liver biopsy this morning without immediate complications and denies any further abdominal pain at this time.  There were no overnight  events reported by nursing.    Objective:     Vitals:   Temp (24hrs), Av.4 °F (36.3 °C), Min:96.3 °F (35.7 °C), Max:98 °F (36.7 °C)    Temp:  [96.3 °F (35.7 °C)-98 °F (36.7 °C)] 98 °F (36.7 °C)  HR:  [56-80] 69  Resp:  [16-19] 18  BP: ()/(47-95) 111/49  SpO2:  [89 %-100 %] 96 %  Body mass index is 34.87 kg/m².     Input and Output Summary (last 24 hours):     Intake/Output Summary (Last 24 hours) at 2024 1328  Last data filed at 2024 1030  Gross per 24 hour   Intake 3000 ml   Output --   Net 3000 ml       Physical Exam:   Physical Exam  Vitals and nursing note reviewed.   Constitutional:       General: She is not in acute distress.     Appearance: She is well-developed. She is obese.   HENT:      Head: Normocephalic and atraumatic.      Mouth/Throat:      Mouth: Mucous membranes are moist.      Pharynx: Oropharynx is clear.   Eyes:      Extraocular Movements: Extraocular movements intact.      Conjunctiva/sclera: Conjunctivae normal.   Cardiovascular:      Rate and Rhythm: Normal rate and regular rhythm.      Pulses: Normal pulses.      Heart sounds: Normal heart sounds. No murmur heard.  Pulmonary:      Effort: Pulmonary effort is normal. No respiratory distress.      Breath sounds: Normal breath sounds. No wheezing.   Abdominal:      General: Bowel sounds are normal. There is no distension.      Palpations: Abdomen is soft.      Tenderness: There is no abdominal tenderness.   Musculoskeletal:         General: No swelling. Normal range of motion.      Cervical back: Normal range of motion and neck supple.   Skin:     General: Skin is warm and dry.      Capillary Refill: Capillary refill takes less than 2 seconds.   Neurological:      General: No focal deficit present.      Mental Status: She is alert and oriented to person, place, and time. Mental status is at baseline.   Psychiatric:         Mood and Affect: Mood normal.         Behavior: Behavior normal.         Judgment: Judgment normal.           Labs:  Results from last 7 days   Lab Units 01/05/24  0541 01/04/24  0535 01/03/24  1807   WBC Thousand/uL 6.05   < > 7.75   HEMOGLOBIN g/dL 11.9   < > 12.8   HEMATOCRIT % 37.9   < > 39.0   PLATELETS Thousands/uL 172   < > 193   NEUTROS PCT %  --   --  65   LYMPHS PCT %  --   --  26   MONOS PCT %  --   --  8   EOS PCT %  --   --  1    < > = values in this interval not displayed.     Results from last 7 days   Lab Units 01/05/24  0541   SODIUM mmol/L 136   POTASSIUM mmol/L 3.5   CHLORIDE mmol/L 100   CO2 mmol/L 24   BUN mg/dL 19   CREATININE mg/dL 0.86   ANION GAP mmol/L 12   CALCIUM mg/dL 8.2*   ALBUMIN g/dL 3.1*   TOTAL BILIRUBIN mg/dL 0.43   ALK PHOS U/L 113*   ALT U/L 12   AST U/L 23   GLUCOSE RANDOM mg/dL 155*     Results from last 7 days   Lab Units 01/03/24  1807   INR  1.10     Results from last 7 days   Lab Units 01/05/24  1147 01/05/24  0624 01/04/24  2029 01/04/24  1638 01/04/24  1207 01/04/24  0619 01/04/24  0032   POC GLUCOSE mg/dl 161* 146* 117 133 186* 181* 229*         Results from last 7 days   Lab Units 01/03/24 2011 01/03/24  1807   LACTIC ACID mmol/L 1.5 2.4*   PROCALCITONIN ng/ml  --  0.21       Lines/Drains:  Invasive Devices       Peripheral Intravenous Line  Duration             Peripheral IV 01/03/24 Right Antecubital 1 day              Drain  Duration             External Urinary Catheter <1 day                    Imaging: No new imaging.     Recent Cultures (last 7 days):   Results from last 7 days   Lab Units 01/04/24  0226 01/03/24  1807   BLOOD CULTURE   --  No Growth at 24 hrs.  No Growth at 24 hrs.   C DIFF TOXIN B BY PCR  Negative  --        Last 24 Hours Medication List:   Current Facility-Administered Medications   Medication Dose Route Frequency Provider Last Rate    acetaminophen  650 mg Oral Q6H PRN Charlie Avelar PA-C      amLODIPine  5 mg Oral Daily Charlie Avelar PA-C      ascorbic acid  500 mg Oral Daily Charlie Avelar PA-C      diphenhydrAMINE-zinc acetate   Topical TID  PRN Charlie Avelar PA-C      enoxaparin  1 mg/kg Subcutaneous Q12H MIKE Xiomara Mccormick,       HYDROcodone-acetaminophen  1 tablet Oral Q6H PRN Charlie Avelar PA-C      insulin lispro  1-5 Units Subcutaneous HS Xiomara Mccormick DO      insulin lispro  1-6 Units Subcutaneous TID AC Xiomara Mccormick DO      morphine injection  2 mg Intravenous Q4H PRN Charlie Avelar PA-C      multi-electrolyte  1,000 mL Intravenous Once Anibal Millan,       ondansetron  4 mg Intravenous Q6H PRN Charlie Avelar PA-C          Today, Patient Was Seen By: Xiomara Mccormick DO    **Please Note: This note may have been constructed using a voice recognition system.**

## 2024-01-05 NOTE — ASSESSMENT & PLAN NOTE
Patient presented with a 2-week history of diffuse abdominal discomfort, nausea, and vomiting  Patient is currently pain free  CT abd/pelvis (1/3/24): Pancreatic head mass measuring approximately 2.4 x 1.6 cm with atrophy of the pancreatic body and tail. 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 Multiple liver lesions consistent with metastatic disease.   Was offered celiac plexus block with IR, however, has been well-controlled on oral Norco  Continue supportive care

## 2024-01-05 NOTE — ASSESSMENT & PLAN NOTE
Imaging as above  Will resume therapeutic Lovenox as the patient has no further procedures planned at this time  Will discuss with hematology/oncology regarding anticoagulation for discharge

## 2024-01-05 NOTE — ASSESSMENT & PLAN NOTE
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

## 2024-01-05 NOTE — ASSESSMENT & PLAN NOTE
Lab Results   Component Value Date    HGBA1C 11.6 (H) 10/17/2023       Recent Labs     01/04/24  1638 01/04/24 2029 01/05/24 0624 01/05/24  1147   POCGLU 133 117 146* 161*         Blood Sugar Average: Last 72 hrs:  (P) [id number]    Home oral medications held at this time  Continue Accu-Cheks, corrective sliding scale insulin, and hypoglycemic protocol  Carb-controlled diet

## 2024-01-05 NOTE — PLAN OF CARE
Problem: Prexisting or High Potential for Compromised Skin Integrity  Goal: Skin integrity is maintained or improved  Description: INTERVENTIONS:  - Identify patients at risk for skin breakdown  - Assess and monitor skin integrity  - Assess and monitor nutrition and hydration status  - Monitor labs   - Assess for incontinence   - Turn and reposition patient  - Assist with mobility/ambulation  - Relieve pressure over bony prominences  - Avoid friction and shearing  - Provide appropriate hygiene as needed including keeping skin clean and dry  - Evaluate need for skin moisturizer/barrier cream  - Collaborate with interdisciplinary team   - Patient/family teaching  - Consider wound care consult   Outcome: Progressing     Problem: PAIN - ADULT  Goal: Verbalizes/displays adequate comfort level or baseline comfort level  Description: Interventions:  - Encourage patient to monitor pain and request assistance  - Assess pain using appropriate pain scale  - Administer analgesics based on type and severity of pain and evaluate response  - Implement non-pharmacological measures as appropriate and evaluate response  - Consider cultural and social influences on pain and pain management  - Notify physician/advanced practitioner if interventions unsuccessful or patient reports new pain  Outcome: Progressing     Problem: INFECTION - ADULT  Goal: Absence or prevention of progression during hospitalization  Description: INTERVENTIONS:  - Assess and monitor for signs and symptoms of infection  - Monitor lab/diagnostic results  - Monitor all insertion sites, i.e. indwelling lines, tubes, and drains  - Monitor endotracheal if appropriate and nasal secretions for changes in amount and color  - [place] appropriate cooling/warming therapies per order  - Administer medications as ordered  - Instruct and encourage patient and family to use good hand hygiene technique  - Identify and instruct in appropriate isolation precautions for  identified infection/condition  Outcome: Progressing  Goal: Absence of fever/infection during neutropenic period  Description: INTERVENTIONS:  - Monitor WBC    Outcome: Progressing     Problem: SAFETY ADULT  Goal: Patient will remain free of falls  Description: INTERVENTIONS:  - Educate patient/family on patient safety including physical limitations  - Instruct patient to call for assistance with activity   - Consult OT/PT to assist with strengthening/mobility   - Keep Call bell within reach  - Keep bed low and locked with side rails adjusted as appropriate  - Keep care items and personal belongings within reach  - Initiate and maintain comfort rounds  - Make Fall Risk Sign visible to staff  - Offer Toileting every  Hours, in advance of need  - Initiate/Maintain alarm  - Obtain necessary fall risk management equipment:   - Apply yellow socks and bracelet for high fall risk patients  - Consider moving patient to room near nurses station  Outcome: Progressing  Goal: Maintain or return to baseline ADL function  Description: INTERVENTIONS:  -  Assess patient's ability to carry out ADLs; assess patient's baseline for ADL function and identify physical deficits which impact ability to perform ADLs (bathing, care of mouth/teeth, toileting, grooming, dressing, etc.)  - Assess/evaluate cause of self-care deficits   - Assess range of motion  - Assess patient's mobility; develop plan if impaired  - Assess patient's need for assistive devices and provide as appropriate  - Encourage maximum independence but intervene and supervise when necessary  - Involve family in performance of ADLs  - Assess for home care needs following discharge   - Consider OT consult to assist with ADL evaluation and planning for discharge  - Provide patient education as appropriate  Outcome: Progressing  Goal: Maintains/Returns to pre admission functional level  Description: INTERVENTIONS:  - Perform AM-PAC 6 Click Basic Mobility/ Daily Activity  assessment daily.  - Set and communicate daily mobility goal to care team and patient/family/caregiver.   - Collaborate with rehabilitation services on mobility goals if consulted  - Perform Range of Motion  times a day.  - Reposition patient every  hours.  - Dangle patient  times a day  - Stand patient  times a day  - Ambulate patient  times a day  - Out of bed to chair  times a day   - Out of bed for meals times a day  - Out of bed for toileting  - Record patient progress and toleration of activity level   Outcome: Progressing     Problem: DISCHARGE PLANNING  Goal: Discharge to home or other facility with appropriate resources  Description: INTERVENTIONS:  - Identify barriers to discharge w/patient and caregiver  - Arrange for needed discharge resources and transportation as appropriate  - Identify discharge learning needs (meds, wound care, etc.)  - Arrange for interpretive services to assist at discharge as needed  - Refer to Case Management Department for coordinating discharge planning if the patient needs post-hospital services based on physician/advanced practitioner order or complex needs related to functional status, cognitive ability, or social support system  Outcome: Progressing     Problem: Knowledge Deficit  Goal: Patient/family/caregiver demonstrates understanding of disease process, treatment plan, medications, and discharge instructions  Description: Complete learning assessment and assess knowledge base.  Interventions:  - Provide teaching at level of understanding  - Provide teaching via preferred learning methods  Outcome: Progressing

## 2024-01-05 NOTE — BRIEF OP NOTE (RAD/CATH)
INTERVENTIONAL RADIOLOGY PROCEDURE NOTE    Date: 1/5/2024    Procedure: IR BIOPSY LIVER MASS     Preoperative diagnosis:   1. Abdominal pain    2. Acute kidney injury (HCC)    3. Pancreatic mass       Postoperative diagnosis: Same.    Surgeon: Stephen Cuenca MD     Assistant: None. No qualified resident was available.    Blood loss: minimal    Specimens: 18 gauge core x 4    Findings: Successful CT guided liver mass biopsy.    Complications: None immediate.    Anesthesia: conscious sedation

## 2024-01-05 NOTE — ASSESSMENT & PLAN NOTE
Blood pressure is adequately controlled at this time  Continue Amlodipine 5mg daily  Continue to monitor BP trends and titrate medications as indicated

## 2024-01-05 NOTE — DISCHARGE INSTRUCTIONS
[facility]  Interventional Radiology  [phone]           Percutaneous Liver Biopsy   WHAT YOU NEED TO KNOW:   A PLB is a procedure to remove a sample of tissue from your liver. The sample can be sent to a lab and tested for liver disease, cancer, or infection. After the procedure you may have pain and bruising at the biopsy site. You may also have pain in your right shoulder. These symptoms should get better in 48 to 72 hours.    DISCHARGE INSTRUCTIONS:     [facility], [place] and [place] patients,  Contact Interventional Radiology at [phone]   SILVA PATIENTS: Contact Interventional Radiology at [phone]   LUIS PATIENTS: Contact Interventional Radiology at [phone] if:    Fever greater than 101 or chills  You have severe pain in your abdomen.    Your abdomen is larger than usual and feels hard.    Your neck is more swollen and you have trouble swallowing.    You feel weak or dizzy.    Your heart is beating faster than usual.   Your pain does not get better after you take pain medicine.    Your wound is red, swollen, or draining pus.   You have nausea or are vomiting.   Your skin is itchy, swollen, or you have a rash.   You have questions or concerns about your condition or care.  Medicines:   Acetaminophen decreases pain and fever. It is available without a doctor's order. Acetaminophen can cause liver damage if not taken correctly.   Take your home medicine as directed.  Resume your normal diet. Small sips of flat soda will help with mild nausea.  Self-care:   Rest as directed. Do not play sports, exercise, or lift anything heavier than 5 pounds for up to 1 week.     Apply firm, steady pressure if bleeding occurs. A small amount of bleeding from your wound is possible. Apply pressure with a clean gauze or towel for 5 to 10 minutes. Call 911 if bleeding becomes heavy or does not stop.    Ask your healthcare provider when to take your blood thinner or  antiplatelet medicine. You may need to wait 24 to 72 hours to take your medicine. This will prevent bleeding.  Follow up with your healthcare provider as directed: Write down your questions so you remember to ask them during your visits.

## 2024-01-05 NOTE — ASSESSMENT & PLAN NOTE
Present on arrival and has resolved at this time  Will discontinue IV fluid resuscitation and resume diet  Continue careful monitoring while inpatient

## 2024-01-05 NOTE — TELEPHONE ENCOUNTER
"Soft Intake Form   Patient Details   Candi Carpenter     1940     Reason For Appointment   Who is Calling? Risa Montgomery   If not patient, Name?  NA   DID YOU CONFIRM INSURANCE WITH PATIENT? E verified, Routed to finance   Who is the Referring Doctor? Dr. Russell   What is the diagnosis? most likely adenocarcinoma of the head/body of the pancreas stage IV with diffuse hepatic metastasis   Has this diagnosis been confirmed by a biopsy/surgery?    If yes, what is the date it was done? Liver biopsy taken on 1/5   Biopsy done at [facility]?  If not, where was it done? Yes   Was imaging done, and was it done at [facility]?  If not, where was it done? Yes-[facility]   Have you been seen by another Oncologist?  If so, who and where (name of facility, city and state) No   For 2nd Opinions Only: Are you currently undergoing treatment, or are you scheduled to start treatment?  If yes, name of facility, city and state  No   For \"History Of\" only: Have you completed treatment?  NA   Have you had Genetic Testing done in the past?  If so, advise to bring test results to their visit No   Record Gathering Information   Did you advise to have records faxed to [phone]?  NA   Did you advise to have disks sent to the proper address with imaging? (\"History of\" Patients)  5 years of imaging for breast patients-Mammos, US etc NA   Scheduling Information   Did you send new patient paperwork?  Email or mail? NA   What is the best call back number?   (If the [facility] is calling, please use their number) NA   Miscellaneous Information      The patient is scheduled for her HFU appointment with Dr. Braxton on 1/24 at 1500 in the [facility].      "

## 2024-01-06 ENCOUNTER — HOME HEALTH ADMISSION (OUTPATIENT)
Dept: HOME HEALTH SERVICES | Facility: HOME HEALTHCARE | Age: 84
End: 2024-01-06
Payer: COMMERCIAL

## 2024-01-06 VITALS
SYSTOLIC BLOOD PRESSURE: 121 MMHG | WEIGHT: 172.62 LBS | DIASTOLIC BLOOD PRESSURE: 57 MMHG | RESPIRATION RATE: 18 BRPM | HEART RATE: 83 BPM | BODY MASS INDEX: 34.8 KG/M2 | HEIGHT: 59 IN | OXYGEN SATURATION: 94 % | TEMPERATURE: 98.1 F

## 2024-01-06 LAB
ANION GAP SERPL CALCULATED.3IONS-SCNC: 11 MMOL/L
BUN SERPL-MCNC: 10 MG/DL (ref 5–25)
CALCIUM SERPL-MCNC: 8.3 MG/DL (ref 8.4–10.2)
CANCER AG19-9 SERPL-ACNC: 6677 U/ML (ref 0–35)
CHLORIDE SERPL-SCNC: 100 MMOL/L (ref 96–108)
CO2 SERPL-SCNC: 22 MMOL/L (ref 21–32)
CREAT SERPL-MCNC: 0.7 MG/DL (ref 0.6–1.3)
DME PARACHUTE DELIVERY DATE REQUESTED: NORMAL
DME PARACHUTE ITEM DESCRIPTION: NORMAL
DME PARACHUTE ORDER STATUS: NORMAL
DME PARACHUTE SUPPLIER NAME: NORMAL
DME PARACHUTE SUPPLIER PHONE: NORMAL
ERYTHROCYTE [DISTWIDTH] IN BLOOD BY AUTOMATED COUNT: 12.3 % (ref 11.6–15.1)
GFR SERPL CREATININE-BSD FRML MDRD: 81 ML/MIN/1.73SQ M
GLUCOSE SERPL-MCNC: 189 MG/DL (ref 65–140)
GLUCOSE SERPL-MCNC: 201 MG/DL (ref 65–140)
GLUCOSE SERPL-MCNC: 202 MG/DL (ref 65–140)
HCT VFR BLD AUTO: 37.4 % (ref 34.8–46.1)
HGB BLD-MCNC: 11.8 G/DL (ref 11.5–15.4)
MCH RBC QN AUTO: 28.9 PG (ref 26.8–34.3)
MCHC RBC AUTO-ENTMCNC: 31.6 G/DL (ref 31.4–37.4)
MCV RBC AUTO: 92 FL (ref 82–98)
PLATELET # BLD AUTO: 177 THOUSANDS/UL (ref 149–390)
PMV BLD AUTO: 11.1 FL (ref 8.9–12.7)
POTASSIUM SERPL-SCNC: 3.7 MMOL/L (ref 3.5–5.3)
RBC # BLD AUTO: 4.08 MILLION/UL (ref 3.81–5.12)
SODIUM SERPL-SCNC: 133 MMOL/L (ref 135–147)
WBC # BLD AUTO: 7.15 THOUSAND/UL (ref 4.31–10.16)

## 2024-01-06 PROCEDURE — 85027 COMPLETE CBC AUTOMATED: CPT | Performed by: INTERNAL MEDICINE

## 2024-01-06 PROCEDURE — 80048 BASIC METABOLIC PNL TOTAL CA: CPT | Performed by: INTERNAL MEDICINE

## 2024-01-06 PROCEDURE — 82948 REAGENT STRIP/BLOOD GLUCOSE: CPT

## 2024-01-06 PROCEDURE — 99239 HOSP IP/OBS DSCHRG MGMT >30: CPT | Performed by: INTERNAL MEDICINE

## 2024-01-06 RX ORDER — ONDANSETRON 4 MG/1
4 TABLET, ORALLY DISINTEGRATING ORAL EVERY 6 HOURS PRN
Qty: 20 TABLET | Refills: 0 | Status: SHIPPED | OUTPATIENT
Start: 2024-01-06 | End: 2024-01-17

## 2024-01-06 RX ORDER — HYDROCODONE BITARTRATE AND ACETAMINOPHEN 5; 325 MG/1; MG/1
1 TABLET ORAL EVERY 6 HOURS PRN
Qty: 28 TABLET | Refills: 0 | Status: SHIPPED | OUTPATIENT
Start: 2024-01-06 | End: 2024-01-17

## 2024-01-06 RX ADMIN — OXYCODONE HYDROCHLORIDE AND ACETAMINOPHEN 500 MG: 500 TABLET ORAL at 08:13

## 2024-01-06 RX ADMIN — AMLODIPINE BESYLATE 5 MG: 5 TABLET ORAL at 08:13

## 2024-01-06 RX ADMIN — INSULIN LISPRO 1 UNITS: 100 INJECTION, SOLUTION INTRAVENOUS; SUBCUTANEOUS at 08:13

## 2024-01-06 RX ADMIN — HYDROCODONE BITARTRATE AND ACETAMINOPHEN 1 TABLET: 5; 325 TABLET ORAL at 05:44

## 2024-01-06 RX ADMIN — ENOXAPARIN SODIUM 80 MG: 80 INJECTION SUBCUTANEOUS at 08:13

## 2024-01-06 NOTE — CASE MANAGEMENT
Case Management Assessment & Discharge Planning Note    Patient name Candi Carpenter  Location /-01 MRN [id number]  : 1940 Date 2024       Current Admission Date: 1/3/2024  Current Admission Diagnosis:Abdominal pain   Patient Active Problem List    Diagnosis Date Noted    Pancreatic mass 2024    Superior mesenteric vein thrombosis (HCC) 2024    Acute pancreatitis 2024    Abdominal pain 2024    Hyponatremia 2024    Acute kidney injury (HCC) 2024    Lactic acidosis 2024    Callus 2023    Onychomycosis of toenail 2023    Trachyonychia 2023    Eczema 2023    Stage 3 chronic kidney disease, unspecified whether stage 3a or 3b CKD (HCC) 2023    Pruritic dermatitis 2023    Vaccination not carried out because of patient refusal 2021    Cotton wool exudates 06/15/2021    Coronary artery disease involving native coronary artery of native heart without angina pectoris 2020    Chronic right-sided low back pain with right-sided sciatica 2020    Type 2 diabetes mellitus with stage 3 chronic kidney disease, without long-term current use of insulin, unspecified whether stage 3a or 3b CKD (HCC)     Morbid obesity, unspecified obesity type ([facility]) 10/05/2016    Hyperlipidemia 2013    Allergic rhinitis 2013    Hypertension 2012    Esophageal reflux 2012      LOS (days): 2  Geometric Mean LOS (GMLOS) (days):   Days to GMLOS:     OBJECTIVE:    Risk of Unplanned Readmission Score: 14.14         Current admission status: Inpatient  Referral Reason: Other (d/c planning)    Preferred Pharmacy:   [facility] - [place] [address] [address] [place]  [address] [facility] [address]  Phone: [phone] Fax: [phone]    Primary Care Provider: Ming Paredes MD    Primary Insurance: "[employer] [facility]  Secondary Insurance:     ASSESSMENT:  Active Health Care Proxies        Cal Narvaez [facility] Representative - Child   Primary Phone: [phone] (Home)                 Advance Directives  Does patient have a Health Care POA?: No  Was patient offered paperwork?: Yes (paperwork was given)  Does patient have Advance Directives?: No  Was patient offered paperwork?: Yes (paperwork was given)         Readmission Root Cause  30 Day Readmission: No    Patient Information  Admitted from:: Home  Mental Status: Alert  During Assessment patient was accompanied by: Not accompanied during assessment  Assessment information provided by:: Son, Patient  Primary Caregiver: Self  Support Systems: Son, Friend  County of Residence: [place]  What city do you live in?: [place]  Home entry access options. Select all that apply.: Stairs  Number of steps to enter home.: 3  Do the steps have railings?: Yes  Type of Current Residence: [facility]  Living Arrangements: Lives w/ Friend  Is patient a ?: No    Activities of Daily Living Prior to Admission  Functional Status: Independent  Completes ADLs independently?: Yes  Ambulates independently?: Yes  Does patient use assisted devices?: Yes  Assisted Devices (DME) used: Straight Cane  Does patient currently own DME?: Yes  What DME does the patient currently own?: Straight Cane, Walker, Shower Chair  Does patient have a history of Outpatient Therapy (PT/OT)?: Yes  Does the patient have a history of Short-Term Rehab?: No  Does patient have a history of HHC?: Yes ([facility] hhc)  Does patient currently have HHC?: No         Patient Information Continued  Income Source: Pension/intermediate  Does patient have prescription coverage?: Yes ([employer])  Does patient have a history of substance abuse?: No  Does patient have a history of Mental Health Diagnosis?: No         Means of Transportation  Means of Transport to Appts:: Friends      Housing Stability: Low Risk  (1/5/2024)    Housing Stability Vital Sign     Unable to Pay for Housing in the Last Year: No      Number of Places Lived in the Last Year: 1     Unstable Housing in the Last Year: No   Food Insecurity: No Food Insecurity (1/5/2024)    Hunger Vital Sign     Worried About Running Out of Food in the Last Year: Never true     Ran Out of Food in the Last Year: Never true   Transportation Needs: No Transportation Needs (1/5/2024)    PRAPARE - Transportation     Lack of Transportation (Medical): No     Lack of Transportation (Non-Medical): No   Utilities: Not At Risk (1/5/2024)    C Utilities     Threatened with loss of utilities: No       DISCHARGE DETAILS:    Discharge planning discussed with:: patient & friend at the bedside & son called at 13:06 pm  & I was asked to call back at 2pm - cm called 14:05pm  Freedom of Choice: Yes  Comments - Freedom of Choice: pt denies any d/c needs  CM contacted family/caregiver?: Yes             Contacts  Patient Contacts: Cal Narvaez Son)   Dion (friend ) [phone]  Relationship to Patient:: Other (Comment) (son & friend that lives with the pt)  Contact Method: Phone  Phone Number: [phone]  Reason/Outcome: Discharge Planning    Requested Home Health Care         Is the patient interested in HHC at discharge?: No    DME Referral Provided  Referral made for DME?: No    Other Referral/Resources/Interventions Provided:  Referral Comments: pt denies any d/c needs- pt went for a liver biopsy today  - cm will continue to follow and assess for any additonal d/c needs  oncology to be consulted for anti coagulation medication to be d/c on- may need a price check    Would you like to participate in our [facility] service program?  : No - Declined    Treatment Team Recommendation: Home (home with friend & outpt follow up- friend)

## 2024-01-06 NOTE — NURSING NOTE
Discharge instructions reviewed with patient, patient's spouse, and patient's son and daughter in law.    Patient discharged home with spouse.

## 2024-01-06 NOTE — PLAN OF CARE
Problem: Prexisting or High Potential for Compromised Skin Integrity  Goal: Skin integrity is maintained or improved  Description: INTERVENTIONS:  - Identify patients at risk for skin breakdown  - Assess and monitor skin integrity  - Assess and monitor nutrition and hydration status  - Monitor labs   - Assess for incontinence   - Turn and reposition patient  - Assist with mobility/ambulation  - Relieve pressure over bony prominences  - Avoid friction and shearing  - Provide appropriate hygiene as needed including keeping skin clean and dry  - Evaluate need for skin moisturizer/barrier cream  - Collaborate with interdisciplinary team   - Patient/family teaching  - Consider wound care consult   1/6/2024 1142 by Jaylin Bynum RN  Outcome: Adequate for Discharge  1/6/2024 0811 by Jaylin Bynum RN  Outcome: Progressing     Problem: PAIN - ADULT  Goal: Verbalizes/displays adequate comfort level or baseline comfort level  Description: Interventions:  - Encourage patient to monitor pain and request assistance  - Assess pain using appropriate pain scale  - Administer analgesics based on type and severity of pain and evaluate response  - Implement non-pharmacological measures as appropriate and evaluate response  - Consider cultural and social influences on pain and pain management  - Notify physician/advanced practitioner if interventions unsuccessful or patient reports new pain  1/6/2024 1142 by Jaylin Bynum RN  Outcome: Adequate for Discharge  1/6/2024 0811 by Jaylin Bynum RN  Outcome: Progressing     Problem: INFECTION - ADULT  Goal: Absence or prevention of progression during hospitalization  Description: INTERVENTIONS:  - Assess and monitor for signs and symptoms of infection  - Monitor lab/diagnostic results  - Monitor all insertion sites, i.e. indwelling lines, tubes, and drains  - Monitor endotracheal if appropriate and nasal secretions for changes in amount and color  - [place] appropriate cooling/warming therapies  per order  - Administer medications as ordered  - Instruct and encourage patient and family to use good hand hygiene technique  - Identify and instruct in appropriate isolation precautions for identified infection/condition  1/6/2024 1142 by Jaylin Bynum RN  Outcome: Adequate for Discharge  1/6/2024 0811 by Jaylin Bynum RN  Outcome: Progressing  Goal: Absence of fever/infection during neutropenic period  Description: INTERVENTIONS:  - Monitor WBC    1/6/2024 1142 by Jaylin Bynum RN  Outcome: Adequate for Discharge  1/6/2024 0811 by Jaylin Bynum RN  Outcome: Progressing     Problem: SAFETY ADULT  Goal: Patient will remain free of falls  Description: INTERVENTIONS:  - Educate patient/family on patient safety including physical limitations  - Instruct patient to call for assistance with activity   - Consult OT/PT to assist with strengthening/mobility   - Keep Call bell within reach  - Keep bed low and locked with side rails adjusted as appropriate  - Keep care items and personal belongings within reach  - Initiate and maintain comfort rounds  - Make Fall Risk Sign visible to staff  - Offer Toileting every 2 Hours, in advance of need  - Initiate/Maintain bed alarm  - Obtain necessary fall risk management equipment:   - Apply yellow socks and bracelet for high fall risk patients  - Consider moving patient to room near nurses station  1/6/2024 1142 by Jaylin Bynum RN  Outcome: Adequate for Discharge  1/6/2024 0811 by Jaylin Bynum RN  Outcome: Progressing  Goal: Maintain or return to baseline ADL function  Description: INTERVENTIONS:  -  Assess patient's ability to carry out ADLs; assess patient's baseline for ADL function and identify physical deficits which impact ability to perform ADLs (bathing, care of mouth/teeth, toileting, grooming, dressing, etc.)  - Assess/evaluate cause of self-care deficits   - Assess range of motion  - Assess patient's mobility; develop plan if impaired  - Assess patient's need for assistive devices  and provide as appropriate  - Encourage maximum independence but intervene and supervise when necessary  - Involve family in performance of ADLs  - Assess for home care needs following discharge   - Consider OT consult to assist with ADL evaluation and planning for discharge  - Provide patient education as appropriate  1/6/2024 1142 by Jaylin Bynum RN  Outcome: Adequate for Discharge  1/6/2024 0811 by Jaylin Bynum RN  Outcome: Progressing  Goal: Maintains/Returns to pre admission functional level  Description: INTERVENTIONS:  - Perform AM-PAC 6 Click Basic Mobility/ Daily Activity assessment daily.  - Set and communicate daily mobility goal to care team and patient/family/caregiver.   - Collaborate with rehabilitation services on mobility goals if consulted  - Perform Range of Motion 3 times a day.  - Reposition patient every 2 hours.  - Dangle patient 3 times a day  - Stand patient 3 times a day  - Ambulate patient 3 times a day  - Out of bed to chair 3 times a day   - Out of bed for meals 3 times a day  - Out of bed for toileting  - Record patient progress and toleration of activity level   1/6/2024 1142 by Jaylin Bynum RN  Outcome: Adequate for Discharge  1/6/2024 0811 by Jaylin Bynum RN  Outcome: Progressing     Problem: DISCHARGE PLANNING  Goal: Discharge to home or other facility with appropriate resources  Description: INTERVENTIONS:  - Identify barriers to discharge w/patient and caregiver  - Arrange for needed discharge resources and transportation as appropriate  - Identify discharge learning needs (meds, wound care, etc.)  - Arrange for interpretive services to assist at discharge as needed  - Refer to Case Management Department for coordinating discharge planning if the patient needs post-hospital services based on physician/advanced practitioner order or complex needs related to functional status, cognitive ability, or social support system  1/6/2024 1142 by Jaylin Bynum RN  Outcome: Adequate for  Discharge  1/6/2024 0811 by Jaylin Bynum RN  Outcome: Progressing     Problem: Knowledge Deficit  Goal: Patient/family/caregiver demonstrates understanding of disease process, treatment plan, medications, and discharge instructions  Description: Complete learning assessment and assess knowledge base.  Interventions:  - Provide teaching at level of understanding  - Provide teaching via preferred learning methods  1/6/2024 1142 by Jaylin Bynum RN  Outcome: Adequate for Discharge  1/6/2024 0811 by Jaylin Bynum RN  Outcome: Progressing

## 2024-01-06 NOTE — ASSESSMENT & PLAN NOTE
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

## 2024-01-06 NOTE — ASSESSMENT & PLAN NOTE
Lab Results   Component Value Date    HGBA1C 11.6 (H) 10/17/2023       Recent Labs     01/05/24  1147 01/05/24  1630 01/05/24 2055 01/06/24  0712   POCGLU 161* 222* 255* 189*         Blood Sugar Average: Last 72 hrs:  (P) 181.9    Resume previously prescribed medications at discharge  Carb-controlled diet

## 2024-01-06 NOTE — ASSESSMENT & PLAN NOTE
Blood pressure is adequately controlled at this time  Continue Amlodipine 5mg daily  Hold further blood pressure medications as noted on medication discharge list  HCTZ, Lisinopril, and Metoprolol

## 2024-01-06 NOTE — ASSESSMENT & PLAN NOTE
Patient presented with a 2-week history of diffuse abdominal discomfort, nausea, and vomiting  Patient is currently pain free  CT abd/pelvis (1/3/24): Pancreatic head mass measuring approximately 2.4 x 1.6 cm with atrophy of the pancreatic body and tail. 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 Multiple liver lesions consistent with metastatic disease.   Was offered celiac plexus block with IR, however, has been well-controlled on oral [place]  Outpatient referral to palliative care provided

## 2024-01-06 NOTE — PROGRESS NOTES
Patient:    MRN:  [id number]    Aidin Request ID:  [id number]    Level of care reserved:  Home Health Agency    Partner Reserved:  [facility], [place] [address] [phone]    Clinical needs requested:    Geography searched:  [phone]    Start of Service:    Request sent:  10:58am EST on 1/6/2024 by Javy Waddell    Partner reserved:  11:55am EST on 1/6/2024 by Javy Waddell    Choice list shared:

## 2024-01-06 NOTE — ASSESSMENT & PLAN NOTE
Present on arrival and has resolved at this time  Encouraged on adequate PO intake and Ensure/Boost if needed

## 2024-01-06 NOTE — DISCHARGE SUMMARY
[facility]  Discharge- Candi Carpenter 1940, 83 y.o. female MRN: [id number]  Unit/Bed#: [facility] [place] Encounter: [id number]  Primary Care Provider: Ming Paredes MD   Date and time admitted to hospital: 1/3/2024  4:41 PM    * Abdominal pain  Assessment & Plan  Patient presented with a 2-week history of diffuse abdominal discomfort, nausea, and vomiting  Patient is currently pain free  CT abd/pelvis (1/3/24): Pancreatic head mass measuring approximately 2.4 x 1.6 cm with atrophy of the pancreatic body and tail. 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 Multiple liver lesions consistent with metastatic disease.   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  Status post liver biopsy 1/5/2024 with pathology pending  CA 19-9 pending  Will need outpatient oncology follow-up in 1 to 2 weeks    Superior mesenteric vein thrombosis (HCC)  Assessment & Plan  Imaging as above  Discharge home with Eliquis 5mg twice daily    Acute kidney injury (HCC)  Assessment & Plan  Present on arrival and has resolved at this time  Encouraged on adequate PO intake and Ensure/Boost if needed    Type 2 diabetes mellitus with stage 3 chronic kidney disease, without long-term current use of insulin, unspecified whether stage 3a or 3b CKD (HCC)  Assessment & Plan  Lab Results   Component Value Date    HGBA1C 11.6 (H) 10/17/2023       Recent Labs     01/05/24  1147 01/05/24  1630 01/05/24 2055 01/06/24  0712   POCGLU 161* 222* 255* 189*         Blood Sugar Average: Last 72 hrs:  (P) 181.9    Resume previously prescribed medications at discharge  Carb-controlled  diet    Hypertension  Assessment & Plan  Blood pressure is adequately controlled at this time  Continue Amlodipine 5mg daily  Hold further blood pressure medications as noted on medication discharge list  HCTZ, Lisinopril, and Metoprolol      Medical Problems       Resolved Problems  Date Reviewed: 1/3/2024   None       Discharging Physician / Practitioner: Xiomara Mccormick DO  PCP: Ming Paredes MD  Admission Date:   Admission Orders (From admission, onward)       Ordered        01/03/24 2147  INPATIENT ADMISSION  Once                          Discharge Date: 01/06/24    Consultations During Hospital Stay:  Hematology, IR    Procedures Performed:    Liver biopsy 1/5/2024     Significant Findings / Test Results:   CT abd/pelvis (1/3/24): Pancreatic head mass measuring approximately 2.4 x 1.6 cm with atrophy of the pancreatic body and tail. 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 Multiple liver lesions consistent with metastatic disease    Incidental Findings:   None     Test Results Pending at Discharge (will require follow up):     Pathology from liver biopsy     Outpatient Tests Requested:  To be determined upon outpatient follow-up with PCP and Oncology     Complications:  None    Reason for Admission: Abdominal pain    Hospital Course:   Candi Carpenter is a 83 y.o. female patient who originally presented to the hospital on 1/3/2024 due to abdominal pain. Please see H&P as documented by Charlie Avelar for complete details regarding history of presenting illness. In brief, the patient has a PMHx of DMII, CKD3, and HTN who presented with the complaint of abdominal pain, nausea, vomiting, diarrhea, weakness, and inability to tolerate oral intake. She was actually seen in the ED on 12/26 with similar complaints and had imaging at the time concerning for pancreatic head neoplasm.  She was discharged from the ED at that time and  "recommended for outpatient MRI and further evaluation with oncology but returned due to progressively worsening symptoms.  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  She was ultimately admitted to the medical floor for pain control and further workup of the above imaging.  During hospitalization her pain was well-controlled with oral medications and she underwent liver biopsy with interventional radiology.  She was also evaluated by oncology who recommended CA 19-9 and initiation of anticoagulation given thrombus on imaging with very close outpatient follow-up.  She was ultimately discharged home with improvement in her symptoms and new prescription for oral Eliquis and advised to follow-up outpatient with her primary care physician and establish with oncology.  This is a brief discharge summary please see full medical record for more details.    Please see above list of diagnoses and related plan for additional information.     Condition at Discharge: stable    Discharge Day Visit / Exam:   Subjective: Patient is resting comfortably in bed without any acute complaints.  No significant overnight events reported by nursing.    Vitals: Blood Pressure: 121/57 (01/06/24 0715)  Pulse: 83 (01/06/24 0715)  Temperature: 98.1 °F (36.7 °C) (01/06/24 0715)  Temp Source: Temporal (01/04/24 1500)  Respirations: 18 (01/06/24 0715)  Height: 4' 11\" (149.9 cm) (01/04/24 1500)  Weight - Scale: 78.3 kg (172 lb 9.9 oz) (01/04/24 1500)  SpO2: 94 % (01/06/24 0715)    Exam:   Physical Exam  Vitals and nursing note reviewed.   Constitutional:       General: She is not in acute distress.     Appearance: She is well-developed. She is obese.   HENT:      Head: Normocephalic and atraumatic.      Mouth/Throat:      Mouth: Mucous membranes are moist.      Pharynx: Oropharynx is clear.   Eyes:     "  Extraocular Movements: Extraocular movements intact.      Conjunctiva/sclera: Conjunctivae normal.   Cardiovascular:      Rate and Rhythm: Normal rate and regular rhythm.      Pulses: Normal pulses.      Heart sounds: Normal heart sounds. No murmur heard.  Pulmonary:      Effort: Pulmonary effort is normal. No respiratory distress.      Breath sounds: Normal breath sounds.   Abdominal:      General: Bowel sounds are normal. There is no distension.      Palpations: Abdomen is soft.      Tenderness: There is no abdominal tenderness.   Musculoskeletal:         General: No swelling. Normal range of motion.      Cervical back: Normal range of motion and neck supple.   Skin:     General: Skin is warm and dry.      Capillary Refill: Capillary refill takes less than 2 seconds.   Neurological:      General: No focal deficit present.      Mental Status: She is alert and oriented to person, place, and time. Mental status is at baseline.   Psychiatric:         Mood and Affect: Mood normal.         Behavior: Behavior normal.         Judgment: Judgment normal.          Discussion with Family: Updated  (son and daughter in law) at bedside.    Discharge instructions/Information to patient and family:   See after visit summary for information provided to patient and family.      Provisions for Follow-Up Care:  See after visit summary for information related to follow-up care and any pertinent home health orders.      Mobility at time of Discharge:   Basic Mobility Inpatient Raw Score: 17  JH-HLM Goal: 5: Stand one or more mins  JH-HLM Achieved: 6: Walk 10 steps or more  HLM Goal achieved. Continue to encourage appropriate mobility.     Disposition:   Home with [facility]NA Services (Reminder: Complete face to face encounter)    Planned Readmission: None     Discharge Statement:  I spent > 35 minutes discharging the patient. This time was spent on the day of discharge. I had direct contact with the patient on the day of  discharge. Greater than 50% of the total time was spent examining patient, answering all patient questions, arranging and discussing plan of care with patient as well as directly providing post-discharge instructions.  Additional time then spent on discharge activities.    Discharge Medications:  See after visit summary for reconciled discharge medications provided to patient and/or family.      **Please Note: This note may have been constructed using a voice recognition system**

## 2024-01-06 NOTE — PLAN OF CARE
Problem: Prexisting or High Potential for Compromised Skin Integrity  Goal: Skin integrity is maintained or improved  Description: INTERVENTIONS:  - Identify patients at risk for skin breakdown  - Assess and monitor skin integrity  - Assess and monitor nutrition and hydration status  - Monitor labs   - Assess for incontinence   - Turn and reposition patient  - Assist with mobility/ambulation  - Relieve pressure over bony prominences  - Avoid friction and shearing  - Provide appropriate hygiene as needed including keeping skin clean and dry  - Evaluate need for skin moisturizer/barrier cream  - Collaborate with interdisciplinary team   - Patient/family teaching  - Consider wound care consult   Outcome: Progressing     Problem: PAIN - ADULT  Goal: Verbalizes/displays adequate comfort level or baseline comfort level  Description: Interventions:  - Encourage patient to monitor pain and request assistance  - Assess pain using appropriate pain scale  - Administer analgesics based on type and severity of pain and evaluate response  - Implement non-pharmacological measures as appropriate and evaluate response  - Consider cultural and social influences on pain and pain management  - Notify physician/advanced practitioner if interventions unsuccessful or patient reports new pain  Outcome: Progressing     Problem: INFECTION - ADULT  Goal: Absence or prevention of progression during hospitalization  Description: INTERVENTIONS:  - Assess and monitor for signs and symptoms of infection  - Monitor lab/diagnostic results  - Monitor all insertion sites, i.e. indwelling lines, tubes, and drains  - Monitor endotracheal if appropriate and nasal secretions for changes in amount and color  - [place] appropriate cooling/warming therapies per order  - Administer medications as ordered  - Instruct and encourage patient and family to use good hand hygiene technique  - Identify and instruct in appropriate isolation precautions for  identified infection/condition  Outcome: Progressing  Goal: Absence of fever/infection during neutropenic period  Description: INTERVENTIONS:  - Monitor WBC    Outcome: Progressing     Problem: SAFETY ADULT  Goal: Patient will remain free of falls  Description: INTERVENTIONS:  - Educate patient/family on patient safety including physical limitations  - Instruct patient to call for assistance with activity   - Consult OT/PT to assist with strengthening/mobility   - Keep Call bell within reach  - Keep bed low and locked with side rails adjusted as appropriate  - Keep care items and personal belongings within reach  - Initiate and maintain comfort rounds  - Make Fall Risk Sign visible to staff  - Offer Toileting every 2 Hours, in advance of need  - Initiate/Maintain bed alarm  - Obtain necessary fall risk management equipment:   - Apply yellow socks and bracelet for high fall risk patients  - Consider moving patient to room near nurses station  Outcome: Progressing  Goal: Maintain or return to baseline ADL function  Description: INTERVENTIONS:  -  Assess patient's ability to carry out ADLs; assess patient's baseline for ADL function and identify physical deficits which impact ability to perform ADLs (bathing, care of mouth/teeth, toileting, grooming, dressing, etc.)  - Assess/evaluate cause of self-care deficits   - Assess range of motion  - Assess patient's mobility; develop plan if impaired  - Assess patient's need for assistive devices and provide as appropriate  - Encourage maximum independence but intervene and supervise when necessary  - Involve family in performance of ADLs  - Assess for home care needs following discharge   - Consider OT consult to assist with ADL evaluation and planning for discharge  - Provide patient education as appropriate  Outcome: Progressing  Goal: Maintains/Returns to pre admission functional level  Description: INTERVENTIONS:  - Perform AM-PAC 6 Click Basic Mobility/ Daily Activity  assessment daily.  - Set and communicate daily mobility goal to care team and patient/family/caregiver.   - Collaborate with rehabilitation services on mobility goals if consulted  - Perform Range of Motion 3 times a day.  - Reposition patient every 2 hours.  - Dangle patient 3 times a day  - Stand patient 3 times a day  - Ambulate patient 3 times a day  - Out of bed to chair 3 times a day   - Out of bed for meals 3 times a day  - Out of bed for toileting  - Record patient progress and toleration of activity level   Outcome: Progressing     Problem: DISCHARGE PLANNING  Goal: Discharge to home or other facility with appropriate resources  Description: INTERVENTIONS:  - Identify barriers to discharge w/patient and caregiver  - Arrange for needed discharge resources and transportation as appropriate  - Identify discharge learning needs (meds, wound care, etc.)  - Arrange for interpretive services to assist at discharge as needed  - Refer to Case Management Department for coordinating discharge planning if the patient needs post-hospital services based on physician/advanced practitioner order or complex needs related to functional status, cognitive ability, or social support system  Outcome: Progressing     Problem: Knowledge Deficit  Goal: Patient/family/caregiver demonstrates understanding of disease process, treatment plan, medications, and discharge instructions  Description: Complete learning assessment and assess knowledge base.  Interventions:  - Provide teaching at level of understanding  - Provide teaching via preferred learning methods  Outcome: Progressing
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
## 2024-01-07 ENCOUNTER — HOME CARE VISIT (OUTPATIENT)
Dept: HOME HEALTH SERVICES | Facility: HOME HEALTHCARE | Age: 84
End: 2024-01-07
Payer: COMMERCIAL

## 2024-01-07 PROCEDURE — G0299 HHS/HOSPICE OF RN EA 15 MIN: HCPCS

## 2024-01-07 PROCEDURE — 400013 VN SOC

## 2024-01-07 NOTE — CASE COMMUNICATION
"[facility] has admitted your patient to Home Health service with the following disciplines:      PT and SN  This report is informational only, no response is needed  Primary focus of home health care GI assess  Patient stated goals of care increase strength   Anticipated visit pattern and next visit date 2w4 next visit planned for Thurs 1/11  See medication list - meds in home differ from AVS Meds in home, reviewed AVS with pt at [facility]  Significant clinical findings NA   Potential barriers to goal achievement comorbidities  Other pertinent information patient stil with some nausea, states Zofran works but takes a while to \"kick in\"     Thank you for allowing us to participate in the care of your patient.          "

## 2024-01-08 ENCOUNTER — TELEPHONE (OUTPATIENT)
Dept: FAMILY MEDICINE CLINIC | Facility: CLINIC | Age: 84
End: 2024-01-08

## 2024-01-08 ENCOUNTER — DOCUMENTATION (OUTPATIENT)
Age: 84
End: 2024-01-08

## 2024-01-08 ENCOUNTER — TRANSITIONAL CARE MANAGEMENT (OUTPATIENT)
Dept: FAMILY MEDICINE CLINIC | Facility: CLINIC | Age: 84
End: 2024-01-08

## 2024-01-08 ENCOUNTER — DOCUMENTATION (OUTPATIENT)
Dept: HEMATOLOGY ONCOLOGY | Facility: CLINIC | Age: 84
End: 2024-01-08

## 2024-01-08 DIAGNOSIS — K86.89 PANCREATIC MASS: Primary | ICD-10-CM

## 2024-01-08 NOTE — PROGRESS NOTES
Intake received/ Chart reviewed for services completed outside of [facility]    Pathology completed: 1/5/2024 in process    Imaging completed: 1/3/2024 CT abd/pelvis, 1/10/2024 MRI scheduled    All records needed are in patients chart. No records retrieval needed at this time.

## 2024-01-08 NOTE — UTILIZATION REVIEW
NOTIFICATION OF ADMISSION DISCHARGE   This is a Notification of Discharge from [facility]. Please be advised that this patient has been discharge from our facility. Below you will find the admission and discharge date and time including the patient’s disposition.   UTILIZATION REVIEW CONTACT:  Gloria Murillo  Utilization   Network Utilization Review Department  Phone: [phone] carefully listen to the prompts. All voicemails are confidential.  Email: [email or web address]     ADMISSION INFORMATION  PRESENTATION DATE: 1/3/2024  4:41 PM  OBERVATION ADMISSION DATE:   INPATIENT ADMISSION DATE: 1/3/24  9:47 PM   DISCHARGE DATE: 1/6/2024 11:42 AM   DISPOSITION:Home with Home Health Care    Network Utilization Review Department  ATTENTION: Please call with any questions or concerns to [phone] and carefully listen to the prompts so that you are directed to the right person. All voicemails are confidential.   For Discharge needs, contact Care Management DC Support Team at [phone] opt. [phone]  Send all requests for admission clinical reviews, approved or denied determinations and any other requests to dedicated fax number below belonging to the campus where the patient is receiving treatment. List of dedicated fax numbers for the Facilities:  FACILITY NAME UR FAX NUMBER   ADMISSION DENIALS (Administrative/Medical Necessity) [phone]   DISCHARGE SUPPORT TEAM ([facility]) [phone]   PARENT CHILD HEALTH (Maternity/NICU/Pediatrics) [phone]   [facility] [phone]   [facility] [phone]   Replaced by [facility] [phone]   [facility] [phone]   [facility] [phone]   [facility] [phone]   [facility] [phone]   [facility]  [phone]   [facility] [phone]   [facility] [phone]   [facility] [phone]

## 2024-01-08 NOTE — TELEPHONE ENCOUNTER
Called and made TCM appt. Candi is asking i8f it is safe to take Benadryl currently. When she was in the hospital her allergies were acting up. She has this all the time but the hospital had advised her to stop the Benadryl. Asking if you feel she can restart.

## 2024-01-08 NOTE — PROGRESS NOTES
Intake reviewed   1/8/2024  Diagnosis   panc mass w/multiple hepatic lesions    Referrals placed   Medical Oncology  Labs ordered   in chart  Scan(s) ordered and scheduled   CT chest 1/12/2024 MRI 1/10/2024  Consults scheduled   Dr. Braxton 1/24/2024    Appropriate for NN initial outreach/Gen Assess   yes (once bx is resulted)

## 2024-01-08 NOTE — PROGRESS NOTES
Patient presented to the ED on on 12/26 with c/o vomiting, decreased appetite and abdominal pain.      Chart rvw'd on 1/8    Referring provider-  Dr. Xiomara Mccormick      IR Liver biopsy date-  1/5/24      Impression-  Successful percutaneous core biopsy of one of the right lobe liver masses. A full pathology report will follow.       Pathology-    Final Diagnosis   A. Liver mass, needle core biopsy:  - Metastatic adenocarcinoma with extensive necrosis.     Comment: Immunohistochemistry was performed on block A3. The tumor cells are positive for CK7, patchy positive for CDX2, P16; DPC-4 shows loss of stain; p53 is mutant type (Null type). Tumor cells are negative for TTF-1, PAX8; In the setting of a pancreatic mass, the current findings are most consistent with metastatic adenocarcinoma of pancreas.      Intradepartmental consultation is in agreement.  Dr. Stephen Cuenca is notified of the diagnosis in [employer] via [employer] on 1/10/2024  at 9:00 am .            Comments:   This is an appended report. These results have been appended to a previously preliminary verified report.      Electronically signed by Shaka Nichols MD on 1/10/2024 at  9:19 AM   Note               Labs-    Component  Ref Range & Units 1/5/24 10:50 AM   CA 19-9  0 - 35 U/mL 6677 High      1/5 - CMP  1/6 - CBC        Imaging-  1/3 - CT ABDOMEN AND PELVIS WITHOUT IV CONTRAST     Pancreatic head mass measuring approximately 2.4 x 1.6 cm with atrophy of the pancreatic body and tail. IV contrast would be helpful in this setting to better assess for any vascular invasion.     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     Multiple liver lesions consistent with metastatic disease.    Scheduled appointments-    1/10/24 - MRI w/wo  1/12/24 - CT Chest  1/24/24 - Dr. Braxton

## 2024-01-09 ENCOUNTER — TELEPHONE (OUTPATIENT)
Dept: HEMATOLOGY ONCOLOGY | Facility: CLINIC | Age: 84
End: 2024-01-09

## 2024-01-09 ENCOUNTER — HOME CARE VISIT (OUTPATIENT)
Dept: HOME HEALTH SERVICES | Facility: HOME HEALTHCARE | Age: 84
End: 2024-01-09
Payer: COMMERCIAL

## 2024-01-09 VITALS — HEART RATE: 80 BPM | OXYGEN SATURATION: 96 % | DIASTOLIC BLOOD PRESSURE: 60 MMHG | SYSTOLIC BLOOD PRESSURE: 132 MMHG

## 2024-01-09 LAB
BACTERIA BLD CULT: NORMAL
BACTERIA BLD CULT: NORMAL

## 2024-01-09 PROCEDURE — G0151 HHCP-SERV OF PT,EA 15 MIN: HCPCS

## 2024-01-09 NOTE — TELEPHONE ENCOUNTER
Appointment Change  Cancel, Reschedule, Change to Virtual      Who are you speaking with? Child Cal   If it is not the patient, is the caller listed on the communication consent form? Yes   Which provider is the appointment scheduled with? Dr. Braxton   When was the original appointment scheduled?    Please list date and time 1/24/24 3:00pm   At which location is the appointment scheduled to take place? [place]   Was the appointment rescheduled?     Was the appointment changed from an in person visit to a virtual visit?    If so, please list the details of the change. 1/22/24 3:00pm   What is the reason for the appointment change? Cal requested a sooner appointment.       Was STAR transport scheduled? No   Does STAR transport need to be scheduled for the new visit (if applicable) No   Does the patient need an infusion appointment rescheduled? No   Does the patient have an upcoming infusion appointment scheduled? If so, when? No   Is the patient undergoing chemotherapy? No   For appointments cancelled with less than 24 hours:  Was the no-show policy reviewed? Yes

## 2024-01-10 ENCOUNTER — HOSPITAL ENCOUNTER (OUTPATIENT)
Dept: MRI IMAGING | Facility: HOSPITAL | Age: 84
Discharge: HOME | DRG: 391 | End: 2024-01-10
Attending: FAMILY MEDICINE
Payer: COMMERCIAL

## 2024-01-10 ENCOUNTER — TELEPHONE (OUTPATIENT)
Dept: HEMATOLOGY ONCOLOGY | Facility: CLINIC | Age: 84
End: 2024-01-10

## 2024-01-10 ENCOUNTER — PATIENT OUTREACH (OUTPATIENT)
Dept: HEMATOLOGY ONCOLOGY | Facility: CLINIC | Age: 84
End: 2024-01-10

## 2024-01-10 DIAGNOSIS — K86.89 MASS OF HEAD OF PANCREAS: ICD-10-CM

## 2024-01-10 PROCEDURE — 88342 IMHCHEM/IMCYTCHM 1ST ANTB: CPT | Performed by: PATHOLOGY

## 2024-01-10 PROCEDURE — 88307 TISSUE EXAM BY PATHOLOGIST: CPT | Performed by: PATHOLOGY

## 2024-01-10 PROCEDURE — 88313 SPECIAL STAINS GROUP 2: CPT | Performed by: PATHOLOGY

## 2024-01-10 PROCEDURE — 88341 IMHCHEM/IMCYTCHM EA ADD ANTB: CPT | Performed by: PATHOLOGY

## 2024-01-10 PROCEDURE — G1004 CDSM NDSC: HCPCS

## 2024-01-10 PROCEDURE — 74183 MRI ABD W/O CNTR FLWD CNTR: CPT

## 2024-01-10 PROCEDURE — A9585 GADOBUTROL INJECTION: HCPCS | Performed by: FAMILY MEDICINE

## 2024-01-10 RX ORDER — GADOBUTROL 604.72 MG/ML
7 INJECTION INTRAVENOUS
Status: COMPLETED | OUTPATIENT
Start: 2024-01-10 | End: 2024-01-10

## 2024-01-10 RX ADMIN — GADOBUTROL 7 ML: 604.72 INJECTION INTRAVENOUS at 13:05

## 2024-01-10 NOTE — TELEPHONE ENCOUNTER
I phoned the patient's son, Cal, and introduced myself and indicated that I was calling from [facility] Hematology/Oncology regarding his mother's upcoming appointment. I explained that I would be able to schedule [place] for her HFU appointment with Dr. Marquez on 1/15 in the [place] office at 0900 if they would be able to come to this office instead of the [place] office. Cal indicated that this appointment on 1/15 would work and that either him or his wife would be able to bring Candi. Cal and I reviewed the office address and location, and I provided the [employer] number as the office contact. He indicated that he would relay this information to his mother today and was appreciative of the call.

## 2024-01-10 NOTE — PROGRESS NOTES
Phone outreach to the patient.  I introduced myself and explained my role.  I went over her upcoming appointments for CT Chest for 1/12, patient reports she cannot do this appointment and will need to be rescheduled but refused to reschedule right now due to too much going on and will do this at another time.  I also reviewed her appointment with Dr. Marquez for Monday 1/15 for 0900 at [facility] follow up.  She is unsure if she can be at that this appointment and needs to check with her son or friend to see if either can drive her and she will get back to me if she cannot do this.  I informed her that if I do not hear from her this week I will be in touch with her Monday 1/15.  She thanked me.

## 2024-01-11 ENCOUNTER — HOME CARE VISIT (OUTPATIENT)
Dept: HOME HEALTH SERVICES | Facility: HOME HEALTHCARE | Age: 84
End: 2024-01-11
Payer: COMMERCIAL

## 2024-01-11 VITALS
RESPIRATION RATE: 16 BRPM | SYSTOLIC BLOOD PRESSURE: 138 MMHG | TEMPERATURE: 97.7 F | DIASTOLIC BLOOD PRESSURE: 78 MMHG | OXYGEN SATURATION: 97 % | HEART RATE: 102 BPM

## 2024-01-11 PROCEDURE — G0299 HHS/HOSPICE OF RN EA 15 MIN: HCPCS

## 2024-01-12 ENCOUNTER — APPOINTMENT (EMERGENCY)
Dept: CT IMAGING | Facility: HOSPITAL | Age: 84
DRG: 391 | End: 2024-01-12
Payer: COMMERCIAL

## 2024-01-12 ENCOUNTER — HOSPITAL ENCOUNTER (INPATIENT)
Facility: HOSPITAL | Age: 84
LOS: 4 days | Discharge: HOSPICE | DRG: 391 | End: 2024-01-17
Attending: EMERGENCY MEDICINE | Admitting: HOSPITALIST
Payer: COMMERCIAL

## 2024-01-12 DIAGNOSIS — C25.9 PANCREATIC ADENOCARCINOMA (HCC): ICD-10-CM

## 2024-01-12 DIAGNOSIS — K59.00 CONSTIPATION: ICD-10-CM

## 2024-01-12 DIAGNOSIS — G89.3 CANCER ASSOCIATED PAIN: ICD-10-CM

## 2024-01-12 DIAGNOSIS — K85.90 PANCREATITIS: Primary | ICD-10-CM

## 2024-01-12 DIAGNOSIS — K86.89 PANCREATIC MASS: ICD-10-CM

## 2024-01-12 DIAGNOSIS — Z71.89 GOALS OF CARE, COUNSELING/DISCUSSION: ICD-10-CM

## 2024-01-12 LAB
ALBUMIN SERPL BCP-MCNC: 3.7 G/DL (ref 3.5–5)
ALP SERPL-CCNC: 126 U/L (ref 34–104)
ALT SERPL W P-5'-P-CCNC: 12 U/L (ref 7–52)
ANION GAP SERPL CALCULATED.3IONS-SCNC: 13 MMOL/L
AST SERPL W P-5'-P-CCNC: 23 U/L (ref 13–39)
BASOPHILS # BLD AUTO: 0.03 THOUSANDS/ÂΜL (ref 0–0.1)
BASOPHILS NFR BLD AUTO: 1 % (ref 0–1)
BILIRUB SERPL-MCNC: 0.55 MG/DL (ref 0.2–1)
BUN SERPL-MCNC: 9 MG/DL (ref 5–25)
CALCIUM SERPL-MCNC: 9 MG/DL (ref 8.4–10.2)
CARDIAC TROPONIN I PNL SERPL HS: 13 NG/L
CHLORIDE SERPL-SCNC: 94 MMOL/L (ref 96–108)
CO2 SERPL-SCNC: 25 MMOL/L (ref 21–32)
CREAT SERPL-MCNC: 0.84 MG/DL (ref 0.6–1.3)
EOSINOPHIL # BLD AUTO: 0.11 THOUSAND/ÂΜL (ref 0–0.61)
EOSINOPHIL NFR BLD AUTO: 2 % (ref 0–6)
ERYTHROCYTE [DISTWIDTH] IN BLOOD BY AUTOMATED COUNT: 12.6 % (ref 11.6–15.1)
GFR SERPL CREATININE-BSD FRML MDRD: 65 ML/MIN/1.73SQ M
GLUCOSE SERPL-MCNC: 308 MG/DL (ref 65–140)
HCT VFR BLD AUTO: 38.9 % (ref 34.8–46.1)
HGB BLD-MCNC: 12.5 G/DL (ref 11.5–15.4)
IMM GRANULOCYTES # BLD AUTO: 0.02 THOUSAND/UL (ref 0–0.2)
IMM GRANULOCYTES NFR BLD AUTO: 0 % (ref 0–2)
LIPASE SERPL-CCNC: 590 U/L (ref 11–82)
LYMPHOCYTES # BLD AUTO: 1.41 THOUSANDS/ÂΜL (ref 0.6–4.47)
LYMPHOCYTES NFR BLD AUTO: 22 % (ref 14–44)
MCH RBC QN AUTO: 29.1 PG (ref 26.8–34.3)
MCHC RBC AUTO-ENTMCNC: 32.1 G/DL (ref 31.4–37.4)
MCV RBC AUTO: 91 FL (ref 82–98)
MONOCYTES # BLD AUTO: 0.64 THOUSAND/ÂΜL (ref 0.17–1.22)
MONOCYTES NFR BLD AUTO: 10 % (ref 4–12)
NEUTROPHILS # BLD AUTO: 4.15 THOUSANDS/ÂΜL (ref 1.85–7.62)
NEUTS SEG NFR BLD AUTO: 65 % (ref 43–75)
NRBC BLD AUTO-RTO: 0 /100 WBCS
PLATELET # BLD AUTO: 223 THOUSANDS/UL (ref 149–390)
PMV BLD AUTO: 10.6 FL (ref 8.9–12.7)
POTASSIUM SERPL-SCNC: 4 MMOL/L (ref 3.5–5.3)
PROT SERPL-MCNC: 6.8 G/DL (ref 6.4–8.4)
RBC # BLD AUTO: 4.29 MILLION/UL (ref 3.81–5.12)
SODIUM SERPL-SCNC: 132 MMOL/L (ref 135–147)
WBC # BLD AUTO: 6.36 THOUSAND/UL (ref 4.31–10.16)

## 2024-01-12 PROCEDURE — 36415 COLL VENOUS BLD VENIPUNCTURE: CPT | Performed by: EMERGENCY MEDICINE

## 2024-01-12 PROCEDURE — 85025 COMPLETE CBC W/AUTO DIFF WBC: CPT | Performed by: EMERGENCY MEDICINE

## 2024-01-12 PROCEDURE — 71250 CT THORAX DX C-: CPT

## 2024-01-12 PROCEDURE — 99285 EMERGENCY DEPT VISIT HI MDM: CPT

## 2024-01-12 PROCEDURE — 83690 ASSAY OF LIPASE: CPT | Performed by: EMERGENCY MEDICINE

## 2024-01-12 PROCEDURE — 84484 ASSAY OF TROPONIN QUANT: CPT | Performed by: EMERGENCY MEDICINE

## 2024-01-12 PROCEDURE — 96374 THER/PROPH/DIAG INJ IV PUSH: CPT

## 2024-01-12 PROCEDURE — 99285 EMERGENCY DEPT VISIT HI MDM: CPT | Performed by: EMERGENCY MEDICINE

## 2024-01-12 PROCEDURE — G1004 CDSM NDSC: HCPCS

## 2024-01-12 PROCEDURE — 93005 ELECTROCARDIOGRAM TRACING: CPT

## 2024-01-12 PROCEDURE — 80053 COMPREHEN METABOLIC PANEL: CPT | Performed by: EMERGENCY MEDICINE

## 2024-01-12 PROCEDURE — 74176 CT ABD & PELVIS W/O CONTRAST: CPT

## 2024-01-12 RX ORDER — HYDROMORPHONE HCL IN WATER/PF 6 MG/30 ML
0.2 PATIENT CONTROLLED ANALGESIA SYRINGE INTRAVENOUS ONCE
Status: COMPLETED | OUTPATIENT
Start: 2024-01-12 | End: 2024-01-12

## 2024-01-12 RX ADMIN — HYDROMORPHONE HYDROCHLORIDE 0.2 MG: 0.2 INJECTION, SOLUTION INTRAMUSCULAR; INTRAVENOUS; SUBCUTANEOUS at 23:08

## 2024-01-13 ENCOUNTER — HOME CARE VISIT (OUTPATIENT)
Dept: HOME HEALTH SERVICES | Facility: HOME HEALTHCARE | Age: 84
End: 2024-01-13
Payer: MEDICARE

## 2024-01-13 ENCOUNTER — HOSPICE ADMISSION (OUTPATIENT)
Dept: HOME HOSPICE | Facility: HOSPICE | Age: 84
End: 2024-01-13
Payer: MEDICARE

## 2024-01-13 PROBLEM — C25.9 PANCREATIC ADENOCARCINOMA (HCC): Status: ACTIVE | Noted: 2024-01-01

## 2024-01-13 PROBLEM — R74.8 ELEVATED LIPASE: Chronic | Status: ACTIVE | Noted: 2024-01-01

## 2024-01-13 PROBLEM — N17.9 ACUTE KIDNEY INJURY (HCC): Status: RESOLVED | Noted: 2024-01-04 | Resolved: 2024-01-13

## 2024-01-13 PROBLEM — E87.20 LACTIC ACIDOSIS: Status: RESOLVED | Noted: 2024-01-04 | Resolved: 2024-01-13

## 2024-01-13 PROBLEM — K55.069 SUPERIOR MESENTERIC VEIN THROMBOSIS (HCC): Chronic | Status: ACTIVE | Noted: 2024-01-05

## 2024-01-13 LAB
2HR DELTA HS TROPONIN: 2 NG/L
4HR DELTA HS TROPONIN: 1 NG/L
ANION GAP SERPL CALCULATED.3IONS-SCNC: 7 MMOL/L
ATRIAL RATE: 79 BPM
BUN SERPL-MCNC: 8 MG/DL (ref 5–25)
CALCIUM SERPL-MCNC: 8.3 MG/DL (ref 8.4–10.2)
CARDIAC TROPONIN I PNL SERPL HS: 14 NG/L
CARDIAC TROPONIN I PNL SERPL HS: 15 NG/L
CHLORIDE SERPL-SCNC: 99 MMOL/L (ref 96–108)
CO2 SERPL-SCNC: 27 MMOL/L (ref 21–32)
CREAT SERPL-MCNC: 0.71 MG/DL (ref 0.6–1.3)
ERYTHROCYTE [DISTWIDTH] IN BLOOD BY AUTOMATED COUNT: 12.7 % (ref 11.6–15.1)
GFR SERPL CREATININE-BSD FRML MDRD: 80 ML/MIN/1.73SQ M
GLUCOSE SERPL-MCNC: 158 MG/DL (ref 65–140)
GLUCOSE SERPL-MCNC: 183 MG/DL (ref 65–140)
GLUCOSE SERPL-MCNC: 200 MG/DL (ref 65–140)
GLUCOSE SERPL-MCNC: 225 MG/DL (ref 65–140)
GLUCOSE SERPL-MCNC: 268 MG/DL (ref 65–140)
GLUCOSE SERPL-MCNC: 269 MG/DL (ref 65–140)
HCT VFR BLD AUTO: 33.8 % (ref 34.8–46.1)
HGB BLD-MCNC: 10.8 G/DL (ref 11.5–15.4)
MCH RBC QN AUTO: 29.4 PG (ref 26.8–34.3)
MCHC RBC AUTO-ENTMCNC: 32 G/DL (ref 31.4–37.4)
MCV RBC AUTO: 92 FL (ref 82–98)
P AXIS: 62 DEGREES
PLATELET # BLD AUTO: 175 THOUSANDS/UL (ref 149–390)
PMV BLD AUTO: 11.6 FL (ref 8.9–12.7)
POTASSIUM SERPL-SCNC: 4.3 MMOL/L (ref 3.5–5.3)
PR INTERVAL: 142 MS
QRS AXIS: -17 DEGREES
QRSD INTERVAL: 94 MS
QT INTERVAL: 374 MS
QTC INTERVAL: 428 MS
RBC # BLD AUTO: 3.67 MILLION/UL (ref 3.81–5.12)
SODIUM SERPL-SCNC: 133 MMOL/L (ref 135–147)
T WAVE AXIS: 52 DEGREES
VENTRICULAR RATE: 79 BPM
WBC # BLD AUTO: 6.26 THOUSAND/UL (ref 4.31–10.16)

## 2024-01-13 PROCEDURE — 96375 TX/PRO/DX INJ NEW DRUG ADDON: CPT

## 2024-01-13 PROCEDURE — 99497 ADVNCD CARE PLAN 30 MIN: CPT | Performed by: HOSPITALIST

## 2024-01-13 PROCEDURE — 82948 REAGENT STRIP/BLOOD GLUCOSE: CPT

## 2024-01-13 PROCEDURE — 99222 1ST HOSP IP/OBS MODERATE 55: CPT | Performed by: HOSPITALIST

## 2024-01-13 PROCEDURE — 80048 BASIC METABOLIC PNL TOTAL CA: CPT | Performed by: PHYSICIAN ASSISTANT

## 2024-01-13 PROCEDURE — 84484 ASSAY OF TROPONIN QUANT: CPT | Performed by: EMERGENCY MEDICINE

## 2024-01-13 PROCEDURE — 99222 1ST HOSP IP/OBS MODERATE 55: CPT | Performed by: INTERNAL MEDICINE

## 2024-01-13 PROCEDURE — 85027 COMPLETE CBC AUTOMATED: CPT | Performed by: PHYSICIAN ASSISTANT

## 2024-01-13 RX ORDER — ONDANSETRON 2 MG/ML
4 INJECTION INTRAMUSCULAR; INTRAVENOUS EVERY 6 HOURS PRN
Status: DISCONTINUED | OUTPATIENT
Start: 2024-01-13 | End: 2024-01-17 | Stop reason: HOSPADM

## 2024-01-13 RX ORDER — POLYETHYLENE GLYCOL 3350 17 G/17G
17 POWDER, FOR SOLUTION ORAL 2 TIMES DAILY
Status: DISCONTINUED | OUTPATIENT
Start: 2024-01-13 | End: 2024-01-17 | Stop reason: HOSPADM

## 2024-01-13 RX ORDER — INSULIN LISPRO 100 [IU]/ML
1-6 INJECTION, SOLUTION INTRAVENOUS; SUBCUTANEOUS
Status: DISCONTINUED | OUTPATIENT
Start: 2024-01-13 | End: 2024-01-14

## 2024-01-13 RX ORDER — OXYCODONE HYDROCHLORIDE 10 MG/1
10 TABLET ORAL EVERY 6 HOURS PRN
Status: DISCONTINUED | OUTPATIENT
Start: 2024-01-13 | End: 2024-01-17 | Stop reason: HOSPADM

## 2024-01-13 RX ORDER — INSULIN LISPRO 100 [IU]/ML
1-6 INJECTION, SOLUTION INTRAVENOUS; SUBCUTANEOUS EVERY 6 HOURS SCHEDULED
Status: DISCONTINUED | OUTPATIENT
Start: 2024-01-13 | End: 2024-01-13

## 2024-01-13 RX ORDER — HYDROMORPHONE HCL/PF 1 MG/ML
0.5 SYRINGE (ML) INJECTION EVERY 4 HOURS PRN
Status: DISCONTINUED | OUTPATIENT
Start: 2024-01-13 | End: 2024-01-17 | Stop reason: HOSPADM

## 2024-01-13 RX ORDER — BISACODYL 10 MG
10 SUPPOSITORY, RECTAL RECTAL DAILY PRN
Status: DISCONTINUED | OUTPATIENT
Start: 2024-01-13 | End: 2024-01-17 | Stop reason: HOSPADM

## 2024-01-13 RX ORDER — ENOXAPARIN SODIUM 100 MG/ML
40 INJECTION SUBCUTANEOUS DAILY
Status: DISCONTINUED | OUTPATIENT
Start: 2024-01-13 | End: 2024-01-13

## 2024-01-13 RX ORDER — AMLODIPINE BESYLATE 5 MG/1
5 TABLET ORAL DAILY
Status: DISCONTINUED | OUTPATIENT
Start: 2024-01-13 | End: 2024-01-17 | Stop reason: HOSPADM

## 2024-01-13 RX ORDER — POLYETHYLENE GLYCOL 3350 17 G/17G
17 POWDER, FOR SOLUTION ORAL DAILY
Status: DISCONTINUED | OUTPATIENT
Start: 2024-01-13 | End: 2024-01-13

## 2024-01-13 RX ORDER — HYDROMORPHONE HCL/PF 1 MG/ML
0.5 SYRINGE (ML) INJECTION ONCE
Status: COMPLETED | OUTPATIENT
Start: 2024-01-13 | End: 2024-01-13

## 2024-01-13 RX ORDER — ASCORBIC ACID 500 MG
500 TABLET ORAL DAILY
Status: DISCONTINUED | OUTPATIENT
Start: 2024-01-13 | End: 2024-01-17 | Stop reason: HOSPADM

## 2024-01-13 RX ORDER — DOCUSATE SODIUM 100 MG/1
100 CAPSULE, LIQUID FILLED ORAL 2 TIMES DAILY
Status: DISCONTINUED | OUTPATIENT
Start: 2024-01-13 | End: 2024-01-17 | Stop reason: HOSPADM

## 2024-01-13 RX ORDER — SODIUM CHLORIDE 9 MG/ML
100 INJECTION, SOLUTION INTRAVENOUS CONTINUOUS
Status: DISPENSED | OUTPATIENT
Start: 2024-01-13 | End: 2024-01-13

## 2024-01-13 RX ADMIN — SODIUM CHLORIDE 1000 ML: 0.9 INJECTION, SOLUTION INTRAVENOUS at 00:09

## 2024-01-13 RX ADMIN — SODIUM CHLORIDE 100 ML/HR: 0.9 INJECTION, SOLUTION INTRAVENOUS at 12:09

## 2024-01-13 RX ADMIN — Medication 2.5 MG: at 04:25

## 2024-01-13 RX ADMIN — DOCUSATE SODIUM 100 MG: 100 CAPSULE, LIQUID FILLED ORAL at 09:10

## 2024-01-13 RX ADMIN — INSULIN LISPRO 1 UNITS: 100 INJECTION, SOLUTION INTRAVENOUS; SUBCUTANEOUS at 12:00

## 2024-01-13 RX ADMIN — OXYCODONE HYDROCHLORIDE AND ACETAMINOPHEN 500 MG: 500 TABLET ORAL at 09:10

## 2024-01-13 RX ADMIN — SODIUM CHLORIDE 100 ML/HR: 0.9 INJECTION, SOLUTION INTRAVENOUS at 02:54

## 2024-01-13 RX ADMIN — INSULIN LISPRO 2 UNITS: 100 INJECTION, SOLUTION INTRAVENOUS; SUBCUTANEOUS at 05:57

## 2024-01-13 RX ADMIN — APIXABAN 5 MG: 5 TABLET, FILM COATED ORAL at 17:50

## 2024-01-13 RX ADMIN — APIXABAN 5 MG: 5 TABLET, FILM COATED ORAL at 11:54

## 2024-01-13 RX ADMIN — POLYETHYLENE GLYCOL 3350 17 G: 17 POWDER, FOR SOLUTION ORAL at 20:41

## 2024-01-13 RX ADMIN — SODIUM CHLORIDE 100 ML/HR: 0.9 INJECTION, SOLUTION INTRAVENOUS at 21:15

## 2024-01-13 RX ADMIN — DOCUSATE SODIUM 100 MG: 100 CAPSULE, LIQUID FILLED ORAL at 17:50

## 2024-01-13 RX ADMIN — POLYETHYLENE GLYCOL 3350 17 G: 17 POWDER, FOR SOLUTION ORAL at 09:10

## 2024-01-13 RX ADMIN — APIXABAN 5 MG: 5 TABLET, FILM COATED ORAL at 01:48

## 2024-01-13 RX ADMIN — HYDROMORPHONE HYDROCHLORIDE 0.5 MG: 1 INJECTION, SOLUTION INTRAMUSCULAR; INTRAVENOUS; SUBCUTANEOUS at 06:01

## 2024-01-13 RX ADMIN — HYDROMORPHONE HYDROCHLORIDE 0.5 MG: 1 INJECTION, SOLUTION INTRAMUSCULAR; INTRAVENOUS; SUBCUTANEOUS at 00:09

## 2024-01-13 RX ADMIN — INSULIN LISPRO 2 UNITS: 100 INJECTION, SOLUTION INTRAVENOUS; SUBCUTANEOUS at 16:58

## 2024-01-13 NOTE — CONSULTS
Consultation -  [facility]  Candi Santillanbeatrice 83 y.o. 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    Suspect abdominal pain is multifactorial secondary to known pancreatic mass with hepatic metastasis, possible component of pancreatitis, and opioid induced constipation.  Recommend continued supportive care with the following:  -Maintenance IV fluids until able to tolerate hydration orally  -Clear liquid diet now and advance as tolerated  -Pain control as needed, wean to least effective dose orally when able  -Antiemetics as needed  -Bowel regimen changed to MiraLAX twice daily +  Bisacodyl suppositories can be used if she is having difficulty tolerating oral medications and still not having bowel movements    Regarding long-term management of recent diagnosis of pancreatic malignancy, if she is discharged prior to Monday, I have advised to keep her appointment with oncology otherwise this would need to be rescheduled.  Agree with outpatient palliative care assessment as well.      ______________________________________________________________________    HPI: Patient is an 83-year-old female with recent admissions for abdominal pain with imaging findings concerning for pancreatic cancer with metastatic lesions in the liver, SMV thrombosis, biopsy-proven adenocarcinoma from liver biopsy, who presents again yesterday due to worsening abdominal pain.    She has had 2 admissions within the past month for abdominal pain.  She was most  recently discharged 1/6/2024.  At that time she had been offered celiac plexus block by IR but pain has been well-controlled with oral medications.  Outpatient referral for palliative and oncology was provided.  She reports having a scheduled appointment with oncology on Monday, 1/15/2024.    Her pain is very similar to when she presented on prior admissions but seem to be worse and uncontrolled with oral medications.  She did not have obvious nausea but try to take some stool softeners and threw them up.  She reports not having had a bowel movement in now over 3 days.  She is passing gas.  Workup on arrival revealing elevated lipase to 550.  Labs were overall otherwise unremarkable aside from mild alkaline phosphatase elevation, hyperglycemia.  Total bilirubin remains within normal range at 0.55.  Prior CA 19-9 collected 1/5/2024 was 6677.    REVIEW OF SYSTEMS:    CONSTITUTIONAL: Denies any fever, chills, rigors, and weight loss.  HEENT: No earache or tinnitus. Denies hearing loss or visual disturbances.  CARDIOVASCULAR: No chest pain or palpitations.   RESPIRATORY: Denies any cough, hemoptysis, shortness of breath or dyspnea on exertion.  GASTROINTESTINAL: As noted in the History of Present Illness.   GENITOURINARY: No problems with urination. Denies any hematuria or dysuria.  NEUROLOGIC: No dizziness or vertigo, denies headaches.   MUSCULOSKELETAL: Denies any muscle or joint pain.   SKIN: Denies skin rashes or itching.   ENDOCRINE: Denies excessive thirst. Denies intolerance to heat or cold.  PSYCHOSOCIAL: Denies depression or anxiety. Denies any recent memory loss.       Historical Information   Past Medical History:   Diagnosis Date    Bereavement, uncomplicated     [id number]  RESOLVED    Coronary artery disease     Diabetes mellitus (HCC)     Hyperlipidemia     Hypertension     Lymphedema     Morbid obesity (HCC)     Myocardial infarction (HCC)      Past Surgical History:   Procedure Laterality Date    CARDIAC  CATHETERIZATION      CARPAL TUNNEL RELEASE Bilateral     HIP SURGERY      IR BIOPSY LIVER MASS  01/05/2024    TUBAL LIGATION       Social History   Social History     Substance and Sexual Activity   Alcohol Use Never     Social History     Substance and Sexual Activity   Drug Use Never     Social History     Tobacco Use   Smoking Status Never   Smokeless Tobacco Never     Family History   Problem Relation Age of Onset    Heart disease Mother     Diabetes type II Mother     Hypertension Mother     Heart disease Sister     Diabetes type II Sister     Diabetes type II Brother     Diabetes type II Family        Meds/Allergies     Medications Prior to Admission   Medication    amLODIPine (NORVASC) 5 mg tablet    apixaban (Eliquis) 5 mg    Ascorbic Acid (VITAMIN C) 500 MG CAPS    diphenhydrAMINE (BENADRYL) 2 % cream    HYDROcodone-acetaminophen (NORCO) 5-325 mg per tablet    ondansetron (ZOFRAN-ODT) 4 mg disintegrating tablet     Current Facility-Administered Medications   Medication Dose Route Frequency    amLODIPine (NORVASC) tablet 5 mg  5 mg Oral Daily    apixaban (ELIQUIS) tablet 5 mg  5 mg Oral BID    ascorbic acid (VITAMIN C) tablet 500 mg  500 mg Oral Daily    docusate sodium (COLACE) capsule 100 mg  100 mg Oral BID    HYDROmorphone (DILAUDID) injection 0.5 mg  0.5 mg Intravenous Q4H PRN    insulin lispro (HumaLOG) 100 units/mL subcutaneous injection 1-6 Units  1-6 Units Subcutaneous Q6H MIKE    ondansetron (ZOFRAN) injection 4 mg  4 mg Intravenous Q6H PRN    oxyCODONE (ROXICODONE) immediate release tablet 10 mg  10 mg Oral Q6H PRN    oxyCODONE (ROXICODONE) split tablet 2.5 mg  2.5 mg Oral Q6H PRN    polyethylene glycol (MIRALAX) packet 17 g  17 g Oral Daily    sodium chloride 0.9 % infusion  100 mL/hr Intravenous Continuous       Allergies   Allergen Reactions    Jardiance [Empagliflozin]     Prandin [Repaglinide] Edema    Sulfa Antibiotics Other (See Comments)    Zinc Acetate Rash           Objective     Blood  "pressure 108/57, pulse 77, temperature 97.7 °F (36.5 °C), resp. rate 19, height 4' 11\" (1.499 m), weight 85.2 kg (187 lb 13.3 oz), SpO2 93%, not currently breastfeeding. Body mass index is 37.94 kg/m².      Intake/Output Summary (Last 24 hours) at 1/13/2024 0824  Last data filed at 1/13/2024 0711  Gross per 24 hour   Intake --   Output 150 ml   Net -150 ml         PHYSICAL EXAM:      General Appearance:   Alert, cooperative, no distress   HEENT:   Normocephalic, atraumatic, anicteric.     Neck:  Supple, symmetrical, trachea midline   Lungs:   Clear to auscultation bilaterally; no rales, rhonchi or wheezing; respirations unlabored    Heart::   Regular rate and rhythm; no murmur, rub, or gallop.   Abdomen:   Soft, mild tenderness in the upper quadrants bilaterally, non-distended; normal bowel sounds; no masses, no organomegaly    Genitalia:   Deferred    Rectal:   Deferred    Extremities:  No cyanosis, clubbing or edema    Pulses:  2+ and symmetric all extremities    Skin:  No jaundice, rashes, or lesions    Lymph nodes:  No palpable cervical lymphadenopathy        Lab Results:   Admission on 01/12/2024   Component Date Value    WBC 01/12/2024 6.36     RBC 01/12/2024 4.29     Hemoglobin 01/12/2024 12.5     Hematocrit 01/12/2024 38.9     MCV 01/12/2024 91     MCH 01/12/2024 29.1     MCHC 01/12/2024 32.1     RDW 01/12/2024 12.6     MPV 01/12/2024 10.6     Platelets 01/12/2024 223     nRBC 01/12/2024 0     Neutrophils Relative 01/12/2024 65     Immat GRANS % 01/12/2024 0     Lymphocytes Relative 01/12/2024 22     Monocytes Relative 01/12/2024 10     Eosinophils Relative 01/12/2024 2     Basophils Relative 01/12/2024 1     Neutrophils Absolute 01/12/2024 4.15     Immature Grans Absolute 01/12/2024 0.02     Lymphocytes Absolute 01/12/2024 1.41     Monocytes Absolute 01/12/2024 0.64     Eosinophils Absolute 01/12/2024 0.11     Basophils Absolute 01/12/2024 0.03     Sodium 01/12/2024 132 (L)     Potassium 01/12/2024 4.0     " Chloride 01/12/2024 94 (L)     CO2 01/12/2024 25     ANION GAP 01/12/2024 13     BUN 01/12/2024 9     Creatinine 01/12/2024 0.84     Glucose 01/12/2024 308 (H)     Calcium 01/12/2024 9.0     AST 01/12/2024 23     ALT 01/12/2024 12     Alkaline Phosphatase 01/12/2024 126 (H)     Total Protein 01/12/2024 6.8     Albumin 01/12/2024 3.7     Total Bilirubin 01/12/2024 0.55     eGFR 01/12/2024 65     Lipase 01/12/2024 590 (H)     hs TnI 0hr 01/12/2024 13     Ventricular Rate 01/12/2024 79     Atrial Rate 01/12/2024 79     NH Interval 01/12/2024 142     QRSD Interval 01/12/2024 94     QT Interval 01/12/2024 374     QTC Interval 01/12/2024 428     P Axis 01/12/2024 62     QRS Axis 01/12/2024 -17     T Wave Axis 01/12/2024 52     hs TnI 2hr 01/13/2024 15     Delta 2hr hsTnI 01/13/2024 2     hs TnI 4hr 01/13/2024 14     Delta 4hr hsTnI 01/13/2024 1     POC Glucose 01/13/2024 269 (H)     Sodium 01/13/2024 133 (L)     Potassium 01/13/2024 4.3     Chloride 01/13/2024 99     CO2 01/13/2024 27     ANION GAP 01/13/2024 7     BUN 01/13/2024 8     Creatinine 01/13/2024 0.71     Glucose 01/13/2024 268 (H)     Calcium 01/13/2024 8.3 (L)     eGFR 01/13/2024 80     WBC 01/13/2024 6.26     RBC 01/13/2024 3.67 (L)     Hemoglobin 01/13/2024 10.8 (L)     Hematocrit 01/13/2024 33.8 (L)     MCV 01/13/2024 92     MCH 01/13/2024 29.4     MCHC 01/13/2024 32.0     RDW 01/13/2024 12.7     Platelets 01/13/2024 175     MPV 01/13/2024 11.6     POC Glucose 01/13/2024 225 (H)        Imaging Studies: I have personally reviewed pertinent imaging studies.

## 2024-01-13 NOTE — ASSESSMENT & PLAN NOTE
Patient presented to the ED secondary to abdominal pain and constipation.  Pain control  Colace, Miralax for constipation  Tissue biopsy liver mass 1/5/24: Metastatic adenocarcinoma with extensive necrosis.   CT:  Stable spiculated mass in the pancreatic head/uncinate process region with minimal surrounding hazy stranding is again seen as described above. This could be related to tumor infiltration or sequela of mild pancreatitis, recommend clinical correlation with pancreatic enzyme levels. Multiple ill-defined hypodense liver lesions again seen consistent with metastatic disease.  Subtle wall thickening of the duodenum may be due to underdistention or represent secondary duodenitis from adjacent pancreatic process. Remaining small and large bowel loops appear normal in course and caliber without obstruction or inflammation. Normal terminal ileum, appendix, and large bowel. Small amount of perihepatic/perisplenic and pelvic ascites noted, new since the prior study.

## 2024-01-13 NOTE — ASSESSMENT & PLAN NOTE
Lab Results   Component Value Date    HGBA1C 11.6 (H) 10/17/2023       Recent Labs     01/13/24  0136   POCGLU 269*       Blood Sugar Average: Last 72 hrs:  (P) 269  Add Lantus at night  Continue Accu-Cheks before meals and at bedtime with sliding scale coverage otherwise  Target blood sugar for the hospital is 140-190

## 2024-01-13 NOTE — ASSESSMENT & PLAN NOTE
Suspect secondary to pancreatic mass with liver mets  Lipase elevated to 590, previously was 322 on January 4  N.p.o.  GI input

## 2024-01-13 NOTE — H&P
[facility]  H&P  Name: Candi Carpenter 83 y.o. female I MRN: [id number]  Unit/Bed#: -01 I Date of Admission: 1/12/2024   Date of Service: 1/13/2024 I Hospital Day: 0      Assessment/Plan   * Pancreatic adenocarcinoma with liver mets (HCC)  Assessment & Plan  Patient presented to the ED secondary to abdominal pain and constipation.  Pain control  Colace, Miralax for constipation  Tissue biopsy liver mass 1/5/24: Metastatic adenocarcinoma with extensive necrosis.   CT:  Stable spiculated mass in the pancreatic head/uncinate process region with minimal surrounding hazy stranding is again seen as described above. This could be related to tumor infiltration or sequela of mild pancreatitis, recommend clinical correlation with pancreatic enzyme levels. Multiple ill-defined hypodense liver lesions again seen consistent with metastatic disease.  Subtle wall thickening of the duodenum may be due to underdistention or represent secondary duodenitis from adjacent pancreatic process. Remaining small and large bowel loops appear normal in course and caliber without obstruction or inflammation. Normal terminal ileum, appendix, and large bowel. Small amount of perihepatic/perisplenic and pelvic ascites noted, new since the prior study.      Superior mesenteric vein thrombosis (HCC)  Assessment & Plan  Continue Eliquis 5mg BID    Hyponatremia  Assessment & Plan  Mild, IVF and monitor    Elevated lipase  Assessment & Plan  Suspect secondary to pancreatic mass with liver mets  Lipase elevated to 590, previously was 322 on January 4  N.p.o.  GI input    Coronary artery disease involving native coronary artery of native heart without angina pectoris  Assessment & Plan  W/ hx of stent  No chest pain.    Type 2 diabetes mellitus with stage 3 chronic kidney disease, without long-term current use of insulin, unspecified whether stage 3a or 3b CKD (HCC)  Assessment & Plan  Lab Results   Component Value Date     "HGBA1C 11.6 (H) 10/17/2023       No results for input(s): \"POCGLU\" in the last 72 hours.    Blood Sugar Average: Last 72 hrs:  Every 6 hour Accu-Chek with insulin coverage  Hold home medications and resume at discharge         VTE Pharmacologic Prophylaxis: VTE Score: 6 High Risk (Score >/= 5) - Pharmacological DVT Prophylaxis Ordered: apixaban (Eliquis). Sequential Compression Devices Ordered.  Code Status: Level 3  Discussion with patient    Anticipated Length of Stay: Patient will be admitted on an observation basis with an anticipated length of stay of less than 2 midnights secondary to pain control, specialist input.    Total Time Spent on Date of Encounter in care of patient: 65 mins. This time was spent on one or more of the following: performing physical exam; counseling and coordination of care; obtaining or reviewing history; documenting in the medical record; reviewing/ordering tests, medications or procedures; communicating with other healthcare professionals and discussing with patient's family/caregivers.    Chief Complaint: abdominal pain    History of Present Illness:  Candi Carpenter is a 83 y.o. female with a PMH of HTN, HLD, obesity, superior mesenteric vein thrombosis on Eliquis, pancreatic mass with liver mets who presents with abdominal pain and constipation. Patient reports pain epigastric area with vomiting lots of phlegm, notes constipation with no BM x 3 days. Has not tolerated po intake. Feels weak, notes weight loss, reports 202 down to 173 since before [place]. Patient reports taking the Norco at home with no relief. Abdomen feels bloated. She was scheduled to see Oncology on Monday.     Review of Systems:  Review of Systems   Constitutional:  Positive for appetite change and chills. Negative for fever.   HENT:  Positive for rhinorrhea. Negative for sore throat and trouble swallowing.    Eyes:  Negative for discharge and redness.   Respiratory:  Negative for cough and shortness of " breath.    Cardiovascular:  Negative for chest pain, palpitations and leg swelling.   Gastrointestinal:  Positive for abdominal pain, constipation, nausea and vomiting. Negative for diarrhea.   Genitourinary:  Negative for dysuria and hematuria.   Musculoskeletal:  Positive for arthralgias. Negative for back pain, myalgias and neck pain.   Skin:  Negative for rash and wound.   Neurological:  Positive for weakness. Negative for dizziness and headaches.   Psychiatric/Behavioral:  Negative for agitation and confusion.        Past Medical and Surgical History:   Past Medical History:   Diagnosis Date    Bereavement, uncomplicated     [id number]  RESOLVED    Coronary artery disease     Diabetes mellitus (HCC)     Hyperlipidemia     Hypertension     Lymphedema     Morbid obesity (HCC)     Myocardial infarction (HCC)        Past Surgical History:   Procedure Laterality Date    CARDIAC CATHETERIZATION      CARPAL TUNNEL RELEASE Bilateral     HIP SURGERY      IR BIOPSY LIVER MASS  01/05/2024    TUBAL LIGATION         Meds/Allergies:  Prior to Admission medications    Medication Sig Start Date End Date Taking? Authorizing Provider   amLODIPine (NORVASC) 5 mg tablet Take 1 tablet (5 mg total) by mouth daily 5/4/23  Yes Pee Anaya MD   apixaban (Eliquis) 5 mg Take 1 tablet (5 mg total) by mouth 2 (two) times a day 1/5/24 2/4/24 Yes Xiomara Mccormick DO   Ascorbic Acid (VITAMIN C) 500 MG CAPS Take 1 tablet by mouth daily   Yes Historical Provider, MD   diphenhydrAMINE (BENADRYL) 2 % cream Apply topically 3 (three) times a day as needed for itching 5/12/23  Yes Ming Paredes MD   HYDROcodone-acetaminophen (NORCO) 5-325 mg per tablet Take 1 tablet by mouth every 6 (six) hours as needed for pain for up to 7 days Max Daily Amount: 4 tablets 1/6/24 1/13/24 Yes Xiomara Mccormick DO   ondansetron (ZOFRAN-ODT) 4 mg disintegrating tablet Take 1 tablet (4 mg total) by mouth every 6 (six) hours as needed for  "nausea or vomiting for up to 5 days 1/6/24 1/11/24  Xiomara Mccormick,    glimepiride (AMARYL) 4 mg tablet Take 1 tablet (4 mg total) by mouth daily with breakfast  Patient not taking: Reported on 1/13/2024 8/17/23 1/13/24  Ming Paredes MD   linaGLIPtin 5 MG TABS Take 5 mg by mouth daily with or without food  Patient not taking: Reported on 1/13/2024 8/3/23 1/13/24  Ming Paredes MD   metFORMIN (GLUCOPHAGE) 1000 MG tablet Take 1 tablet (1,000 mg total) by mouth 2 (two) times a day with meals  Patient not taking: Reported on 1/13/2024 8/3/23 1/13/24  Ming Paredes MD     I have reviewed home medications with patient personally.    Allergies:   Allergies   Allergen Reactions    Jardiance [Empagliflozin]     Prandin [Repaglinide] Edema    Sulfa Antibiotics Other (See Comments)    Zinc Acetate Rash       Social History:  Marital Status:    Occupation: retired  Patient Pre-hospital Living Situation: With other family member: significant other  Patient Pre-hospital Level of Mobility: walks with cane  Patient Pre-hospital Diet Restrictions: none  Substance Use History:   Social History     Substance and Sexual Activity   Alcohol Use Never     Social History     Tobacco Use   Smoking Status Never   Smokeless Tobacco Never     Social History     Substance and Sexual Activity   Drug Use Never       Family History:  Family History   Problem Relation Age of Onset    Heart disease Mother     Diabetes type II Mother     Hypertension Mother     Heart disease Sister     Diabetes type II Sister     Diabetes type II Brother     Diabetes type II Family        Physical Exam:     Vitals:   Blood Pressure: 139/77 (01/13/24 0116)  Pulse: 80 (01/13/24 0116)  Respirations: 20 (01/13/24 0116)  Height: 4' 11\" (149.9 cm) (01/12/24 2215)  Weight - Scale: 78.5 kg (173 lb) (01/12/24 2215)  SpO2: 97 % (01/13/24 0116)    Physical Exam  Vitals reviewed.   Constitutional:       Appearance: Normal " appearance.      Comments: Elderly  female   HENT:      Head: Normocephalic and atraumatic.      Nose: Nose normal.   Eyes:      General:         Right eye: No discharge.         Left eye: No discharge.      Extraocular Movements: Extraocular movements intact.      Conjunctiva/sclera: Conjunctivae normal.   Cardiovascular:      Rate and Rhythm: Normal rate and regular rhythm.   Pulmonary:      Effort: Pulmonary effort is normal. No respiratory distress.      Breath sounds: Normal breath sounds. No wheezing.   Abdominal:      General: Bowel sounds are normal. There is no distension.      Palpations: Abdomen is soft.      Tenderness: There is abdominal tenderness. There is no guarding.   Musculoskeletal:         General: No swelling or tenderness. Normal range of motion.      Cervical back: Normal range of motion.      Right lower leg: No edema.      Left lower leg: No edema.   Skin:     General: Skin is warm and dry.      Capillary Refill: Capillary refill takes less than 2 seconds.      Coloration: Skin is pale.   Neurological:      General: No focal deficit present.      Mental Status: She is alert. Mental status is at baseline.      Motor: Weakness present.   Psychiatric:         Mood and Affect: Mood normal.         Behavior: Behavior normal.            Additional Data:     Lab Results:  Results from last 7 days   Lab Units 01/12/24  2307   WBC Thousand/uL 6.36   HEMOGLOBIN g/dL 12.5   HEMATOCRIT % 38.9   PLATELETS Thousands/uL 223   NEUTROS PCT % 65   LYMPHS PCT % 22   MONOS PCT % 10   EOS PCT % 2     Results from last 7 days   Lab Units 01/12/24  2307   SODIUM mmol/L 132*   POTASSIUM mmol/L 4.0   CHLORIDE mmol/L 94*   CO2 mmol/L 25   BUN mg/dL 9   CREATININE mg/dL 0.84   ANION GAP mmol/L 13   CALCIUM mg/dL 9.0   ALBUMIN g/dL 3.7   TOTAL BILIRUBIN mg/dL 0.55   ALK PHOS U/L 126*   ALT U/L 12   AST U/L 23   GLUCOSE RANDOM mg/dL 308*         Results from last 7 days   Lab Units 01/13/24  0136  01/06/24  1111 01/06/24  0712   POC GLUCOSE mg/dl 269* 201* 189*               Lines/Drains:  Invasive Devices       Peripheral Intravenous Line  Duration             Peripheral IV 01/12/24 Left Antecubital <1 day                  Imaging: Reviewed radiology reports from this admission including: chest CT scan and abdominal/pelvic CT  CT chest abdomen pelvis wo contrast   Final Result by Sadiq Robison MD (01/12 5990)      1.  Nonspecific stable 2 mm perifissural nodule in the right middle lobe. Additional nonspecific subpleural 3 mm nodule in the left upper lobe. Otherwise no definitive evidence of intrathoracic metastatic disease.   2.  Small hiatal hernia.   3.  Stable spiculated mass in the pancreatic head/uncinate process region with minimal surrounding hazy stranding is again seen as described above. This could be related to tumor infiltration or sequela of mild pancreatitis, recommend clinical    correlation with pancreatic enzyme levels.   4.  Multiple ill-defined hypodense liver lesions again seen consistent with metastatic disease.   5.  Subtle wall thickening of the duodenum may be due to underdistention or represent secondary duodenitis from adjacent pancreatic process. Remaining small and large bowel loops appear normal in course and caliber without obstruction or inflammation.    Normal terminal ileum, appendix, and large bowel.   6.  Small amount of perihepatic/perisplenic and pelvic ascites noted, new since the prior study.         Workstation performed: [id number]             EKG and Other Studies Reviewed on Admission:   EKG: NSR. HR 79, reviewed in [facility] personally.    ** Please Note: This note has been constructed using a voice recognition system. **

## 2024-01-13 NOTE — ASSESSMENT & PLAN NOTE
"Lab Results   Component Value Date    HGBA1C 11.6 (H) 10/17/2023       No results for input(s): \"POCGLU\" in the last 72 hours.    Blood Sugar Average: Last 72 hrs:  Every 6 hour Accu-Chek with insulin coverage  Hold home medications and resume at discharge    "

## 2024-01-13 NOTE — PLAN OF CARE
Problem: PAIN - ADULT  Goal: Verbalizes/displays adequate comfort level or baseline comfort level  Description: Interventions:  - Encourage patient to monitor pain and request assistance  - Assess pain using appropriate pain scale  - Administer analgesics based on type and severity of pain and evaluate response  - Implement non-pharmacological measures as appropriate and evaluate response  - Consider cultural and social influences on pain and pain management  - Notify physician/advanced practitioner if interventions unsuccessful or patient reports new pain  Outcome: Progressing     Problem: INFECTION - ADULT  Goal: Absence or prevention of progression during hospitalization  Description: INTERVENTIONS:  - Assess and monitor for signs and symptoms of infection  - Monitor lab/diagnostic results  - Monitor all insertion sites, i.e. indwelling lines, tubes, and drains  - Monitor endotracheal if appropriate and nasal secretions for changes in amount and color  - [place] appropriate cooling/warming therapies per order  - Administer medications as ordered  - Instruct and encourage patient and family to use good hand hygiene technique  - Identify and instruct in appropriate isolation precautions for identified infection/condition  Outcome: Progressing  Goal: Absence of fever/infection during neutropenic period  Description: INTERVENTIONS:  - Monitor WBC    Outcome: Progressing     Problem: SAFETY ADULT  Goal: Patient will remain free of falls  Description: INTERVENTIONS:  - Educate patient/family on patient safety including physical limitations  - Instruct patient to call for assistance with activity   - Consult OT/PT to assist with strengthening/mobility   - Keep Call bell within reach  - Keep bed low and locked with side rails adjusted as appropriate  - Keep care items and personal belongings within reach  - Initiate and maintain comfort rounds  - Make Fall Risk Sign visible to staff  - Offer Toileting every 2 Hours, in  advance of need  - Initiate/Maintain bed alarm  - Obtain necessary fall risk management equipment  - Apply yellow socks and bracelet for high fall risk patients  - Consider moving patient to room near nurses station  Outcome: Progressing  Goal: Maintain or return to baseline ADL function  Description: INTERVENTIONS:  -  Assess patient's ability to carry out ADLs; assess patient's baseline for ADL function and identify physical deficits which impact ability to perform ADLs (bathing, care of mouth/teeth, toileting, grooming, dressing, etc.)  - Assess/evaluate cause of self-care deficits   - Assess range of motion  - Assess patient's mobility; develop plan if impaired  - Assess patient's need for assistive devices and provide as appropriate  - Encourage maximum independence but intervene and supervise when necessary  - Involve family in performance of ADLs  - Assess for home care needs following discharge   - Consider OT consult to assist with ADL evaluation and planning for discharge  - Provide patient education as appropriate  Outcome: Progressing  Goal: Maintains/Returns to pre admission functional level  Description: INTERVENTIONS:  - Perform AM-PAC 6 Click Basic Mobility/ Daily Activity assessment daily.  - Set and communicate daily mobility goal to care team and patient/family/caregiver.   - Collaborate with rehabilitation services on mobility goals if consulted  - Perform Range of Motion 3 times a day.  - Reposition patient every 2 hours.  - Dangle patient 3 times a day  - Stand patient 3 times a day  - Ambulate patient 3 times a day  - Out of bed to chair 3 times a day   - Out of bed for meals 3 times a day  - Out of bed for toileting  - Record patient progress and toleration of activity level   Outcome: Progressing     Problem: DISCHARGE PLANNING  Goal: Discharge to home or other facility with appropriate resources  Description: INTERVENTIONS:  - Identify barriers to discharge w/patient and caregiver  -  Arrange for needed discharge resources and transportation as appropriate  - Identify discharge learning needs (meds, wound care, etc.)  - Arrange for interpretive services to assist at discharge as needed  - Refer to Case Management Department for coordinating discharge planning if the patient needs post-hospital services based on physician/advanced practitioner order or complex needs related to functional status, cognitive ability, or social support system  Outcome: Progressing     Problem: Knowledge Deficit  Goal: Patient/family/caregiver demonstrates understanding of disease process, treatment plan, medications, and discharge instructions  Description: Complete learning assessment and assess knowledge base.  Interventions:  - Provide teaching at level of understanding  - Provide teaching via preferred learning methods  Outcome: Progressing

## 2024-01-13 NOTE — PLAN OF CARE

## 2024-01-13 NOTE — ED PROVIDER NOTES
History  Chief Complaint   Patient presents with    Abdominal Pain     Abdominal pain and constipation x 3 days. iNCREASED ble EDEMA     82 yo female with recent diagnosis of likely pancreatic cancer with liver metastasis. Presenting to the ed for severe and worsening abdominal pain with 3 days of constipation. States that she has been trying her at home pain medications with no relief of her pain, states tried to take colace at home but then immediately began vomiting. States that normally she has 2 Bms daily. Denies fevers, sore throat, cough, CP, SOB, diarrhea, dysuria, hematuria.         Prior to Admission Medications   Prescriptions Last Dose Informant Patient Reported? Taking?   Ascorbic Acid (VITAMIN C) 500 MG CAPS  Self Yes No   Sig: Take 1 tablet by mouth daily   HYDROcodone-acetaminophen (NORCO) 5-325 mg per tablet   No No   Sig: Take 1 tablet by mouth every 6 (six) hours as needed for pain for up to 7 days Max Daily Amount: 4 tablets   amLODIPine (NORVASC) 5 mg tablet   No No   Sig: Take 1 tablet (5 mg total) by mouth daily   apixaban (Eliquis) 5 mg   No No   Sig: Take 1 tablet (5 mg total) by mouth 2 (two) times a day   diphenhydrAMINE (BENADRYL) 2 % cream   No No   Sig: Apply topically 3 (three) times a day as needed for itching   Patient not taking: Reported on 1/4/2024   glimepiride (AMARYL) 4 mg tablet   No No   Sig: Take 1 tablet (4 mg total) by mouth daily with breakfast   linaGLIPtin 5 MG TABS   No No   Sig: Take 5 mg by mouth daily with or without food   metFORMIN (GLUCOPHAGE) 1000 MG tablet   No No   Sig: Take 1 tablet (1,000 mg total) by mouth 2 (two) times a day with meals   ondansetron (ZOFRAN-ODT) 4 mg disintegrating tablet   No No   Sig: Take 1 tablet (4 mg total) by mouth every 6 (six) hours as needed for nausea or vomiting for up to 5 days      Facility-Administered Medications: None       Past Medical History:   Diagnosis Date    Bereavement, uncomplicated     [id number]  RESOLVED     Coronary artery disease     Diabetes mellitus (HCC)     Hyperlipidemia     Hypertension     Lymphedema     Morbid obesity (HCC)     Myocardial infarction (HCC)        Past Surgical History:   Procedure Laterality Date    CARDIAC CATHETERIZATION      HIP SURGERY      IR BIOPSY LIVER MASS  1/5/2024    TUBAL LIGATION         Family History   Problem Relation Age of Onset    Heart disease Mother     Diabetes type II Mother     Hypertension Mother     Heart disease Sister     Diabetes type II Sister     Diabetes type II Brother     Diabetes type II Family      I have reviewed and agree with the history as documented.    E-Cigarette/Vaping    E-Cigarette Use Never User      E-Cigarette/Vaping Substances    Nicotine No     THC No     CBD No     Flavoring No     Other No     Unknown No      Social History     Tobacco Use    Smoking status: Never    Smokeless tobacco: Never   Vaping Use    Vaping status: Never Used   Substance Use Topics    Alcohol use: Never    Drug use: Never       Review of Systems   Gastrointestinal:  Positive for abdominal pain, constipation, nausea and vomiting.   All other systems reviewed and are negative.      Physical Exam  Physical Exam  Vitals and nursing note reviewed.   Constitutional:       General: She is not in acute distress.     Appearance: She is well-developed. She is not diaphoretic.   HENT:      Head: Normocephalic and atraumatic.      Right Ear: External ear normal.      Left Ear: External ear normal.      Nose: Nose normal.   Eyes:      General: No scleral icterus.        Right eye: No discharge.         Left eye: No discharge.      Conjunctiva/sclera: Conjunctivae normal.      Pupils: Pupils are equal, round, and reactive to light.   Neck:      Vascular: No JVD.      Trachea: No tracheal deviation.   Cardiovascular:      Rate and Rhythm: Normal rate and regular rhythm.      Heart sounds: Normal heart sounds. No murmur heard.     No friction rub. No gallop.   Pulmonary:      Effort:  Pulmonary effort is normal. No respiratory distress.      Breath sounds: Normal breath sounds. No stridor. No wheezing or rales.   Abdominal:      General: Bowel sounds are normal. There is no distension.      Palpations: Abdomen is soft. There is no mass.      Tenderness: There is generalized abdominal tenderness. There is no right CVA tenderness, left CVA tenderness or guarding.   Musculoskeletal:         General: No tenderness or deformity. Normal range of motion.      Cervical back: Normal range of motion and neck supple.   Skin:     General: Skin is warm and dry.      Coloration: Skin is not pale.      Findings: No erythema or rash.   Neurological:      Mental Status: She is alert and oriented to person, place, and time.      Cranial Nerves: No cranial nerve deficit.      Sensory: No sensory deficit.      Motor: No abnormal muscle tone.   Psychiatric:         Behavior: Behavior normal.         Thought Content: Thought content normal.         Judgment: Judgment normal.         Vital Signs  ED Triage Vitals   Temp Pulse Respirations BP SpO2   -- 01/12/24 2218 01/12/24 2218 -- 01/12/24 2218    86 18  97 %      Temp src Heart Rate Source Patient Position - Orthostatic VS BP Location FiO2 (%)   -- 01/12/24 2218 01/12/24 2218 01/12/24 2218 --    Monitor Lying Left arm       Pain Score       01/12/24 2215       5           Vitals:    01/12/24 2218   Pulse: 86   Patient Position - Orthostatic VS: Lying         Visual Acuity      ED Medications  Medications   sodium chloride 0.9 % bolus 1,000 mL (1,000 mL Intravenous New Bag 1/13/24 0009)   HYDROmorphone HCl (DILAUDID) injection 0.2 mg (0.2 mg Intravenous Given 1/12/24 2308)   HYDROmorphone (DILAUDID) injection 0.5 mg (0.5 mg Intravenous Given 1/13/24 0009)       Diagnostic Studies  Results Reviewed       Procedure Component Value Units Date/Time    HS Troponin I 4hr [[id number]]     Lab Status: No result Specimen: Blood     HS Troponin I 2hr [[id number]]     Lab Status:  No result Specimen: Blood     HS Troponin 0hr (reflex protocol) [[id number]]  (Normal) Collected: 01/12/24 2307    Lab Status: Final result Specimen: Blood from Arm, Left Updated: 01/12/24 2336     hs TnI 0hr 13 ng/L     Comprehensive metabolic panel [[id number]]  (Abnormal) Collected: 01/12/24 2307    Lab Status: Final result Specimen: Blood from Arm, Left Updated: 01/12/24 2330     Sodium 132 mmol/L      Potassium 4.0 mmol/L      Chloride 94 mmol/L      CO2 25 mmol/L      ANION GAP 13 mmol/L      BUN 9 mg/dL      Creatinine 0.84 mg/dL      Glucose 308 mg/dL      Calcium 9.0 mg/dL      AST 23 U/L      ALT 12 U/L      Alkaline Phosphatase 126 U/L      Total Protein 6.8 g/dL      Albumin 3.7 g/dL      Total Bilirubin 0.55 mg/dL      eGFR 65 ml/min/1.73sq m     Narrative:      National Kid[employer] Disease Foundation guidelines for Chronic Kidney Disease (CKD):     Stage 1 with normal or high GFR (GFR > 90 mL/min/1.73 square meters)    Stage 2 Mild CKD (GFR = 60-89 mL/min/1.73 square meters)    Stage 3A Moderate CKD (GFR = 45-59 mL/min/1.73 square meters)    Stage 3B Moderate CKD (GFR = 30-44 mL/min/1.73 square meters)    Stage 4 Severe CKD (GFR = 15-29 mL/min/1.73 square meters)    Stage 5 End Stage CKD (GFR <15 mL/min/1.73 square meters)  Note: GFR calculation is accurate only with a steady state creatinine    Lipase [[id number]]  (Abnormal) Collected: 01/12/24 2307    Lab Status: Final result Specimen: Blood from Arm, Left Updated: 01/12/24 2330     Lipase 590 u/L     CBC and differential [[id number]] Collected: 01/12/24 2307    Lab Status: Final result Specimen: Blood from Arm, Left Updated: 01/12/24 2313     WBC 6.36 Thousand/uL      RBC 4.29 Million/uL      Hemoglobin 12.5 g/dL      Hematocrit 38.9 %      MCV 91 fL      MCH 29.1 pg      MCHC 32.1 g/dL      RDW 12.6 %      MPV 10.6 fL      Platelets 223 Thousands/uL      nRBC 0 /100 WBCs      Neutrophils Relative 65 %      Immat GRANS % 0 %      Lymphocytes Relative 22 %       Monocytes Relative 10 %      Eosinophils Relative 2 %      Basophils Relative 1 %      Neutrophils Absolute 4.15 Thousands/µL      Immature Grans Absolute 0.02 Thousand/uL      Lymphocytes Absolute 1.41 Thousands/µL      Monocytes Absolute 0.64 Thousand/µL      Eosinophils Absolute 0.11 Thousand/µL      Basophils Absolute 0.03 Thousands/µL                    CT chest abdomen pelvis wo contrast   Final Result by Sadiq Robison MD (01/12 8106)      1.  Nonspecific stable 2 mm perifissural nodule in the right middle lobe. Additional nonspecific subpleural 3 mm nodule in the left upper lobe. Otherwise no definitive evidence of intrathoracic metastatic disease.   2.  Small hiatal hernia.   3.  Stable spiculated mass in the pancreatic head/uncinate process region with minimal surrounding hazy stranding is again seen as described above. This could be related to tumor infiltration or sequela of mild pancreatitis, recommend clinical    correlation with pancreatic enzyme levels.   4.  Multiple ill-defined hypodense liver lesions again seen consistent with metastatic disease.   5.  Subtle wall thickening of the duodenum may be due to underdistention or represent secondary duodenitis from adjacent pancreatic process. Remaining small and large bowel loops appear normal in course and caliber without obstruction or inflammation.    Normal terminal ileum, appendix, and large bowel.   6.  Small amount of perihepatic/perisplenic and pelvic ascites noted, new since the prior study.         Workstation performed: [id number]                    Procedures  ECG 12 Lead Documentation Only    Date/Time: 1/12/2024 11:58 PM    Performed by: Richard Dickens DO  Authorized by: Richard Dickens DO    Interpretation:     Interpretation: normal    Rate:     ECG rate:  79    ECG rate assessment: normal    Rhythm:     Rhythm: sinus rhythm    Ectopy:     Ectopy: none    QRS:     QRS axis:  Normal    QRS intervals:  Normal  Conduction:      Conduction: normal    ST segments:     ST segments:  Normal  T waves:     T waves: normal             ED Course  ED Course as of 01/13/24 0048   Sat Jan 13, 2024   0005 Patient with minimal to no relief reported with 0.2mg Dilaudid --> will trial 0.5mg             HEART Risk Score      Flowsheet Row Most Recent Value   Heart Score Risk Calculator    History 1 Filed at: 01/13/2024 0048   ECG 1 Filed at: 01/13/2024 0048   Age 2 Filed at: 01/13/2024 0048   Risk Factors 2 Filed at: 01/13/2024 0048   Troponin 1 Filed at: 01/13/2024 0048   HEART Score 7 Filed at: 01/13/2024 0048                          SBIRT 22yo+      Flowsheet Row Most Recent Value   Initial Alcohol Screen: US AUDIT-C     1. How often do you have a drink containing alcohol? 0 Filed at: 01/12/2024 2240   2. How many drinks containing alcohol do you have on a typical day you are drinking?  0 Filed at: 01/12/2024 2240   3a. Male UNDER 65: How often do you have five or more drinks on one occasion? 0 Filed at: 01/12/2024 2240   3b. FEMALE Any Age, or MALE 65+: How often do you have 4 or more drinks on one occassion? 0 Filed at: 01/12/2024 2240   Audit-C Score 0 Filed at: 01/12/2024 2240   LISA: How many times in the past year have you...    Used an illegal drug or used a prescription medication for non-medical reasons? Never Filed at: 01/12/2024 2240                      Medical Decision Making  Biopsy returned showing pancreatic adenocarcinoma with hepatic metastasis likely.  Blood work along with CT chest abdomen pelvis ordered.  Pain control.  IV fluids.    Noted to have elevated lipase and questionable pancreatitis on CT scan. Admitted for pain control at this time to medicine.     Amount and/or Complexity of Data Reviewed  Labs: ordered.  Radiology: ordered.    Risk  Prescription drug management.  Decision regarding hospitalization.             Disposition  Final diagnoses:   Pancreatitis   Cancer associated pain   Constipation     Time reflects when  diagnosis was documented in both MDM as applicable and the Disposition within this note       Time User Action Codes Description Comment    1/13/2024 12:14 AM Richard Dickens [K85.90] Pancreatitis     1/13/2024 12:14 AM Richard Dickens [G89.3] Cancer associated pain     1/13/2024 12:14 AM Richard Dickens [K59.00] Constipation           ED Disposition       ED Disposition   Admit    Condition   Stable    Date/Time   Sat Jan 13, 2024 0014    Comment   Case was discussed with ROGERS and the patient's admission status was agreed to be Admission Status: observation status to the service of Dr. Butler.               Follow-up Information    None         Patient's Medications   Discharge Prescriptions    No medications on file       No discharge procedures on file.    PDMP Review       None            ED Provider  Electronically Signed by             Richard Dickens DO  01/13/24 0048

## 2024-01-13 NOTE — ACP (ADVANCE CARE PLANNING)
Advanced Care Planning Progress Note    Serious Illness Conversation    1. What is your understanding now of where you are with your illness?  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  She understands that she does not have a good prognosis, and understands that she probably will not be around beyond a couple of months.  She reports that under no circumstances does she want chemotherapy because she is so what chemotherapy did to a family member who had breast cancer.  The patient is leaning towards just wanting to be as comfortable as possible.     2. How much information about what is likely to be ahead with your illness would you like to have?  Information: patient wants to be fully informed     3. What did you (clinician) communicate to the patient?  Prognostic Communication: Time - I wish we were not in this situation, but I am worried that time may be as short as weeks to months.     4. If your health situation worsens, what are your most important goals?  Goals: be at home, be emotionally at peace, be physically comfortable, be spiritually at peace, have my medical decisions respected, not be a burden     5. What are the biggest fears and worries about the future and your health?  Fears/Worries: being alone, being an emotional burden, burdening others, loss of dignity, loss of mobility, pain     6. What abilities are so critical to your life that you cannot imagine living without them?  Unacceptable Function: being chronically confused or not being myself, being in chronic severe pain, being in pain or very uncomfortable     7. What gives you strength as you think about the future with your illness?  Full nadeem in god     8. If you become sicker, how much are you willing to go through for the possibility of gaining more time?  Be in the  hospital: No Have a feeding tube: No   Be in the ICU: No Live in a nursing home: No   Be on a ventilator: No Be uncomfortable: No   Be on dialysis: No Undergo aggressive test and/or procedures: No   9. How much does your proxy and family know about your priorities and wishes?  Discussion Discussion: extensive discussion with family about goals and wishes     I’ve heard you say that remaining comfortable, and being at home is really important to you. Keeping that in mind, and what we know about your illness, I recommend that we pursue a hospice evaluation. This will help us make sure that your treatment plans reflect what’s important to you.     How does this plan sound to you? I will do everything I can to help you through this.  Patient verbalized understanding of the plan as did the patient's son Andre        Advanced directives  Five Wishes: Patient does not have Five Wishes- would not like information     Time spent having this conversation was 40 minutes    Zoe Butler MD

## 2024-01-13 NOTE — HOSPICE NOTE
Hospice referral received and pt is approved for home hospice .  [facility] to [facility] to review medicare hospice benefit and answer all questions.  Jose need time to discuss with family to come up with a plan for discharge.  Liaison will continue to follow

## 2024-01-13 NOTE — CASE MANAGEMENT
Case Management Assessment & Discharge Planning Note    Patient name Candi Carpenter  Location /-01 MRN [id number]  : 1940 Date 2024       Current Admission Date: 2024  Current Admission Diagnosis:Pancreatic adenocarcinoma with liver mets (HCC)   Patient Active Problem List    Diagnosis Date Noted    Pancreatic adenocarcinoma with liver mets (HCC) 2024    Superior mesenteric vein thrombosis (HCC) 2024    Elevated lipase 2024    Abdominal pain 2024    Hyponatremia 2024    Callus 2023    Onychomycosis of toenail 2023    Trachyonychia 2023    Eczema 2023    Stage 3 chronic kidney disease, unspecified whether stage 3a or 3b CKD (HCC) 2023    Pruritic dermatitis 2023    Vaccination not carried out because of patient refusal 2021    Cotton wool exudates 06/15/2021    Coronary artery disease involving native coronary artery of native heart without angina pectoris 2020    Chronic right-sided low back pain with right-sided sciatica 2020    Type 2 diabetes mellitus with stage 3 chronic kidney disease, without long-term current use of insulin, unspecified whether stage 3a or 3b CKD (HCC)     Hyperlipidemia 2013    Allergic rhinitis 2013    Hypertension 2012    Esophageal reflux 2012      LOS (days): 0  Geometric Mean LOS (GMLOS) (days):   Days to GMLOS:     OBJECTIVE:  PATIENT READMITTED TO HOSPITAL        Current admission status: Inpatient    Preferred Pharmacy:   [facility] [place] [id number]  Phone: [phone] Fax: [phone]    Primary Care Provider: Ming Paredes MD    Primary Insurance: [employer]  Secondary Insurance:     ASSESSMENT:  Active Health Care Proxies       Cal Narvaez [facility] Care Representative - Child   Primary Phone: [phone] (Home)                 Advance  STOP Toujeo 65 each morning    Start Novolin 70/30 insulin: 30 units with breakfast and 30 units for dinner, to be adjusted    Glimepiride 4mg each morning before breakfast    Metformin ER, 1000 mg BID    Victoza, 1.8mg daily      Please note our new policy, you must arrive to the clinic 15 minutes before your appointment time to allow enough time for proper check-in, adequate time to spend with your doctor, and also to respect the appointment time of the next patient. Not arriving 15 minutes in advance may result in having your appointment rescheduled for the next available day/time.  ----------------------------------------------------------------------------------------------------------------------    Below you will find a glucose log sheet which you can use to record your blood sugars. Without checking and recording what your home glucose levels are, it will be difficult to make any changes to your medication dose, even when significant changes may be needed. Please feel free to use the log below to record your home glucose levels. At the very least, I would like for you to login the entire 2-3 weeks just before your visit so we can make your visit much more productive and beneficial to you. GLUCOSE LOG SHEET:    Date Breakfast Lunch Dinner Bedtime Comments ? GLUCOSE LOG SHEET:    Date Breakfast Lunch Dinner Bedtime Comments ? GLUCOSE LOG SHEET:    Date Breakfast Lunch Dinner Bedtime Comments ? Directives  Does patient have a Health Care POA?: Yes  Does patient have Advance Directives?: Yes  Advance Directives: Living will  Primary Contact: Cal Narvaez son    Readmission Root Cause  30 Day Readmission: No    Patient Information  Admitted from:: Home  Mental Status: Alert  During Assessment patient was accompanied by: Son  Assessment information provided by:: Patient  Primary Caregiver: Self  Support Systems: Son, Spouse/significant other  County of Residence: [place]  What city do you live in?: [place]  Home entry access options. Select all that apply.: Stairs, Ramp  Number of steps to enter home.: 3  Type of Current Residence: [facility]  Living Arrangements: Lives w/ Spouse/significant other  Is patient a ?: No    Activities of Daily Living Prior to Admission  Functional Status: Independent  Completes ADLs independently?: Yes  Ambulates independently?: Yes  Does patient use assisted devices?: Yes  Assisted Devices (DME) used: Straight Cane  Does patient currently own DME?: Yes  What DME does the patient currently own?: Shower Chair, Walker, Bedside Commode  Does patient have a history of Outpatient Therapy (PT/OT)?: Yes  Does the patient have a history of Short-Term Rehab?: No  Does patient have a history of HHC?: No  Does patient currently have HHC?: No    Patient Information Continued  Income Source: Pension/senior living  Does patient have prescription coverage?: Yes  Does patient receive dialysis treatments?: No  Does patient have a history of substance abuse?: No  Does patient have a history of Mental Health Diagnosis?: No    PHQ 2/9 Screening   Reviewed PHQ 2/9 Depression Screening Score?: No    Means of Transportation  Means of Transport to Appts:: Family transport  Housing Stability: Low Risk  (1/13/2024)    Housing Stability Vital Sign     Unable to Pay for Housing in the Last Year: No     Number of Places Lived in the Last Year: 1     Unstable Housing in the Last Year: No   Food Insecurity: No  Food Insecurity (1/13/2024)    Hunger Vital Sign     Worried About Running Out of Food in the Last Year: Never true     Ran Out of Food in the Last Year: Never true   Transportation Needs: No Transportation Needs (1/13/2024)    PRAPARE - Transportation     Lack of Transportation (Medical): No     Lack of Transportation (Non-Medical): No   Utilities: Not At Risk (1/13/2024)    [facility] Utilities     Threatened with loss of utilities: No       DISCHARGE DETAILS:  Received a referral for hospice.  Spoke to the pt and the son about same.  Made a referral for hospice via [facility].  Pt had [facility] in past and would like to stay with same.  Pt wants to go home with hospice.

## 2024-01-13 NOTE — UTILIZATION REVIEW
Initial Clinical Review    WAS OBSERVATION 1/13/24 @ 0013 CONVERTED TO INPATIENT ADMISSION 1/13/24 @ 1028 DUE TO CONTINUED STAY REQUIRED TO CARE FOR PATIENT WITH ABDOMINAL PAIN.    Admission: Date/Time/Statement:   Admission Orders (From admission, onward)       Ordered        01/13/24 1028  Inpatient Admission  Once            01/13/24 0013  Place in Observation  Once                          Orders Placed This Encounter   Procedures    Inpatient Admission     Standing Status:   Standing     Number of Occurrences:   1     Order Specific Question:   Level of Care     Answer:   Med Surg [16]     Order Specific Question:   Estimated length of stay     Answer:   More than 2 Midnights     Order Specific Question:   Certification     Answer:   I certify that inpatient services are medically necessary for this patient for a duration of greater than two midnights. See H&P and MD Progress Notes for additional information about the patient's course of treatment.     ED Arrival Information       Expected   -    Arrival   1/12/2024 21:55    Acuity   Urgent              Means of arrival   Walk-In    Escorted by   Family Member    Service   Hospitalist    Admission type   Emergency              Arrival complaint   Constipation. Abdominal pain.             Chief Complaint   Patient presents with    Abdominal Pain     Abdominal pain and constipation x 3 days. iNCREASED ble EDEMA       Initial Presentation: 83 y.o. female to ED via walk-in from home   Present to ED with abdominal pain and constipation. Patient reports pain epigastric area with vomiting lots of phlegm, notes constipation with no BM x 3 days. Has not tolerated po intake. Feels weak, notes weight loss, reports 202 down to 173 since before [place]. reports taking the Norco at home with no relief. Abdomen feels bloated.    PMHX HTN, HLD, obesity, superior mesenteric vein thrombosis on Eliquis, recently diagnosed pancreatic mass with liver mets   Admitted to OBS with  DX: Pancreatic adenocarcinoma with liver mets   on exam: abdominal tenderness; Pain 5/10; weakness; Lipase elevated to 590, previously was 322 on January 4  PLAN: cont ivf; pain / nausea control (see below); monitor labs; Accuchecks with ssic; NPO; consult GI      Date: 1/13/23 - CHANGED TO INPATIENT  continues to complain of severe constipation. She feels that her belly is further distended.  She has ongoing belly discomfort.   Plan: cont ivf; pain / nausea control (see below); monitor labs; Accuchecks with ssic; NPO    GI CONSULT  recent diagnosis of metastatic pancreatic adenocarcinoma with diffuse hepatic metastasis, SMV thrombosis, presenting with worsening abdominal pain and constipation. Suspect abdominal pain is multifactorial secondary to known pancreatic mass with hepatic metastasis, possible component of pancreatitis, and opioid induced constipation.  Plan: cont ivf; clear liq diet - advance as chun; pain / nausea control; Bowel regimen changed to MiraLAX twice daily +  Bisacodyl suppositories can be used if she is having difficulty tolerating oral medications and still not having bowel movements     Date: 1/14/24    Day 3: Has surpassed a 2nd midnight with active treatments and services, which include hospice referral made and is approved for home hospice.        ED Triage Vitals   Temperature Pulse Respirations Blood Pressure SpO2   01/13/24 0116 01/12/24 2218 01/12/24 2218 01/13/24 0116 01/12/24 2218   98.1 °F (36.7 °C) 86 18 139/77 97 %      Temp Source Heart Rate Source Patient Position - Orthostatic VS BP Location FiO2 (%)   01/13/24 0652 01/12/24 2218 01/12/24 2218 01/12/24 2218 --   Oral Monitor Lying Left arm       Pain Score       01/12/24 2215       5          Wt Readings from Last 1 Encounters:   01/13/24 85.2 kg (187 lb 13.3 oz)     Additional Vital Signs:   Date/Time Temp Pulse Resp BP MAP (mmHg) SpO2 O2 Device Patient Position - Orthostatic VS   01/14/24 07:22:25 98.2 °F (36.8 °C) 86 16  139/72 94 93 % -- Lying   01/13/24 23:13:42 98.2 °F (36.8 °C) 86 18 141/74 96 96 % -- --     Date/Time Temp Pulse Resp BP MAP (mmHg) SpO2 O2 Device Patient Position - Orthostatic VS   01/13/24 06:52:44 97.7 °F (36.5 °C) 77 19 108/57 74 93 % -- --   01/13/24 01:16:11 98.1 °F (36.7 °C) 80 20 139/77 98 97 % -- --   01/12/24 2218 -- 86 18 -- -- 97 % None (Room air) Lying       EKG: NSR. HR 79         Pertinent Labs/Diagnostic Test Results:   CT chest abdomen pelvis wo contrast   Final Result by Sadiq Robison MD (01/12 2352)      1.  Nonspecific stable 2 mm perifissural nodule in the right middle lobe. Additional nonspecific subpleural 3 mm nodule in the left upper lobe. Otherwise no definitive evidence of intrathoracic metastatic disease.   2.  Small hiatal hernia.   3.  Stable spiculated mass in the pancreatic head/uncinate process region with minimal surrounding hazy stranding is again seen as described above. This could be related to tumor infiltration or sequela of mild pancreatitis, recommend clinical    correlation with pancreatic enzyme levels.   4.  Multiple ill-defined hypodense liver lesions again seen consistent with metastatic disease.   5.  Subtle wall thickening of the duodenum may be due to underdistention or represent secondary duodenitis from adjacent pancreatic process. Remaining small and large bowel loops appear normal in course and caliber without obstruction or inflammation.    Normal terminal ileum, appendix, and large bowel.   6.  Small amount of perihepatic/perisplenic and pelvic ascites noted, new since the prior study.         Workstation performed: [id number]              Results from last 7 days   Lab Units 01/13/24  0422 01/12/24  2307   WBC Thousand/uL 6.26 6.36   HEMOGLOBIN g/dL 10.8* 12.5   HEMATOCRIT % 33.8* 38.9   PLATELETS Thousands/uL 175 223   NEUTROS ABS Thousands/µL  --  4.15        Results from last 7 days   Lab Units 01/13/24  0422 01/12/24  2307   SODIUM mmol/L 133* 132*    POTASSIUM mmol/L 4.3 4.0   CHLORIDE mmol/L 99 94*   CO2 mmol/L 27 25   ANION GAP mmol/L 7 13   BUN mg/dL 8 9   CREATININE mg/dL 0.71 0.84   EGFR ml/min/1.73sq m 80 65   CALCIUM mg/dL 8.3* 9.0     Results from last 7 days   Lab Units 01/12/24  2307   AST U/L 23   ALT U/L 12   ALK PHOS U/L 126*   TOTAL PROTEIN g/dL 6.8   ALBUMIN g/dL 3.7   TOTAL BILIRUBIN mg/dL 0.55     Results from last 7 days   Lab Units 01/14/24  0721 01/14/24  0112 01/13/24  2037 01/13/24  1658 01/13/24  1158 01/13/24  0544 01/13/24  0136   POC GLUCOSE mg/dl 197* 178* 158* 200* 183* 225* 269*     Results from last 7 days   Lab Units 01/13/24  0422 01/12/24  2307   GLUCOSE RANDOM mg/dL 268* 308*        Results from last 7 days   Lab Units 01/13/24  0347 01/13/24  0126 01/12/24  2307   HS TNI 0HR ng/L  --   --  13   HS TNI 2HR ng/L  --  15  --    HSTNI D2 ng/L  --  2  --    HS TNI 4HR ng/L 14  --   --    HSTNI D4 ng/L 1  --   --         Results from last 7 days   Lab Units 01/12/24  2307   LIPASE u/L 590*          ED Treatment:   Medication Administration from 01/12/2024 2155 to 01/13/2024 0056         Date/Time Order Dose Route Action     01/12/2024 2308 EST HYDROmorphone HCl (DILAUDID) injection 0.2 mg 0.2 mg Intravenous Given     01/13/2024 0009 EST sodium chloride 0.9 % bolus 1,000 mL 1,000 mL Intravenous New Bag     01/13/2024 0009 EST HYDROmorphone (DILAUDID) injection 0.5 mg 0.5 mg Intravenous Given            Present on Admission:   Type 2 diabetes mellitus with stage 3 chronic kidney disease, without long-term current use of insulin, unspecified whether stage 3a or 3b CKD (HCC)   Superior mesenteric vein thrombosis (HCC)   Hyponatremia   Elevated lipase   Pancreatic adenocarcinoma with liver mets (HCC)   Coronary artery disease involving native coronary artery of native heart without angina pectoris      Admitting Diagnosis: Pancreatitis [K85.90]  Abdominal pain [R10.9]  Constipation [K59.00]  Cancer associated pain [G89.3]    Age/Sex: 83  y.o. female    Admission Orders: SCDs; clear liq diet    Scheduled Medications:  amLODIPine, 5 mg, Oral, Daily  apixaban, 5 mg, Oral, BID  ascorbic acid, 500 mg, Oral, Daily  docusate sodium, 100 mg, Oral, BID  insulin lispro, 1-6 Units, Subcutaneous, Q6H MIKE  polyethylene glycol, 17 g, Oral, BID      Continuous IV Infusions:  sodium chloride, 100 mL/hr, Intravenous, Continuous      PRN Meds:  bisacodyl, 10 mg, Rectal, Daily PRN  HYDROmorphone, 0.5 mg, Intravenous, Q4H PRN  (1/13 rec'd x1 so far today)   ondansetron, 4 mg, Intravenous, Q6H PRN  oxyCODONE, 10 mg, Oral, Q6H PRN  oxyCODONE, 2.5 mg, Oral, Q6H PRN  (1/13 rec'd x1 so far today)         IP CONSULT TO GASTROENTEROLOGY  IP CONSULT TO HOSPICE    Network Utilization Review Department  ATTENTION: Please call with any questions or concerns to [phone] and carefully listen to the prompts so that you are directed to the right person. All voicemails are confidential.   For Discharge needs, contact Care Management DC Support Team at [phone] opt. 2  Send all requests for admission clinical reviews, approved or denied determinations and any other requests to dedicated fax number below belonging to the campus where the patient is receiving treatment. List of dedicated fax numbers for the Facilities:  FACILITY NAME UR FAX NUMBER   ADMISSION DENIALS (Administrative/Medical Necessity) [phone]   DISCHARGE SUPPORT TEAM (NETWORK) [phone]   PARENT CHILD HEALTH (Maternity/NICU/Pediatrics) [phone]   [facility] [phone]   [facility] [phone]   [facility] [phone]   [facility] [phone]   [facility] [phone]   [facility] [phone]   [facility] [phone]   [facility] [phone]   Critical access hospital  [facility] [phone]   [facility] [phone]   [facility] [phone]

## 2024-01-14 PROBLEM — Z71.89 GOALS OF CARE, COUNSELING/DISCUSSION: Status: ACTIVE | Noted: 2024-01-14

## 2024-01-14 LAB
GLUCOSE SERPL-MCNC: 178 MG/DL (ref 65–140)
GLUCOSE SERPL-MCNC: 182 MG/DL (ref 65–140)
GLUCOSE SERPL-MCNC: 191 MG/DL (ref 65–140)
GLUCOSE SERPL-MCNC: 197 MG/DL (ref 65–140)
GLUCOSE SERPL-MCNC: 204 MG/DL (ref 65–140)

## 2024-01-14 PROCEDURE — 99232 SBSQ HOSP IP/OBS MODERATE 35: CPT | Performed by: HOSPITALIST

## 2024-01-14 PROCEDURE — 82948 REAGENT STRIP/BLOOD GLUCOSE: CPT

## 2024-01-14 PROCEDURE — 99232 SBSQ HOSP IP/OBS MODERATE 35: CPT | Performed by: INTERNAL MEDICINE

## 2024-01-14 RX ORDER — INSULIN GLARGINE 100 [IU]/ML
15 INJECTION, SOLUTION SUBCUTANEOUS
Status: DISCONTINUED | OUTPATIENT
Start: 2024-01-14 | End: 2024-01-17 | Stop reason: HOSPADM

## 2024-01-14 RX ORDER — INSULIN LISPRO 100 [IU]/ML
1-6 INJECTION, SOLUTION INTRAVENOUS; SUBCUTANEOUS
Status: DISCONTINUED | OUTPATIENT
Start: 2024-01-15 | End: 2024-01-17 | Stop reason: HOSPADM

## 2024-01-14 RX ORDER — INSULIN LISPRO 100 [IU]/ML
1-5 INJECTION, SOLUTION INTRAVENOUS; SUBCUTANEOUS
Status: DISCONTINUED | OUTPATIENT
Start: 2024-01-14 | End: 2024-01-17 | Stop reason: HOSPADM

## 2024-01-14 RX ADMIN — APIXABAN 5 MG: 5 TABLET, FILM COATED ORAL at 17:19

## 2024-01-14 RX ADMIN — INSULIN LISPRO 2 UNITS: 100 INJECTION, SOLUTION INTRAVENOUS; SUBCUTANEOUS at 17:19

## 2024-01-14 RX ADMIN — OXYCODONE HYDROCHLORIDE AND ACETAMINOPHEN 500 MG: 500 TABLET ORAL at 10:19

## 2024-01-14 RX ADMIN — INSULIN LISPRO 1 UNITS: 100 INJECTION, SOLUTION INTRAVENOUS; SUBCUTANEOUS at 21:55

## 2024-01-14 RX ADMIN — INSULIN LISPRO 2 UNITS: 100 INJECTION, SOLUTION INTRAVENOUS; SUBCUTANEOUS at 07:57

## 2024-01-14 RX ADMIN — AMLODIPINE BESYLATE 5 MG: 5 TABLET ORAL at 10:19

## 2024-01-14 RX ADMIN — OXYCODONE HYDROCHLORIDE 10 MG: 10 TABLET ORAL at 07:57

## 2024-01-14 RX ADMIN — HYDROMORPHONE HYDROCHLORIDE 0.5 MG: 1 INJECTION, SOLUTION INTRAMUSCULAR; INTRAVENOUS; SUBCUTANEOUS at 10:19

## 2024-01-14 RX ADMIN — INSULIN GLARGINE 15 UNITS: 100 INJECTION, SOLUTION SUBCUTANEOUS at 21:55

## 2024-01-14 RX ADMIN — INSULIN LISPRO 1 UNITS: 100 INJECTION, SOLUTION INTRAVENOUS; SUBCUTANEOUS at 12:04

## 2024-01-14 RX ADMIN — APIXABAN 5 MG: 5 TABLET, FILM COATED ORAL at 10:19

## 2024-01-14 NOTE — CONSULTS
Oncology Virtual Consult Note  Candi Carpenter 83 y.o. female MRN: [id number]  Unit/Bed#: -01 Encounter: [id number]      Reason for consult: New diagnosis of pancreatic cancer    Assessment and recommendation:  This is a 83-year-old lady with multiple comorbidities who is now diagnosed to have metastatic pancreatic cancer  We spent a considerable amount of time today going over her course so far, natural history of pancreatic cancer, prognostic information, NCCN guidelines for management.  We discussed management for metastatic pancreatic cancer would be with systemic therapy.  Discussed without treatment, her life expectancy is less than 4 to 6 months.  Depending on how she tolerates treatment, average life expectancy with chemotherapy for pancreatic cancer is on average around 12 months.  Side effects of chemotherapy drugs were discussed with the patient at length. Patient has already decided she wants to move forward with hospice. She does not by any means want to entertain the idea of chemotherapy. No further recommendation from our side. Please do not hesitate to reach out with any questions/concerns.     Raúl Hills MD    History of Presenting Illness:  This is a 83-year-old lady with a past medical history of diabetes, hyperlipidemia, hypertension, who presented to the emergency room on January 12 with complaints of severe worsening abdominal pain and constipation.  Her symptoms started in December 2023 and she was admitted to the hospital on December 26.  CT scan of the abdomen and pelvis at that time showed a pancreatic head enlargement with atrophia of the pancreatic body and tail, ill-defined numerous hypodense lesions throughout the liver measuring up to 3.4 cm.  She underwent a liver biopsy by IR on January 5 and pathology of this was consistent with metastatic adenocarcinoma with extensive necrosis.  CA 19-9 was 6677.  Patient has an outpatient appointment with Dr. Burt tomorrow.  She is  now hospitalized for ongoing pain control.  She is receiving Dilaudid 0.5 mg every 4 hours as needed for pain.  She also has oxycodone 10 mg every 6 hours as needed.  Palliative care consult is in place.  GI has evaluated the patient.    Oncology has been consulted for further management of pancreatic cancer    Review of Systems - c/o abdominal pain. Better controlled on pain meds      Past Medical History:   Diagnosis Date    Bereavement, uncomplicated     [id number]  RESOLVED    Coronary artery disease     Diabetes mellitus (HCC)     Hyperlipidemia     Hypertension     Lymphedema     Morbid obesity (HCC)     Myocardial infarction (HCC)        Social History     Socioeconomic History    Marital status:      Spouse name: None    Number of children: None    Years of education: None    Highest education level: None   Occupational History    Occupation: RETIRED   Tobacco Use    Smoking status: Never    Smokeless tobacco: Never   Vaping Use    Vaping status: Never Used   Substance and Sexual Activity    Alcohol use: Never    Drug use: Never    Sexual activity: Not Currently   Other Topics Concern    None   Social History Narrative    [employer]     Social Determinants of Health     Financial Resource Strain: Low Risk  (2/1/2023)    Overall Financial Resource Strain (CARDIA)     Difficulty of Paying Living Expenses: Not very hard   Food Insecurity: No Food Insecurity (1/13/2024)    Hunger Vital Sign     Worried About Running Out of Food in the Last Year: Never true     Ran Out of Food in the Last Year: Never true   Transportation Needs: No Transportation Needs (1/13/2024)    PRAPARE - Transportation     Lack of Transportation (Medical): No     Lack of Transportation (Non-Medical): No   Physical Activity: Not on file   Stress: Not on file   Social Connections: Not on file   Intimate Partner Violence: Not on file   Housing Stability: Low Risk  (1/13/2024)    Housing Stability Vital Sign     Unable to Pay  for Housing in the Last Year: No     Number of Places Lived in the Last Year: 1     Unstable Housing in the Last Year: No       Family History   Problem Relation Age of Onset    Heart disease Mother     Diabetes type II Mother     Hypertension Mother     Heart disease Sister     Diabetes type II Sister     Diabetes type II Brother     Diabetes type II Family        Allergies   Allergen Reactions    Jardiance [Empagliflozin]     Prandin [Repaglinide] Edema    Sulfa Antibiotics Other (See Comments)    Zinc Acetate Rash         Current Facility-Administered Medications:     amLODIPine (NORVASC) tablet 5 mg, 5 mg, Oral, Daily, EDNA Daniel-BENJA, 5 mg at 01/14/24 1019    apixaban (ELIQUIS) tablet 5 mg, 5 mg, Oral, BID, Ro Perkins PA-C, 5 mg at 01/14/24 1019    ascorbic acid (VITAMIN C) tablet 500 mg, 500 mg, Oral, Daily, Ro Perkins PA-C, 500 mg at 01/14/24 1019    bisacodyl (DULCOLAX) rectal suppository 10 mg, 10 mg, Rectal, Daily PRN, Risa Wyman DO    docusate sodium (COLACE) capsule 100 mg, 100 mg, Oral, BID, EDNA Daniel-BENJA, 100 mg at 01/13/24 1750    HYDROmorphone (DILAUDID) injection 0.5 mg, 0.5 mg, Intravenous, Q4H PRN, Ro Perkins PA-C, 0.5 mg at 01/14/24 1019    insulin glargine (LANTUS) subcutaneous injection 15 Units 0.15 mL, 15 Units, Subcutaneous, HS, Zoe Butler MD    insulin lispro (HumaLOG) 100 units/mL subcutaneous injection 1-6 Units, 1-6 Units, Subcutaneous, TID With Meals, 1 Units at 01/14/24 1204 **AND** Fingerstick Glucose (POCT), , , Q6H, Zoe Butler MD    ondansetron (ZOFRAN) injection 4 mg, 4 mg, Intravenous, Q6H PRN, Ro Perkins PA-C    oxyCODONE (ROXICODONE) immediate release tablet 10 mg, 10 mg, Oral, Q6H PRN, Ro Perkins PA-C, 10 mg at 01/14/24 0757    oxyCODONE (ROXICODONE) split tablet 2.5 mg, 2.5 mg, Oral, Q6H PRN, Ro Perkins PA-C, 2.5 mg at 01/13/24  "0425    polyethylene glycol (MIRALAX) packet 17 g, 17 g, Oral, BID, Risa L Zamzam, DO, 17 g at 01/13/24 2041      /67   Pulse 101   Temp 98.2 °F (36.8 °C) (Axillary)   Resp 16   Ht 4' 11\" (1.499 m)   Wt 85.2 kg (187 lb 13.3 oz)   SpO2 92%   BMI 37.94 kg/m²         Recent Results (from the past 48 hour(s))   CBC and differential    Collection Time: 01/12/24 11:07 PM   Result Value Ref Range    WBC 6.36 4.31 - 10.16 Thousand/uL    RBC 4.29 3.81 - 5.12 Million/uL    Hemoglobin 12.5 11.5 - 15.4 g/dL    Hematocrit 38.9 34.8 - 46.1 %    MCV 91 82 - 98 fL    MCH 29.1 26.8 - 34.3 pg    MCHC 32.1 31.4 - 37.4 g/dL    RDW 12.6 11.6 - 15.1 %    MPV 10.6 8.9 - 12.7 fL    Platelets 223 149 - 390 Thousands/uL    nRBC 0 /100 WBCs    Neutrophils Relative 65 43 - 75 %    Immat GRANS % 0 0 - 2 %    Lymphocytes Relative 22 14 - 44 %    Monocytes Relative 10 4 - 12 %    Eosinophils Relative 2 0 - 6 %    Basophils Relative 1 0 - 1 %    Neutrophils Absolute 4.15 1.85 - 7.62 Thousands/µL    Immature Grans Absolute 0.02 0.00 - 0.20 Thousand/uL    Lymphocytes Absolute 1.41 0.60 - 4.47 Thousands/µL    Monocytes Absolute 0.64 0.17 - 1.22 Thousand/µL    Eosinophils Absolute 0.11 0.00 - 0.61 Thousand/µL    Basophils Absolute 0.03 0.00 - 0.10 Thousands/µL   Comprehensive metabolic panel    Collection Time: 01/12/24 11:07 PM   Result Value Ref Range    Sodium 132 (L) 135 - 147 mmol/L    Potassium 4.0 3.5 - 5.3 mmol/L    Chloride 94 (L) 96 - 108 mmol/L    CO2 25 21 - 32 mmol/L    ANION GAP 13 mmol/L    BUN 9 5 - 25 mg/dL    Creatinine 0.84 0.60 - 1.30 mg/dL    Glucose 308 (H) 65 - 140 mg/dL    Calcium 9.0 8.4 - 10.2 mg/dL    AST 23 13 - 39 U/L    ALT 12 7 - 52 U/L    Alkaline Phosphatase 126 (H) 34 - 104 U/L    Total Protein 6.8 6.4 - 8.4 g/dL    Albumin 3.7 3.5 - 5.0 g/dL    Total Bilirubin 0.55 0.20 - 1.00 mg/dL    eGFR 65 ml/min/1.73sq m   Lipase    Collection Time: 01/12/24 11:07 PM   Result Value Ref Range    Lipase 590 (H) " "11 - 82 u/L   HS Troponin 0hr (reflex protocol)    Collection Time: 01/12/24 11:07 PM   Result Value Ref Range    hs TnI 0hr 13 \"Refer to ACS Flowchart\"- see link ng/L   ECG 12 lead    Collection Time: 01/12/24 11:09 PM   Result Value Ref Range    Ventricular Rate 79 BPM    Atrial Rate 79 BPM    MO Interval 142 ms    QRSD Interval 94 ms    QT Interval 374 ms    QTC Interval 428 ms    P Axis 62 degrees    QRS Axis -17 degrees    T Wave [place] 52 degrees   HS Troponin I 2hr    Collection Time: 01/13/24  1:26 AM   Result Value Ref Range    hs TnI 2hr 15 \"Refer to ACS Flowchart\"- see link ng/L    Delta 2hr hsTnI 2 <20 ng/L   Fingerstick Glucose (POCT)    Collection Time: 01/13/24  1:36 AM   Result Value Ref Range    POC Glucose 269 (H) 65 - 140 mg/dl   HS Troponin I 4hr    Collection Time: 01/13/24  3:47 AM   Result Value Ref Range    hs TnI 4hr 14 \"Refer to ACS Flowchart\"- see link ng/L    Delta 4hr hsTnI 1 <20 ng/L   Basic metabolic panel    Collection Time: 01/13/24  4:22 AM   Result Value Ref Range    Sodium 133 (L) 135 - 147 mmol/L    Potassium 4.3 3.5 - 5.3 mmol/L    Chloride 99 96 - 108 mmol/L    CO2 27 21 - 32 mmol/L    ANION GAP 7 mmol/L    BUN 8 5 - 25 mg/dL    Creatinine 0.71 0.60 - 1.30 mg/dL    Glucose 268 (H) 65 - 140 mg/dL    Calcium 8.3 (L) 8.4 - 10.2 mg/dL    eGFR 80 ml/min/1.73sq m   CBC (With Platelets)    Collection Time: 01/13/24  4:22 AM   Result Value Ref Range    WBC 6.26 4.31 - 10.16 Thousand/uL    RBC 3.67 (L) 3.81 - 5.12 Million/uL    Hemoglobin 10.8 (L) 11.5 - 15.4 g/dL    Hematocrit 33.8 (L) 34.8 - 46.1 %    MCV 92 82 - 98 fL    MCH 29.4 26.8 - 34.3 pg    MCHC 32.0 31.4 - 37.4 g/dL    RDW 12.7 11.6 - 15.1 %    Platelets 175 149 - 390 Thousands/uL    MPV 11.6 8.9 - 12.7 fL   Fingerstick Glucose (POCT)    Collection Time: 01/13/24  5:44 AM   Result Value Ref Range    POC Glucose 225 (H) 65 - 140 mg/dl   Fingerstick Glucose (POCT)    Collection Time: 01/13/24 11:58 AM   Result Value Ref Range    " POC Glucose 183 (H) 65 - 140 mg/dl   Fingerstick Glucose (POCT)    Collection Time: 01/13/24  4:58 PM   Result Value Ref Range    POC Glucose 200 (H) 65 - 140 mg/dl   Fingerstick Glucose (POCT)    Collection Time: 01/13/24  8:37 PM   Result Value Ref Range    POC Glucose 158 (H) 65 - 140 mg/dl   Fingerstick Glucose (POCT)    Collection Time: 01/14/24  1:12 AM   Result Value Ref Range    POC Glucose 178 (H) 65 - 140 mg/dl   Fingerstick Glucose (POCT)    Collection Time: 01/14/24  7:21 AM   Result Value Ref Range    POC Glucose 197 (H) 65 - 140 mg/dl   Fingerstick Glucose (POCT)    Collection Time: 01/14/24 12:04 PM   Result Value Ref Range    POC Glucose 182 (H) 65 - 140 mg/dl         CT chest abdomen pelvis wo contrast    Result Date: 1/12/2024  Narrative: CT CHEST, ABDOMEN AND PELVIS WITHOUT IV CONTRAST INDICATION: severe abdominal pain with constipation, known pancreatitc mass with possible hepatic mets. COMPARISON: 1/3/2024. TECHNIQUE: CT examination of the chest, abdomen and pelvis was performed without intravenous contrast. Multiplanar 2D reformatted images were created from the source data. This examination, like all CT scans performed [facility], was performed utilizing techniques to minimize radiation dose exposure, including the use of iterative reconstruction and automated exposure control. Radiation dose length product (DLP) for this visit: 854 mGy-cm Enteric contrast was administered. FINDINGS: Exam limited by mild motion artifact. CHEST LUNGS: Central airways are patent. There is no tracheal or endobronchial lesion. No lobar airspace consolidation/pneumonia. No suspicious pulmonary parenchymal mass. Nonspecific stable 2 mm perifissural nodule in the right middle lobe. Additional nonspecific subpleural 3 mm nodule in the left upper lobe. PLEURA: Unremarkable without pneumothorax or pleural effusions. HEART/GREAT VESSELS: Heart is unremarkable for patient's age. No pericardial  effusion. No thoracic aortic aneurysm. Mild scattered calcific atherosclerosis of the thoracic aorta, coronary arteries, and proximal thoracic great vessels again seen. MEDIASTINUM AND LORI: Unremarkable without mass or lymphadenopathy. Visualized thyroid glands are unremarkable. Esophagus is normal in course and caliber. Small hiatal hernia. No prevertebral soft tissue swelling or mass noted. CHEST WALL AND LOWER NECK: Unremarkable. ABDOMEN LIVER/BILIARY TREE: Liver is normal in size and contour. Multiple ill-defined hypodense lesions throughout the liver are seen predominantly in the right hepatic lobe likely representing metastatic disease. Subtle periportal edema in the shakila hepatis region is also seen. GALLBLADDER: Gallbladder is surgically absent. SPLEEN: Unremarkable. PANCREAS: Ill-defined spiculated pancreatic head/uncinate process region mass is again seen without interval change. This measures approximately 2.5 x 1.7 cm with associated mild atrophy of the pancreatic body/tail. No significant pancreatic ductal dilatation or peripancreatic fluid collection. Minimal surrounding hazy stranding in the head/uncinate process region could be related to tumor infiltration or mild pancreatitis. Recommend correlation with pancreatic enzyme levels. ADRENAL GLANDS: Unremarkable. KIDNEYS/URETERS: Kidneys are normal in size and position. Small exophytic cyst in the posterior lower pole of the right kidney is again seen. No nephrolithiasis, hydronephrosis, or perinephric fluid collections bilaterally.. STOMACH AND BOWEL: Stomach is contracted limiting evaluation. Subtle wall thickening of the duodenum may be due to underdistention or represent secondary duodenitis from adjacent pancreatic process. Remaining small and large bowel loops appear normal in course and caliber without obstruction or inflammation. Terminal ileum and appendix are unremarkable. APPENDIX: No findings to suggest appendicitis. ABDOMINOPELVIC CAVITY:  No pneumoperitoneum or pneumatosis intestinalis. Small amount of perihepatic/perisplenic and pelvic ascites noted. No loculated fluid collections. There are nonspecific mesenteric lymph nodes seen without significant lymphadenopathy by size criteria although evaluation is limited by lack of IV contrast. VESSELS: Atherosclerotic changes are present.  No evidence of aneurysm. PELVIS REPRODUCTIVE ORGANS: Unremarkable for patient's age. URINARY BLADDER: Contracted urinary bladder limiting evaluation. Evaluation is limited by streak artifact emanating from hip hardware. Multiple punctate pelvic phleboliths noted. ABDOMINAL WALL/INGUINAL REGIONS: Unremarkable. OSSEOUS STRUCTURES: No acute fracture or destructive osseous lesion. Multilevel degenerative changes of the thoracolumbar spine again seen. Bilateral hip prosthesis in normal anatomical alignment without apparent hardware complications.     Impression: 1.  Nonspecific stable 2 mm perifissural nodule in the right middle lobe. Additional nonspecific subpleural 3 mm nodule in the left upper lobe. Otherwise no definitive evidence of intrathoracic metastatic disease. 2.  Small hiatal hernia. 3.  Stable spiculated mass in the pancreatic head/uncinate process region with minimal surrounding hazy stranding is again seen as described above. This could be related to tumor infiltration or sequela of mild pancreatitis, recommend clinical correlation with pancreatic enzyme levels. 4.  Multiple ill-defined hypodense liver lesions again seen consistent with metastatic disease. 5.  Subtle wall thickening of the duodenum may be due to underdistention or represent secondary duodenitis from adjacent pancreatic process. Remaining small and large bowel loops appear normal in course and caliber without obstruction or inflammation. Normal terminal ileum, appendix, and large bowel. 6.  Small amount of perihepatic/perisplenic and pelvic ascites noted, new since the prior study. Workstation  "performed: [id number]     IR biopsy liver mass    Result Date: 1/5/2024  Narrative: CT-scan guided needle biopsy of one of the liver masses History: Pancreatic head mass and multiple liver masses panc vs liver ca Conscious sedation time: 60 minutes, 3 mg versed, 150 mcg fentanyl Technique: This examination, like all CT scans performed in the [facility], was performed utilizing techniques to minimize radiation dose exposure, including the use of iterative reconstruction and automated exposure control. The patient was brought to the CT scanner and placed supine on the table. After axial images were obtained through the region of interest of the right lobe of the liver, an area of the skin was then marked, prepped, and draped in usual sterile fashion. Lidocaine was administered to the skin and a small skin incision was made. A 17-gauge cannula was advanced up to the lesion, and through this, an 18-gauge core biopsy specimen was obtained. At the end of the procedure, Gelfoam was used for hemostasis through the cannula as it was removed. The patient tolerated the procedure well and suffered no complications.     Impression: Impression: Successful percutaneous core biopsy of one of the right lobe liver masses. A full pathology report will follow. Workstation performed: [id number]     VAS upper limb venous duplex scan, unilateral/limited    Result Date: 1/4/2024  Narrative:  [facility] REPORT CLINICAL: Indications:  Patient presents to rule of DVT of right upper extremity, reports \"lumps\" and \"itching\" near ACF. Operative History: No cardiovascular surgeries Risk Factors The patient has history of HTN, Diabetes, Hyperlipidemia and CAD.  FINDINGS:  Right                          Impression      Bas Mid Forearm                Not visualized  Basilic Vein - Forearm Distal  Not visualized     CONCLUSION:  Impression RIGHT UPPER LIMB: No evidence of acute or chronic deep vein thrombosis. No evidence " of superficial thrombophlebitis noted. Doppler evaluation shows a normal response to augmentation maneuvers. No gross changes to tissue echogenicity in patient area of concern.  LEFT UPPER LIMB LIMITED: Evaluation shows no evidence of thrombus in the internal jugular vein, subclavian vein, and the innominate vein.  SIGNATURE: Electronically Signed by: RODOLFO BRIDGES DO on 2024-01-04 04:31:13 PM    CT abdomen pelvis wo contrast    Result Date: 1/3/2024  Narrative: CT ABDOMEN AND PELVIS WITHOUT IV CONTRAST INDICATION:   Abdominal pain, acute, nonlocalized increasing abdominal pain. COMPARISON: CT scan from 12/26/2023. TECHNIQUE:  CT examination of the abdomen and pelvis was performed without intravenous contrast. Multiplanar 2D reformatted images were created from the source data. This examination, like all CT scans performed in the [facility], was performed utilizing techniques to minimize radiation dose exposure, including the use of iterative reconstruction and automated exposure control. Radiation dose length product (DLP) for this visit:  645.94 mGy-cm Enteric contrast was not administered. FINDINGS: ABDOMEN LOWER CHEST:  No clinically significant abnormality identified in the visualized lower chest. LIVER/BILIARY TREE: Multiple hypodense lesions are again seen consistent with metastatic disease the largest in the periphery of the right lobe measures 3.6 x 3.3 cm unchanged. GALLBLADDER: Removed. SPLEEN:  Unremarkable. PANCREAS: Pancreatic head mass measuring approximately 2.4 x 1.6 cm with atrophy of the pancreatic body and tail. IV contrast would be helpful in this setting. ADRENAL GLANDS:  Unremarkable. KIDNEYS/URETERS: Stable right lower pole cyst. No hydronephrosis. STOMACH AND BOWEL: There is colonic diverticulosis without evidence of acute diverticulitis. APPENDIX: A normal appendix was visualized. ABDOMINOPELVIC CAVITY: Hazy mesentery below the pancreatic mass suspicious for venous  engorgement and possible SMV tumor thrombus. VESSELS:  Unremarkable for patient's age. PELVIS REPRODUCTIVE ORGANS: Not well visualized due to beam hardening artifact from hip prostheses but grossly unremarkable. URINARY BLADDER:  Unremarkable. ABDOMINAL WALL/INGUINAL REGIONS:  Unremarkable. OSSEOUS STRUCTURES:  No acute fracture or destructive osseous lesion. Stable bilateral hip prostheses and spinal degenerative changes.     Impression: Pancreatic head mass measuring approximately 2.4 x 1.6 cm with atrophy of the pancreatic body and tail. IV contrast would be helpful in this setting to better assess for any vascular invasion. 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 Multiple liver lesions consistent with metastatic disease. Workstation performed: [id number]     CT abdomen pelvis wo contrast    Result Date: 12/27/2023  Narrative: CT ABDOMEN AND PELVIS WITHOUT IV CONTRAST INDICATION:   R sided abdominal pain, N/V. COMPARISON:  None. TECHNIQUE:  CT examination of the abdomen and pelvis was performed without intravenous contrast. Multiplanar 2D reformatted images were created from the source data. This examination, like all CT scans performed in the [facility], was performed utilizing techniques to minimize radiation dose exposure, including the use of iterative reconstruction and automated exposure control. Radiation dose length product (DLP) for this visit:  666.87 mGy-cm Enteric contrast was administered. FINDINGS: ABDOMEN LOWER CHEST:  No clinically significant abnormality identified in the visualized lower chest. LIVER/BILIARY TREE: Numerous ill-defined hypodense lesions throughout the liver measuring up to 3.4 cm in the peripheral right hepatic lobe. GALLBLADDER: Gallbladder is surgically absent. SPLEEN:  Unremarkable. PANCREAS: Lobulated enlargement of the pancreatic head with central hypodensity and surrounding inflammatory  change. Atrophic changes at the pancreatic body and tail. ADRENAL GLANDS:  Unremarkable. KIDNEYS/URETERS:  Unremarkable. No hydronephrosis. STOMACH AND BOWEL: There is colonic diverticulosis without evidence of acute diverticulitis. APPENDIX:  No findings to suggest appendicitis. ABDOMINOPELVIC CAVITY:  No ascites.  No pneumoperitoneum. Several mildly prominent peripancreatic lymph nodes. VESSELS: Enlarged pancreatic head encases and is inseparable from the celiac axis, SMV and SMA. PELVIS REPRODUCTIVE ORGANS:  Unremarkable for patient's age. URINARY BLADDER:  Unremarkable. ABDOMINAL WALL/INGUINAL REGIONS:  Unremarkable. OSSEOUS STRUCTURES:  No acute fracture or destructive osseous lesion. Bilateral arthroplasty     Impression: Suspected neoplasm at the pancreatic head with resulting atrophy of the pancreatic body and tail and hepatic metastasis. Alternatively findings may reflect acute pancreatitis. Contrast-enhanced abdominal MRI recommended for further evaluation. Workstation performed: [id number]     I spent 45 minutes on chart review, direct face to face counseling, coordination of care and documentation.

## 2024-01-14 NOTE — ASSESSMENT & PLAN NOTE
Patient initially presented with abdominal pain and constipation  Suspect that this is secondary to metastatic pancreatic adenocarcinoma with extensive necrosis and liver involvement.  Patient is status post GI evaluation-no plan for any acute phase interventional procedure  Formal heme-onc consultation is pending  Supportive therapy only  Patient was started on MiraLAX yesterday, she had 2 bowel movements, but she still feels constipated  Continue MiraLAX  Will advance diet as tolerated  Continue pain management  Tensional discharge planning for 24 to 48 hours

## 2024-01-14 NOTE — PLAN OF CARE
Problem: PAIN - ADULT  Goal: Verbalizes/displays adequate comfort level or baseline comfort level  Description: Interventions:  - Encourage patient to monitor pain and request assistance  - Assess pain using appropriate pain scale  - Administer analgesics based on type and severity of pain and evaluate response  - Implement non-pharmacological measures as appropriate and evaluate response  - Consider cultural and social influences on pain and pain management  - Notify physician/advanced practitioner if interventions unsuccessful or patient reports new pain  Outcome: Progressing     Problem: INFECTION - ADULT  Goal: Absence or prevention of progression during hospitalization  Description: INTERVENTIONS:  - Assess and monitor for signs and symptoms of infection  - Monitor lab/diagnostic results  - Monitor all insertion sites, i.e. indwelling lines, tubes, and drains  - Monitor endotracheal if appropriate and nasal secretions for changes in amount and color  - [place] appropriate cooling/warming therapies per order  - Administer medications as ordered  - Instruct and encourage patient and family to use good hand hygiene technique  - Identify and instruct in appropriate isolation precautions for identified infection/condition  Outcome: Progressing  Goal: Absence of fever/infection during neutropenic period  Description: INTERVENTIONS:  - Monitor WBC    Outcome: Progressing     Problem: SAFETY ADULT  Goal: Patient will remain free of falls  Description: INTERVENTIONS:  - Educate patient/family on patient safety including physical limitations  - Instruct patient to call for assistance with activity   - Consult OT/PT to assist with strengthening/mobility   - Keep Call bell within reach  - Keep bed low and locked with side rails adjusted as appropriate  - Keep care items and personal belongings within reach  - Initiate and maintain comfort rounds  - Make Fall Risk Sign visible to staff  - Offer Toileting every 2 Hours, in  advance of need  - Initiate/Maintain bed alarm  - Obtain necessary fall risk management equipment  - Apply yellow socks and bracelet for high fall risk patients  - Consider moving patient to room near nurses station  Outcome: Progressing  Goal: Maintain or return to baseline ADL function  Description: INTERVENTIONS:  -  Assess patient's ability to carry out ADLs; assess patient's baseline for ADL function and identify physical deficits which impact ability to perform ADLs (bathing, care of mouth/teeth, toileting, grooming, dressing, etc.)  - Assess/evaluate cause of self-care deficits   - Assess range of motion  - Assess patient's mobility; develop plan if impaired  - Assess patient's need for assistive devices and provide as appropriate  - Encourage maximum independence but intervene and supervise when necessary  - Involve family in performance of ADLs  - Assess for home care needs following discharge   - Consider OT consult to assist with ADL evaluation and planning for discharge  - Provide patient education as appropriate  Outcome: Progressing  Goal: Maintains/Returns to pre admission functional level  Description: INTERVENTIONS:  - Perform AM-PAC 6 Click Basic Mobility/ Daily Activity assessment daily.  - Set and communicate daily mobility goal to care team and patient/family/caregiver.   - Collaborate with rehabilitation services on mobility goals if consulted  - Perform Range of Motion 3 times a day.  - Reposition patient every 2 hours.  - Dangle patient 3 times a day  - Stand patient 3 times a day  - Ambulate patient 3 times a day  - Out of bed to chair 3 times a day   - Out of bed for meals 3 times a day  - Out of bed for toileting  - Record patient progress and toleration of activity level   Outcome: Progressing     Problem: DISCHARGE PLANNING  Goal: Discharge to home or other facility with appropriate resources  Description: INTERVENTIONS:  - Identify barriers to discharge w/patient and caregiver  -  Arrange for needed discharge resources and transportation as appropriate  - Identify discharge learning needs (meds, wound care, etc.)  - Arrange for interpretive services to assist at discharge as needed  - Refer to Case Management Department for coordinating discharge planning if the patient needs post-hospital services based on physician/advanced practitioner order or complex needs related to functional status, cognitive ability, or social support system  Outcome: Progressing     Problem: Knowledge Deficit  Goal: Patient/family/caregiver demonstrates understanding of disease process, treatment plan, medications, and discharge instructions  Description: Complete learning assessment and assess knowledge base.  Interventions:  - Provide teaching at level of understanding  - Provide teaching via preferred learning methods  Outcome: Progressing

## 2024-01-14 NOTE — PLAN OF CARE
Problem: INFECTION - ADULT  Goal: Absence or prevention of progression during hospitalization  Description: INTERVENTIONS:  - Assess and monitor for signs and symptoms of infection  - Monitor lab/diagnostic results  - Monitor all insertion sites, i.e. indwelling lines, tubes, and drains  - Monitor endotracheal if appropriate and nasal secretions for changes in amount and color  - [place] appropriate cooling/warming therapies per order  - Administer medications as ordered  - Instruct and encourage patient and family to use good hand hygiene technique  - Identify and instruct in appropriate isolation precautions for identified infection/condition  Outcome: Progressing     Problem: Prexisting or High Potential for Compromised Skin Integrity  Goal: Skin integrity is maintained or improved  Description: INTERVENTIONS:  - Identify patients at risk for skin breakdown  - Assess and monitor skin integrity  - Assess and monitor nutrition and hydration status  - Monitor labs   - Assess for incontinence   - Turn and reposition patient  - Assist with mobility/ambulation  - Relieve pressure over bony prominences  - Avoid friction and shearing  - Provide appropriate hygiene as needed including keeping skin clean and dry  - Evaluate need for skin moisturizer/barrier cream  - Collaborate with interdisciplinary team   - Patient/family teaching  - Consider wound care consult   Outcome: Progressing

## 2024-01-14 NOTE — PROGRESS NOTES
Progress Note- Candi Carpenter 83 y.o. 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     Suspect abdominal pain is multifactorial secondary to known pancreatic mass with hepatic metastasis, possible component of pancreatitis, and opioid induced constipation.  Recommend continued supportive care with the following:  -Agree with advancement of diet  -Pain control as needed, wean to least effective dose orally when able  -Antiemetics as needed  -Bowel regimen changed to MiraLAX twice daily +  Bisacodyl suppositories as needed.  She fortunately has had 2 bowel movements since yesterday, would continue regimen as is, can consider addition of stool softener if needed.     Regarding long-term management of recent diagnosis of pancreatic malignancy, with ongoing goals of care discussion.  If she is interested she can seek opinion with oncology versus very reasonable to pursue hospice given metastatic nature of disease and her advanced age.  GI will sign off, please call with questions  ______________________________________________________________________    Subjective:     No acute overnight events.  Patient did have 2 bowel movements which required significant straining and she noted residual abdominal pain after.  She feels she may be ready for advancement of diet although appetite is low.  Discussion of her care held by Dr. Butler, patient may be considering hospice/palliative approach to her care.    Medication Administration - last 24 hours from 01/13/2024 1338 to 01/14/2024 1338         Date/Time Order Dose Route Action Action by     01/14/2024 1024 EST docusate sodium (COLACE) capsule 100 mg 100 mg Oral Not Given Elis Montero RN     01/13/2024 1750 EST docusate sodium (COLACE)  "capsule 100 mg 100 mg Oral Given Yola Purdy, RADHA     01/13/2024 2115 EST sodium chloride 0.9 % infusion 100 mL/hr Intravenous New Bag Es Williamson RN     01/14/2024 1019 EST amLODIPine (NORVASC) tablet 5 mg 5 mg Oral Given Elis Montero RN     01/14/2024 1019 EST ascorbic acid (VITAMIN C) tablet 500 mg 500 mg Oral Given Elis Montero RN     01/14/2024 0757 EST oxyCODONE (ROXICODONE) immediate release tablet 10 mg 10 mg Oral Given Elis Montero RN     01/14/2024 1019 EST HYDROmorphone (DILAUDID) injection 0.5 mg 0.5 mg Intravenous Given Elis Montero RN     01/14/2024 1019 EST apixaban (ELIQUIS) tablet 5 mg 5 mg Oral Given Elis Montero RN     01/13/2024 1750 EST apixaban (ELIQUIS) tablet 5 mg 5 mg Oral Given Yola Purdy RN     01/14/2024 1024 EST polyethylene glycol (MIRALAX) packet 17 g 17 g Oral Not Given Elis Montero RN     01/13/2024 2041 EST polyethylene glycol (MIRALAX) packet 17 g 17 g Oral Given Es Williamson RN     01/14/2024 1204 EST insulin lispro (HumaLOG) 100 units/mL subcutaneous injection 1-6 Units 1 Units Subcutaneous Given Elis Montero RN     01/14/2024 0757 EST insulin lispro (HumaLOG) 100 units/mL subcutaneous injection 1-6 Units 2 Units Subcutaneous Given Elis Montero RN     01/13/2024 1658 EST insulin lispro (HumaLOG) 100 units/mL subcutaneous injection 1-6 Units 2 Units Subcutaneous Given Yola Purdy RN            Objective:     Vitals: Blood pressure 115/67, pulse 101, temperature 98.2 °F (36.8 °C), temperature source Axillary, resp. rate 16, height 4' 11\" (1.499 m), weight 85.2 kg (187 lb 13.3 oz), SpO2 92%, not currently breastfeeding.,Body mass index is 37.94 kg/m².      Intake/Output Summary (Last 24 hours) at 1/14/2024 1338  Last data filed at 1/13/2024 2115  Gross per 24 hour   Intake 1500 ml   Output --   Net 1500 ml       Physical Exam:   General Appearance: Awake and alert, in no acute distress  Abdomen: Soft, non-tender, non-distended; bowel sounds normal; no masses or no " organomegaly    Invasive Devices       Peripheral Intravenous Line  Duration             Peripheral IV 01/12/24 Left Antecubital 1 day                    Lab Results:  Admission on 01/12/2024   Component Date Value    WBC 01/12/2024 6.36     RBC 01/12/2024 4.29     Hemoglobin 01/12/2024 12.5     Hematocrit 01/12/2024 38.9     MCV 01/12/2024 91     MCH 01/12/2024 29.1     MCHC 01/12/2024 32.1     RDW 01/12/2024 12.6     MPV 01/12/2024 10.6     Platelets 01/12/2024 223     nRBC 01/12/2024 0     Neutrophils Relative 01/12/2024 65     Immat GRANS % 01/12/2024 0     Lymphocytes Relative 01/12/2024 22     Monocytes Relative 01/12/2024 10     Eosinophils Relative 01/12/2024 2     Basophils Relative 01/12/2024 1     Neutrophils Absolute 01/12/2024 4.15     Immature Grans Absolute 01/12/2024 0.02     Lymphocytes Absolute 01/12/2024 1.41     Monocytes Absolute 01/12/2024 0.64     Eosinophils Absolute 01/12/2024 0.11     Basophils Absolute 01/12/2024 0.03     Sodium 01/12/2024 132 (L)     Potassium 01/12/2024 4.0     Chloride 01/12/2024 94 (L)     CO2 01/12/2024 25     ANION GAP 01/12/2024 13     BUN 01/12/2024 9     Creatinine 01/12/2024 0.84     Glucose 01/12/2024 308 (H)     Calcium 01/12/2024 9.0     AST 01/12/2024 23     ALT 01/12/2024 12     Alkaline Phosphatase 01/12/2024 126 (H)     Total Protein 01/12/2024 6.8     Albumin 01/12/2024 3.7     Total Bilirubin 01/12/2024 0.55     eGFR 01/12/2024 65     Lipase 01/12/2024 590 (H)     hs TnI 0hr 01/12/2024 13     Ventricular Rate 01/12/2024 79     Atrial Rate 01/12/2024 79     TX Interval 01/12/2024 142     QRSD Interval 01/12/2024 94     QT Interval 01/12/2024 374     QTC Interval 01/12/2024 428     P Axis 01/12/2024 62     QRS Axis 01/12/2024 -17     T Wave Axis 01/12/2024 52     hs TnI 2hr 01/13/2024 15     Delta 2hr hsTnI 01/13/2024 2     hs TnI 4hr 01/13/2024 14     Delta 4hr hsTnI 01/13/2024 1     POC Glucose 01/13/2024 269 (H)     Sodium 01/13/2024 133 (L)      Potassium 01/13/2024 4.3     Chloride 01/13/2024 99     CO2 01/13/2024 27     ANION GAP 01/13/2024 7     BUN 01/13/2024 8     Creatinine 01/13/2024 0.71     Glucose 01/13/2024 268 (H)     Calcium 01/13/2024 8.3 (L)     eGFR 01/13/2024 80     WBC 01/13/2024 6.26     RBC 01/13/2024 3.67 (L)     Hemoglobin 01/13/2024 10.8 (L)     Hematocrit 01/13/2024 33.8 (L)     MCV 01/13/2024 92     MCH 01/13/2024 29.4     MCHC 01/13/2024 32.0     RDW 01/13/2024 12.7     Platelets 01/13/2024 175     MPV 01/13/2024 11.6     POC Glucose 01/13/2024 225 (H)     POC Glucose 01/13/2024 183 (H)     POC Glucose 01/13/2024 200 (H)     POC Glucose 01/13/2024 158 (H)     POC Glucose 01/14/2024 178 (H)     POC Glucose 01/14/2024 197 (H)     POC Glucose 01/14/2024 182 (H)        Imaging Studies: I have personally reviewed pertinent imaging studies.   No acute overnight events.

## 2024-01-14 NOTE — PROGRESS NOTES
[facility]  Progress Note  Name: Candi Carpenter I  MRN: [id number]  Unit/Bed#: -01 I Date of Admission: 1/12/2024   Date of Service: 1/14/2024 I Hospital Day: 1    Assessment/Plan   * Pancreatic adenocarcinoma with liver mets (HCC)  Assessment & Plan  Patient initially presented with abdominal pain and constipation  Suspect that this is secondary to metastatic pancreatic adenocarcinoma with extensive necrosis and liver involvement.  Patient is status post GI evaluation-no plan for any acute phase interventional procedure  Formal heme-onc consultation is pending  Supportive therapy only  Patient was started on MiraLAX yesterday, she had 2 bowel movements, but she still feels constipated  Continue MiraLAX  Will advance diet as tolerated  Continue pain management  Tensional discharge planning for 24 to 48 hours    Goals of care, counseling/discussion  Assessment & Plan  I had a long goals of care, counseling/discussion session with the patient, and her son yesterday.  Please refer to my ACP note and addendum to the H&P for additional details.  Patient is leaning towards hospice, she does not want any chemotherapy of any type.  The patient's son Andre, is bedside, and would like to meet with hematology oncology before finalizing a decision regarding transition to hospice.  Patient was scheduled for an outpatient heme-onc evaluation tomorrow on 1/15/2024 at 9 AM with Dr. Burt, however because she is hospitalized, she will be unable to make that appointment.  Will consult heme-onc today so that they can evaluate her.  Depending on what the hematology oncology evaluation reveals, and what the patient and her son decide, there is the likelihood that the patient will move forward with hospice.  Patient was seen by hospice on this admission already.    Type 2 diabetes mellitus with stage 3 chronic kidney disease, without long-term current use of insulin, unspecified whether stage 3a or 3b CKD  (HCC)  Assessment & Plan  Lab Results   Component Value Date    HGBA1C 11.6 (H) 10/17/2023       Recent Labs     01/13/24  0136   POCGLU 269*       Blood Sugar Average: Last 72 hrs:  (P) 269  Add Lantus at night  Continue Accu-Cheks before meals and at bedtime with sliding scale coverage otherwise  Target blood sugar for the hospital is 140-190      Coronary artery disease involving native coronary artery of native heart without angina pectoris  Assessment & Plan  Continue apixaban  Continue supportive therapy otherwise    Elevated lipase  Assessment & Plan  Suspect secondary to pancreatic mass with liver mets  No need to recheck    Superior mesenteric vein thrombosis (HCC)  Assessment & Plan  Continue Eliquis 5mg BID    Hyponatremia  Assessment & Plan  Hypovolemic hyponatremia  Recheck in the a.m.               VTE Pharmacologic Prophylaxis: VTE Score: 6 High Risk (Score >/= 5) - Pharmacological DVT Prophylaxis Ordered: apixaban (Eliquis). Sequential Compression Devices Ordered.    Mobility:   Basic Mobility Inpatient Raw Score: 17  JH-HLM Goal: 5: Stand one or more mins  JH-HLM Achieved: 4: Move to chair/commode  HLM Goal achieved. Continue to encourage appropriate mobility.    Patient Centered Rounds: I performed bedside rounds with nursing staff today.   Discussions with Specialists or Other Care Team Provider: GI, hematology oncology, case management, nursing    Education and Discussions with Family / Patient: Updated  (son) at bedside.    Total Time Spent on Date of Encounter in care of patient: 45 mins. This time was spent on one or more of the following: performing physical exam; counseling and coordination of care; obtaining or reviewing history; documenting in the medical record; reviewing/ordering tests, medications or procedures; communicating with other healthcare professionals and discussing with patient's family/caregivers.    Current Length of Stay: 1 day(s)  Current Patient Status:  Inpatient   Certification Statement: The patient will continue to require additional inpatient hospital stay due to continued management for abdominal pain  Discharge Plan: Anticipate discharge in 24-48 hrs to home.    Code Status: Level 3 - DNAR and DNI    Subjective:   Patient seen, resting in bed, continues to complain of some ongoing abdominal discomfort.  She reports it is a little bit better since prior to coming into the hospital but she is not back to baseline    Objective:     Vitals:   Temp (24hrs), Av.3 °F (36.8 °C), Min:98.2 °F (36.8 °C), Max:98.5 °F (36.9 °C)    Temp:  [98.2 °F (36.8 °C)-98.5 °F (36.9 °C)] 98.2 °F (36.8 °C)  HR:  [] 101  Resp:  [16-18] 16  BP: (115-141)/(64-74) 115/67  SpO2:  [93 %-96 %] 94 %  Body mass index is 37.94 kg/m².     Input and Output Summary (last 24 hours):     Intake/Output Summary (Last 24 hours) at 2024 1032  Last data filed at 2024 2115  Gross per 24 hour   Intake 1860 ml   Output --   Net 1860 ml       Physical Exam:   Physical Exam  Constitutional:       General: She is not in acute distress.     Appearance: Normal appearance. She is normal weight. She is not ill-appearing.   HENT:      Head: Normocephalic and atraumatic.      Nose: Nose normal.      Mouth/Throat:      Mouth: Mucous membranes are moist.   Eyes:      Extraocular Movements: Extraocular movements intact.      Pupils: Pupils are equal, round, and reactive to light.   Cardiovascular:      Rate and Rhythm: Normal rate and regular rhythm.      Pulses: Normal pulses.      Heart sounds: Normal heart sounds. No murmur heard.     No friction rub. No gallop.   Pulmonary:      Effort: Pulmonary effort is normal. No respiratory distress.      Breath sounds: Normal breath sounds. No wheezing, rhonchi or rales.   Abdominal:      General: There is no distension.      Palpations: Abdomen is soft. There is no mass.      Tenderness: There is no abdominal tenderness.      Hernia: No hernia is present.    Musculoskeletal:         General: No swelling or tenderness. Normal range of motion.      Cervical back: Normal range of motion and neck supple. No rigidity.      Right lower leg: No edema.      Left lower leg: No edema.   Skin:     General: Skin is warm.      Capillary Refill: Capillary refill takes less than 2 seconds.      Findings: No erythema or rash.   Neurological:      General: No focal deficit present.      Mental Status: She is alert and oriented to person, place, and time. Mental status is at baseline.      Cranial Nerves: No cranial nerve deficit.      Motor: No weakness.   Psychiatric:         Mood and Affect: Mood normal.         Behavior: Behavior normal.          Additional Data:     Labs:  Results from last 7 days   Lab Units 01/13/24  0422 01/12/24  2307   WBC Thousand/uL 6.26 6.36   HEMOGLOBIN g/dL 10.8* 12.5   HEMATOCRIT % 33.8* 38.9   PLATELETS Thousands/uL 175 223   NEUTROS PCT %  --  65   LYMPHS PCT %  --  22   MONOS PCT %  --  10   EOS PCT %  --  2     Results from last 7 days   Lab Units 01/13/24  0422 01/12/24  2307   SODIUM mmol/L 133* 132*   POTASSIUM mmol/L 4.3 4.0   CHLORIDE mmol/L 99 94*   CO2 mmol/L 27 25   BUN mg/dL 8 9   CREATININE mg/dL 0.71 0.84   ANION GAP mmol/L 7 13   CALCIUM mg/dL 8.3* 9.0   ALBUMIN g/dL  --  3.7   TOTAL BILIRUBIN mg/dL  --  0.55   ALK PHOS U/L  --  126*   ALT U/L  --  12   AST U/L  --  23   GLUCOSE RANDOM mg/dL 268* 308*         Results from last 7 days   Lab Units 01/14/24  0721 01/14/24  0112 01/13/24  2037 01/13/24  1658 01/13/24  1158 01/13/24  0544 01/13/24  0136   POC GLUCOSE mg/dl 197* 178* 158* 200* 183* 225* 269*               Lines/Drains:  Invasive Devices       Peripheral Intravenous Line  Duration             Peripheral IV 01/12/24 Left Antecubital 1 day                          Imaging: Reviewed radiology reports from this admission including: abdominal/pelvic CT    Recent Cultures (last 7 days):         Last 24 Hours Medication List:    Current Facility-Administered Medications   Medication Dose Route Frequency Provider Last Rate    amLODIPine  5 mg Oral Daily Ro Perkins PA-C      apixaban  5 mg Oral BID Ro Perkins PA-C      ascorbic acid  500 mg Oral Daily Ro Perkins PA-C      bisacodyl  10 mg Rectal Daily PRN Risa Wyman DO      docusate sodium  100 mg Oral BID Ro Perkins PA-C      HYDROmorphone  0.5 mg Intravenous Q4H PRN Ro Perkins PA-C      insulin glargine  15 Units Subcutaneous HS Zoe Butler MD      insulin lispro  1-6 Units Subcutaneous TID With Meals Zoe Butler MD      ondansetron  4 mg Intravenous Q6H PRN Ro Perkins PA-C      oxyCODONE  10 mg Oral Q6H PRN Ro Perkins PA-C      oxyCODONE  2.5 mg Oral Q6H PRN Ro Perkins PA-C      polyethylene glycol  17 g Oral BID Risa Wyman DO          Today, Patient Was Seen By: Zoe Butler MD    **Please Note: This note may have been constructed using a voice recognition system.**

## 2024-01-14 NOTE — ASSESSMENT & PLAN NOTE
I had a long goals of care, counseling/discussion session with the patient, and her son yesterday.  Please refer to my ACP note and addendum to the H&P for additional details.  Patient is leaning towards hospice, she does not want any chemotherapy of any type.  The patient's son Andre, is bedside, and would like to meet with hematology oncology before finalizing a decision regarding transition to hospice.  Patient was scheduled for an outpatient heme-onc evaluation tomorrow on 1/15/2024 at 9 AM with Dr. Burt, however because she is hospitalized, she will be unable to make that appointment.  Will consult heme-onc today so that they can evaluate her.  Depending on what the hematology oncology evaluation reveals, and what the patient and her son decide, there is the likelihood that the patient will move forward with hospice.  Patient was seen by hospice on this admission already.

## 2024-01-15 ENCOUNTER — TELEPHONE (OUTPATIENT)
Dept: HEMATOLOGY ONCOLOGY | Facility: CLINIC | Age: 84
End: 2024-01-15

## 2024-01-15 ENCOUNTER — RA CDI HCC (OUTPATIENT)
Dept: OTHER | Facility: HOSPITAL | Age: 84
End: 2024-01-15

## 2024-01-15 ENCOUNTER — HOME CARE VISIT (OUTPATIENT)
Dept: HOME HEALTH SERVICES | Facility: HOME HEALTHCARE | Age: 84
End: 2024-01-15
Payer: COMMERCIAL

## 2024-01-15 LAB
ANION GAP SERPL CALCULATED.3IONS-SCNC: 8 MMOL/L
BASOPHILS # BLD AUTO: 0.03 THOUSANDS/ÂΜL (ref 0–0.1)
BASOPHILS NFR BLD AUTO: 1 % (ref 0–1)
BUN SERPL-MCNC: 5 MG/DL (ref 5–25)
CALCIUM SERPL-MCNC: 7.8 MG/DL (ref 8.4–10.2)
CHLORIDE SERPL-SCNC: 104 MMOL/L (ref 96–108)
CO2 SERPL-SCNC: 26 MMOL/L (ref 21–32)
CREAT SERPL-MCNC: 0.64 MG/DL (ref 0.6–1.3)
EOSINOPHIL # BLD AUTO: 0.24 THOUSAND/ÂΜL (ref 0–0.61)
EOSINOPHIL NFR BLD AUTO: 4 % (ref 0–6)
ERYTHROCYTE [DISTWIDTH] IN BLOOD BY AUTOMATED COUNT: 12.8 % (ref 11.6–15.1)
GFR SERPL CREATININE-BSD FRML MDRD: 83 ML/MIN/1.73SQ M
GLUCOSE SERPL-MCNC: 100 MG/DL (ref 65–140)
GLUCOSE SERPL-MCNC: 124 MG/DL (ref 65–140)
GLUCOSE SERPL-MCNC: 144 MG/DL (ref 65–140)
GLUCOSE SERPL-MCNC: 149 MG/DL (ref 65–140)
GLUCOSE SERPL-MCNC: 171 MG/DL (ref 65–140)
HCT VFR BLD AUTO: 34.1 % (ref 34.8–46.1)
HGB BLD-MCNC: 10.8 G/DL (ref 11.5–15.4)
IMM GRANULOCYTES # BLD AUTO: 0.01 THOUSAND/UL (ref 0–0.2)
IMM GRANULOCYTES NFR BLD AUTO: 0 % (ref 0–2)
LYMPHOCYTES # BLD AUTO: 2 THOUSANDS/ÂΜL (ref 0.6–4.47)
LYMPHOCYTES NFR BLD AUTO: 32 % (ref 14–44)
MCH RBC QN AUTO: 29.5 PG (ref 26.8–34.3)
MCHC RBC AUTO-ENTMCNC: 31.7 G/DL (ref 31.4–37.4)
MCV RBC AUTO: 93 FL (ref 82–98)
MONOCYTES # BLD AUTO: 0.75 THOUSAND/ÂΜL (ref 0.17–1.22)
MONOCYTES NFR BLD AUTO: 12 % (ref 4–12)
NEUTROPHILS # BLD AUTO: 3.17 THOUSANDS/ÂΜL (ref 1.85–7.62)
NEUTS SEG NFR BLD AUTO: 51 % (ref 43–75)
NRBC BLD AUTO-RTO: 0 /100 WBCS
PLATELET # BLD AUTO: 192 THOUSANDS/UL (ref 149–390)
PMV BLD AUTO: 11.3 FL (ref 8.9–12.7)
POTASSIUM SERPL-SCNC: 3.5 MMOL/L (ref 3.5–5.3)
RBC # BLD AUTO: 3.66 MILLION/UL (ref 3.81–5.12)
SODIUM SERPL-SCNC: 138 MMOL/L (ref 135–147)
WBC # BLD AUTO: 6.2 THOUSAND/UL (ref 4.31–10.16)

## 2024-01-15 PROCEDURE — 82948 REAGENT STRIP/BLOOD GLUCOSE: CPT

## 2024-01-15 PROCEDURE — 80048 BASIC METABOLIC PNL TOTAL CA: CPT | Performed by: HOSPITALIST

## 2024-01-15 PROCEDURE — 99232 SBSQ HOSP IP/OBS MODERATE 35: CPT | Performed by: INTERNAL MEDICINE

## 2024-01-15 PROCEDURE — 85025 COMPLETE CBC W/AUTO DIFF WBC: CPT | Performed by: HOSPITALIST

## 2024-01-15 RX ADMIN — AMLODIPINE BESYLATE 5 MG: 5 TABLET ORAL at 09:06

## 2024-01-15 RX ADMIN — APIXABAN 5 MG: 5 TABLET, FILM COATED ORAL at 09:07

## 2024-01-15 RX ADMIN — APIXABAN 5 MG: 5 TABLET, FILM COATED ORAL at 18:04

## 2024-01-15 RX ADMIN — HYDROMORPHONE HYDROCHLORIDE 0.5 MG: 1 INJECTION, SOLUTION INTRAMUSCULAR; INTRAVENOUS; SUBCUTANEOUS at 22:17

## 2024-01-15 RX ADMIN — OXYCODONE HYDROCHLORIDE AND ACETAMINOPHEN 500 MG: 500 TABLET ORAL at 09:07

## 2024-01-15 RX ADMIN — OXYCODONE HYDROCHLORIDE 10 MG: 10 TABLET ORAL at 00:16

## 2024-01-15 RX ADMIN — INSULIN GLARGINE 15 UNITS: 100 INJECTION, SOLUTION SUBCUTANEOUS at 22:19

## 2024-01-15 RX ADMIN — INSULIN LISPRO 1 UNITS: 100 INJECTION, SOLUTION INTRAVENOUS; SUBCUTANEOUS at 18:05

## 2024-01-15 RX ADMIN — ONDANSETRON 4 MG: 2 INJECTION INTRAMUSCULAR; INTRAVENOUS at 12:21

## 2024-01-15 RX ADMIN — OXYCODONE HYDROCHLORIDE 10 MG: 10 TABLET ORAL at 20:48

## 2024-01-15 NOTE — CASE MANAGEMENT
Case Management Assessment & Discharge Planning Note    Patient name Candi Carpenter  Location /-01 MRN [id number]  : 1940 Date 1/15/2024       Current Admission Date: 2024  Current Admission Diagnosis:Pancreatic adenocarcinoma with liver mets (HCC)   Patient Active Problem List    Diagnosis Date Noted    Goals of care, counseling/discussion 2024    Pancreatic adenocarcinoma with liver mets (HCC) 2024    Superior mesenteric vein thrombosis (HCC) 2024    Elevated lipase 2024    Abdominal pain 2024    Hyponatremia 2024    Callus 2023    Onychomycosis of toenail 2023    Trachyonychia 2023    Eczema 2023    Stage 3 chronic kidney disease, unspecified whether stage 3a or 3b CKD (HCC) 2023    Pruritic dermatitis 2023    Vaccination not carried out because of patient refusal 2021    Cotton wool exudates 06/15/2021    Coronary artery disease involving native coronary artery of native heart without angina pectoris 2020    Chronic right-sided low back pain with right-sided sciatica 2020    Type 2 diabetes mellitus with stage 3 chronic kidney disease, without long-term current use of insulin, unspecified whether stage 3a or 3b CKD (HCC)     Hyperlipidemia 2013    Allergic rhinitis 2013    Hypertension 2012    Esophageal reflux 2012      LOS (days): 2  Geometric Mean LOS (GMLOS) (days):   Days to GMLOS:     OBJECTIVE:  PATIENT READMITTED TO HOSPITAL  Risk of Unplanned Readmission Score: 18.79     Current admission status: Inpatient    Preferred Pharmacy:   [facility] - [address] [address]  Phone: [phone] Fax: [phone]    Primary Care Provider: Ming Paredes MD    [facility]  Secondary Insurance:     ASSESSMENT:  Active Health Care Proxies       [facility]  Representative - Child   Primary Phone: [phone] (Home)                 Housing Stability: Low Risk  (1/13/2024)    Housing Stability Vital Sign     Unable to Pay for Housing in the Last Year: No     Number of Places Lived in the Last Year: 1     Unstable Housing in the Last Year: No   Food Insecurity: No Food Insecurity (1/13/2024)    Hunger Vital Sign     Worried About Running Out of Food in the Last Year: Never true     Ran Out of Food in the Last Year: Never true   Transportation Needs: No Transportation Needs (1/13/2024)    PRAPARE - Transportation     Lack of Transportation (Medical): No     Lack of Transportation (Non-Medical): No   Utilities: Not At Risk (1/13/2024)    [facility] Utilities     Threatened with loss of utilities: No       DISCHARGE DETAILS:  Pt reports she is going home to her sons home in [place] with hospice.  TC prashant pt son Cal Narvaez who reports he is awaiting a call from hospice about the DME.  TT to Misty GARCIA to notify of the address in [place] the pt will be going to.  AS per Hospice the DME will be delivered in the am.  Son will transport to his home as he is afraid of the weather.   Pt made aware of same.  SLIM also aware.

## 2024-01-15 NOTE — PLAN OF CARE
Problem: PAIN - ADULT  Goal: Verbalizes/displays adequate comfort level or baseline comfort level  Description: Interventions:  - Encourage patient to monitor pain and request assistance  - Assess pain using appropriate pain scale  - Administer analgesics based on type and severity of pain and evaluate response  - Implement non-pharmacological measures as appropriate and evaluate response  - Consider cultural and social influences on pain and pain management  - Notify physician/advanced practitioner if interventions unsuccessful or patient reports new pain  Outcome: Progressing     Problem: INFECTION - ADULT  Goal: Absence or prevention of progression during hospitalization  Description: INTERVENTIONS:  - Assess and monitor for signs and symptoms of infection  - Monitor lab/diagnostic results  - Monitor all insertion sites, i.e. indwelling lines, tubes, and drains  - Monitor endotracheal if appropriate and nasal secretions for changes in amount and color  - [place] appropriate cooling/warming therapies per order  - Administer medications as ordered  - Instruct and encourage patient and family to use good hand hygiene technique  - Identify and instruct in appropriate isolation precautions for identified infection/condition  Outcome: Progressing  Goal: Absence of fever/infection during neutropenic period  Description: INTERVENTIONS:  - Monitor WBC    Outcome: Progressing     Problem: SAFETY ADULT  Goal: Patient will remain free of falls  Description: INTERVENTIONS:  - Educate patient/family on patient safety including physical limitations  - Instruct patient to call for assistance with activity   - Consult OT/PT to assist with strengthening/mobility   - Keep Call bell within reach  - Keep bed low and locked with side rails adjusted as appropriate  - Keep care items and personal belongings within reach  - Initiate and maintain comfort rounds  - Make Fall Risk Sign visible to staff  - Offer Toileting every 2 Hours, in  advance of need  - Initiate/Maintain bed alarm  - Obtain necessary fall risk management equipment  - Apply yellow socks and bracelet for high fall risk patients  - Consider moving patient to room near nurses station  Outcome: Progressing  Goal: Maintain or return to baseline ADL function  Description: INTERVENTIONS:  -  Assess patient's ability to carry out ADLs; assess patient's baseline for ADL function and identify physical deficits which impact ability to perform ADLs (bathing, care of mouth/teeth, toileting, grooming, dressing, etc.)  - Assess/evaluate cause of self-care deficits   - Assess range of motion  - Assess patient's mobility; develop plan if impaired  - Assess patient's need for assistive devices and provide as appropriate  - Encourage maximum independence but intervene and supervise when necessary  - Involve family in performance of ADLs  - Assess for home care needs following discharge   - Consider OT consult to assist with ADL evaluation and planning for discharge  - Provide patient education as appropriate  Outcome: Progressing  Goal: Maintains/Returns to pre admission functional level  Description: INTERVENTIONS:  - Perform AM-PAC 6 Click Basic Mobility/ Daily Activity assessment daily.  - Set and communicate daily mobility goal to care team and patient/family/caregiver.   - Collaborate with rehabilitation services on mobility goals if consulted  - Perform Range of Motion 3 times a day.  - Reposition patient every 2 hours.  - Dangle patient 3 times a day  - Stand patient 3 times a day  - Ambulate patient 3 times a day  - Out of bed to chair 3 times a day   - Out of bed for meals 3 times a day  - Out of bed for toileting  - Record patient progress and toleration of activity level   Outcome: Progressing     Problem: DISCHARGE PLANNING  Goal: Discharge to home or other facility with appropriate resources  Description: INTERVENTIONS:  - Identify barriers to discharge w/patient and caregiver  -  Arrange for needed discharge resources and transportation as appropriate  - Identify discharge learning needs (meds, wound care, etc.)  - Arrange for interpretive services to assist at discharge as needed  - Refer to Case Management Department for coordinating discharge planning if the patient needs post-hospital services based on physician/advanced practitioner order or complex needs related to functional status, cognitive ability, or social support system  Outcome: Progressing     Problem: Knowledge Deficit  Goal: Patient/family/caregiver demonstrates understanding of disease process, treatment plan, medications, and discharge instructions  Description: Complete learning assessment and assess knowledge base.  Interventions:  - Provide teaching at level of understanding  - Provide teaching via preferred learning methods  Outcome: Progressing     Problem: Prexisting or High Potential for Compromised Skin Integrity  Goal: Skin integrity is maintained or improved  Description: INTERVENTIONS:  - Identify patients at risk for skin breakdown  - Assess and monitor skin integrity  - Assess and monitor nutrition and hydration status  - Monitor labs   - Assess for incontinence   - Turn and reposition patient  - Assist with mobility/ambulation  - Relieve pressure over bony prominences  - Avoid friction and shearing  - Provide appropriate hygiene as needed including keeping skin clean and dry  - Evaluate need for skin moisturizer/barrier cream  - Collaborate with interdisciplinary team   - Patient/family teaching  - Consider wound care consult   Outcome: Progressing

## 2024-01-15 NOTE — NUTRITION
"   01/15/24 1343   Biochemical Data,Medical Tests, and Procedures   Biochemical Data/Medical Tests/Procedures Lab values reviewed;Meds reviewed   Labs (Comment) 1/15/2024 glucose 144, hemoglobin 10.8, hematocrit 34.1, calcium 7.8   Meds (Comment) Eliquis, vitamin C, Colace, insulin, MiraLAX, Dilaudid, Zofran   Nutrition-Focused Physical Exam   Nutrition-Focused Physical Exam Findings RN skin assessment reviewed;Edema;No skin issues documented  (Trace bilateral lower extremity edema)   Medical-Related Concerns pancreatic adenocarcinoma with liver mets, hypertension, type 2 diabetes, hyperlipidemia, obesity, stage III CKD, eczema   Current PO Intake   Current Diet Order Surgical soft diet, thin liquids   Current Meal Intake 0-25%   Estimated calorie intake compared to estimated need Nutrient needs are not met   Recommendations/Interventions   Malnutrition/BMI Present No  (1 criteria met-inadequate oral intake)   Summary Weight loss, poor p.o.-MST 3.  Presents with abdominal pain and constipation with increase in lower extremity edema. Past medical history significant for pancreatic adenocarcinoma with liver mets, hypertension, type 2 diabetes, hyperlipidemia, obesity, stage III CKD, eczema.  Weight history reviewed.  Fluid status likely impacting trend.  Will monitor.  Trace bilateral lower extremity edema.  No pressure areas.  Prescribed a surgical soft diet, thin liquids.  Meal completion 0-25% this admission.  Hospice consulted.  Met with patient at bedside.  She reports her appetite is \"not too barron.\"  Reports \"unable to keep much down\" since a week or 2 before [place].  Reports unintentional weight loss during this time.  Usually has 2 meals daily, breakfast and dinner.  Reports consuming pineapple leads to tongue swelling.  Patient cooks and grocery shops.  Reports difficulty swallowing bread, avoids.  Discussed diet as prescribed.  She is agreeable to Ensure chocolate.  Will monitor tolerance at follow-up. "   Interventions/Recommendations Continue current diet order;Supplement initiate;Monitor I & O's;Initiate daily weight   Education Assessment   Education Patient/caregiver not appropriate for education at this time   Patient Nutrition Goals   Goal Increase kcal/PRO intake

## 2024-01-15 NOTE — PROGRESS NOTES
[facility]  Progress Note  Name: Candi Carpenter I  MRN: [id number]  Unit/Bed#: [facility] I Date of Admission: 1/12/2024   Date of Service: 1/15/2024 I Hospital Day: 2    Assessment/Plan   * Pancreatic adenocarcinoma with liver mets (HCC)  Assessment & Plan  Patient initially presented with abdominal pain and constipation  Suspect that this is secondary to metastatic pancreatic adenocarcinoma with extensive necrosis and liver involvement.  Patient is status post GI evaluation-no plan for any acute phase interventional procedure  Supportive therapy only  Continue MiraLAX  Will advance diet as tolerated  Continue pain management  Discharge planning for 24 to 48 hours    Goals of care, counseling/discussion  Assessment & Plan  Please see previous discussions regarding goals of care.  Patient's family and patient spoke with oncology today.  Decision has been made to proceed with hospice.  Case management/hospice team working on discharge planning    Superior mesenteric vein thrombosis (HCC)  Assessment & Plan  Continue Eliquis 5mg BID    Hyponatremia  Assessment & Plan  Hypovolemic hyponatremia    Elevated lipase  Assessment & Plan  Suspect secondary to pancreatic mass with liver mets  No need to recheck    Coronary artery disease involving native coronary artery of native heart without angina pectoris  Assessment & Plan  Continue apixaban  Continue supportive therapy otherwise    Type 2 diabetes mellitus with stage 3 chronic kidney disease, without long-term current use of insulin, unspecified whether stage 3a or 3b CKD (HCC)  Assessment & Plan  Lab Results   Component Value Date    HGBA1C 11.6 (H) 10/17/2023       Recent Labs     01/14/24  1606 01/14/24  2154 01/15/24  0702 01/15/24  1120   POCGLU 191* 204* 100 144*         Blood Sugar Average: Last 72 hrs:  (P) 185.7008004345868107  Add Lantus at night  Continue Accu-Cheks before meals and at bedtime with sliding scale coverage  otherwise  Target blood sugar for the hospital is 140-190               VTE Pharmacologic Prophylaxis: VTE Score: 6 Moderate Risk (Score 3-4) - Pharmacological DVT Prophylaxis Ordered: apixaban (Eliquis).    Mobility:   Basic Mobility Inpatient Raw Score: 17  JH-HLM Goal: 5: Stand one or more mins  JH-HLM Achieved: 2: Bed activities/Dependent transfer  HLM Goal achieved. Continue to encourage appropriate mobility.    Patient Centered Rounds: I performed bedside rounds with nursing staff today.   Discussions with Specialists or Other Care Team Provider: yes    Education and Discussions with Family / Patient: Updated  (son) via phone.    Current Length of Stay: 2 day(s)  Current Patient Status: Inpatient   Certification Statement: The patient will continue to require additional inpatient hospital stay due to pending hospice DC  Discharge Plan: Anticipate discharge in 24-48 hrs to home with home services.    Code Status: Level 3 - DNAR and DNI    Subjective:   No overnight events noted    Objective:     Vitals:   Temp (24hrs), Av.4 °F (36.9 °C), Min:98.4 °F (36.9 °C), Max:98.4 °F (36.9 °C)    Temp:  [98.4 °F (36.9 °C)] 98.4 °F (36.9 °C)  HR:  [82-84] 82  Resp:  [17-20] 20  BP: (118-121)/(63-65) 118/65  SpO2:  [93 %-96 %] 93 %  Body mass index is 37.94 kg/m².     Input and Output Summary (last 24 hours):     Intake/Output Summary (Last 24 hours) at 1/15/2024 1405  Last data filed at 1/15/2024 0702  Gross per 24 hour   Intake 960 ml   Output 250 ml   Net 710 ml       Physical Exam:   Physical Exam  Constitutional:       General: She is not in acute distress.     Appearance: She is obese.   HENT:      Head: Normocephalic and atraumatic.      Nose: Nose normal.      Mouth/Throat:      Mouth: Mucous membranes are moist.   Eyes:      Extraocular Movements: Extraocular movements intact.      Conjunctiva/sclera: Conjunctivae normal.   Cardiovascular:      Rate and Rhythm: Normal rate and regular rhythm.    Pulmonary:      Effort: Pulmonary effort is normal. No respiratory distress.   Abdominal:      General: There is distension.      Palpations: Abdomen is soft.      Tenderness: There is no abdominal tenderness.   Musculoskeletal:         General: Normal range of motion.      Cervical back: Normal range of motion and neck supple.   Skin:     General: Skin is warm and dry.   Neurological:      General: No focal deficit present.      Mental Status: She is alert. Mental status is at baseline.      Cranial Nerves: No cranial nerve deficit.   Psychiatric:         Mood and Affect: Mood normal.         Behavior: Behavior normal.          Additional Data:     Labs:  Results from last 7 days   Lab Units 01/15/24  0522   WBC Thousand/uL 6.20   HEMOGLOBIN g/dL 10.8*   HEMATOCRIT % 34.1*   PLATELETS Thousands/uL 192   NEUTROS PCT % 51   LYMPHS PCT % 32   MONOS PCT % 12   EOS PCT % 4     Results from last 7 days   Lab Units 01/15/24  0522 01/13/24  0422 01/12/24  2307   SODIUM mmol/L 138   < > 132*   POTASSIUM mmol/L 3.5   < > 4.0   CHLORIDE mmol/L 104   < > 94*   CO2 mmol/L 26   < > 25   BUN mg/dL 5   < > 9   CREATININE mg/dL 0.64   < > 0.84   ANION GAP mmol/L 8   < > 13   CALCIUM mg/dL 7.8*   < > 9.0   ALBUMIN g/dL  --   --  3.7   TOTAL BILIRUBIN mg/dL  --   --  0.55   ALK PHOS U/L  --   --  126*   ALT U/L  --   --  12   AST U/L  --   --  23   GLUCOSE RANDOM mg/dL 124   < > 308*    < > = values in this interval not displayed.         Results from last 7 days   Lab Units 01/15/24  1120 01/15/24  0702 01/14/24  2154 01/14/24  1606 01/14/24  1204 01/14/24  0721 01/14/24  0112 01/13/24  2037 01/13/24  1658 01/13/24  1158 01/13/24  0544 01/13/24  0136   POC GLUCOSE mg/dl 144* 100 204* 191* 182* 197* 178* 158* 200* 183* 225* 269*               Lines/Drains:  Invasive Devices       Peripheral Intravenous Line  Duration             Peripheral IV 01/12/24 Left Antecubital 2 days                          Imaging: No pertinent imaging  reviewed.    Recent Cultures (last 7 days):         Last 24 Hours Medication List:   Current Facility-Administered Medications   Medication Dose Route Frequency Provider Last Rate    amLODIPine  5 mg Oral Daily Ro Perkins PA-C      apixaban  5 mg Oral BID Ro Perkins PA-C      ascorbic acid  500 mg Oral Daily Ro Perkins PA-C      bisacodyl  10 mg Rectal Daily PRN Risa Wyman DO      docusate sodium  100 mg Oral BID Ro Perkins PA-C      HYDROmorphone  0.5 mg Intravenous Q4H PRN Ro Perkins PA-C      insulin glargine  15 Units Subcutaneous HS Zoe Butler MD      insulin lispro  1-5 Units Subcutaneous HS Ro Perkins PA-C      insulin lispro  1-6 Units Subcutaneous TID AC Ro Perkins PA-C      ondansetron  4 mg Intravenous Q6H PRN Ro Perkins PA-C      oxyCODONE  10 mg Oral Q6H PRN Ro Perkins PA-C      oxyCODONE  2.5 mg Oral Q6H PRN Ro Perkins PA-C      polyethylene glycol  17 g Oral BID Risa Wyman DO          Today, Patient Was Seen By: Kaitlynn Jane MD    **Please Note: This note may have been constructed using a voice recognition system.**

## 2024-01-15 NOTE — TELEPHONE ENCOUNTER
Patient Call    Who are you speaking with? Child    If it is not the patient, are they listed on an active communication consent form? Yes   What is the reason for this call? Pt is still in the hospital but they ask if they can still speak with Dr. Marquez   Does this require a call back? Yes   If a call back is required, please list best call back number [phone]    If a call back is required, advise that a message will be forwarded to their care team and someone will return their call as soon as possible.   Did you relay this information to the patient? Yes

## 2024-01-15 NOTE — PLAN OF CARE
Problem: SAFETY ADULT  Goal: Patient will remain free of falls  Description: INTERVENTIONS:  - Educate patient/family on patient safety including physical limitations  - Instruct patient to call for assistance with activity   - Consult OT/PT to assist with strengthening/mobility   - Keep Call bell within reach  - Keep bed low and locked with side rails adjusted as appropriate  - Keep care items and personal belongings within reach  - Initiate and maintain comfort rounds  - Make Fall Risk Sign visible to staff  - Offer Toileting every 2 Hours, in advance of need  - Initiate/Maintain 2alarm  - Obtain necessary fall risk management equipment: 2  - Apply yellow socks and bracelet for high fall risk patients  - Consider moving patient to room near nurses station  Outcome: Progressing  Goal: Maintain or return to baseline ADL function  Description: INTERVENTIONS:  -  Assess patient's ability to carry out ADLs; assess patient's baseline for ADL function and identify physical deficits which impact ability to perform ADLs (bathing, care of mouth/teeth, toileting, grooming, dressing, etc.)  - Assess/evaluate cause of self-care deficits   - Assess range of motion  - Assess patient's mobility; develop plan if impaired  - Assess patient's need for assistive devices and provide as appropriate  - Encourage maximum independence but intervene and supervise when necessary  - Involve family in performance of ADLs  - Assess for home care needs following discharge   - Consider OT consult to assist with ADL evaluation and planning for discharge  - Provide patient education as appropriate  Outcome: Progressing  Goal: Maintains/Returns to pre admission functional level  Description: INTERVENTIONS:  - Perform AM-PAC 6 Click Basic Mobility/ Daily Activity assessment daily.  - Set and communicate daily mobility goal to care team and patient/family/caregiver.   - Collaborate with rehabilitation services on mobility goals if consulted  -  Reposition patient every 2 hours.  - Out of bed to chair for meals   - Out of bed for toileting  - Record patient progress and toleration of activity level   Outcome: Progressing

## 2024-01-15 NOTE — ASSESSMENT & PLAN NOTE
Lab Results   Component Value Date    HGBA1C 11.6 (H) 10/17/2023       Recent Labs     01/14/24  1606 01/14/24  2154 01/15/24  0702 01/15/24  1120   POCGLU 191* 204* 100 144*         Blood Sugar Average: Last 72 hrs:  (P) [id number]  Add Lantus at night  Continue Accu-Cheks before meals and at bedtime with sliding scale coverage otherwise  Target blood sugar for the hospital is 140-190

## 2024-01-15 NOTE — PROGRESS NOTES
HCC coding opportunities          Chart Reviewed number of suggestions sent to Provider: 3     Patients Insurance   I48.91, E11.65 and E11.3293  Medicare Insurance: Medicare

## 2024-01-15 NOTE — ASSESSMENT & PLAN NOTE
Please see previous discussions regarding goals of care.  Patient's family and patient spoke with oncology today.  Decision has been made to proceed with hospice.  Case management/hospice team working on discharge planning

## 2024-01-15 NOTE — HOSPICE NOTE
Spoke with son Cal, he would like to bring his mother home to his house in [place] with Hospice. Equipment ordered for delivery tomorrow. He stated he would transport his mother home, weather permitting. I updated CARIN Yanes.

## 2024-01-16 ENCOUNTER — HOME CARE VISIT (OUTPATIENT)
Dept: HOME HOSPICE | Facility: HOSPICE | Age: 84
End: 2024-01-16
Payer: MEDICARE

## 2024-01-16 PROBLEM — Z11.1 TUBERCULIN SKIN TEST ENCOUNTER: Status: ACTIVE | Noted: 2024-01-16

## 2024-01-16 LAB
GLUCOSE SERPL-MCNC: 121 MG/DL (ref 65–140)
GLUCOSE SERPL-MCNC: 194 MG/DL (ref 65–140)
GLUCOSE SERPL-MCNC: 197 MG/DL (ref 65–140)
GLUCOSE SERPL-MCNC: 88 MG/DL (ref 65–140)

## 2024-01-16 PROCEDURE — 97166 OT EVAL MOD COMPLEX 45 MIN: CPT

## 2024-01-16 PROCEDURE — 82948 REAGENT STRIP/BLOOD GLUCOSE: CPT

## 2024-01-16 PROCEDURE — 97162 PT EVAL MOD COMPLEX 30 MIN: CPT

## 2024-01-16 PROCEDURE — 99232 SBSQ HOSP IP/OBS MODERATE 35: CPT | Performed by: INTERNAL MEDICINE

## 2024-01-16 RX ORDER — OXYCODONE HYDROCHLORIDE 10 MG/1
10 TABLET ORAL EVERY 6 HOURS PRN
Qty: 20 TABLET | Refills: 0 | Status: SHIPPED | OUTPATIENT
Start: 2024-01-16 | End: 2024-01-26

## 2024-01-16 RX ADMIN — OXYCODONE HYDROCHLORIDE 10 MG: 10 TABLET ORAL at 19:59

## 2024-01-16 RX ADMIN — OXYCODONE HYDROCHLORIDE AND ACETAMINOPHEN 500 MG: 500 TABLET ORAL at 08:47

## 2024-01-16 RX ADMIN — AMLODIPINE BESYLATE 5 MG: 5 TABLET ORAL at 08:47

## 2024-01-16 RX ADMIN — TUBERCULIN PURIFIED PROTEIN DERIVATIVE 5 UNITS: 5 INJECTION, SOLUTION INTRADERMAL at 16:21

## 2024-01-16 RX ADMIN — INSULIN GLARGINE 15 UNITS: 100 INJECTION, SOLUTION SUBCUTANEOUS at 21:41

## 2024-01-16 RX ADMIN — APIXABAN 5 MG: 5 TABLET, FILM COATED ORAL at 17:22

## 2024-01-16 RX ADMIN — INSULIN LISPRO 1 UNITS: 100 INJECTION, SOLUTION INTRAVENOUS; SUBCUTANEOUS at 21:42

## 2024-01-16 RX ADMIN — OXYCODONE HYDROCHLORIDE 10 MG: 10 TABLET ORAL at 13:21

## 2024-01-16 RX ADMIN — INSULIN LISPRO 2 UNITS: 100 INJECTION, SOLUTION INTRAVENOUS; SUBCUTANEOUS at 12:07

## 2024-01-16 RX ADMIN — APIXABAN 5 MG: 5 TABLET, FILM COATED ORAL at 08:47

## 2024-01-16 NOTE — PLAN OF CARE
Problem: OCCUPATIONAL THERAPY ADULT  Goal: Performs self-care activities at highest level of function for planned discharge setting.  See evaluation for individualized goals.  Description: Treatment Interventions: ADL retraining, Functional transfer training, UE strengthening/ROM, Endurance training, Patient/family training, Equipment evaluation/education, Compensatory technique education, Energy conservation, Activityengagement    See flowsheet documentation for full assessment, interventions and recommendations.   Note: Limitation: Decreased ADL status, Decreased Safe judgement during ADL, Decreased endurance, Decreased self-care trans, Decreased high-level ADLs  Prognosis: Good  Assessment: Pt is a 83 y.o. female seen for OT evaluation s/p admit to [facility] on 1/12/2024 w/ Pancreatic adenocarcinoma (HCC).  Comorbidities affecting pt's functional performance at time of assessment include:  HTN, HLD, DM II, CAD, CKD, Eczema . Personal factors affecting pt at time of IE include:difficulty performing ADLS. Prior to admission, pt was Mod I with ADLs. Upon evaluation: the following deficits impact occupational performance: decreased balance and decreased tolerance. Pt to benefit from continued skilled OT tx while in the hospital to address deficits as defined above and maximize level of functional independence w ADL's and functional mobility. Occupational Performance areas to address include: bathing/shower, toilet hygiene, dressing, functional mobility, and clothing management. From OT standpoint, recommendation at time of d/c would be Level III (Minimum Resource Intensity).     Rehab Resource Intensity Level, OT: III (Minimum Resource Intensity)     Beverly Vivar MS, OTR/L

## 2024-01-16 NOTE — OCCUPATIONAL THERAPY NOTE
"Occupational Therapy Evaluation      Candi KANE Pauline    1/16/2024    Principal Problem:    Pancreatic adenocarcinoma with liver mets (HCC)  Active Problems:    Type 2 diabetes mellitus with stage 3 chronic kidney disease, without long-term current use of insulin, unspecified whether stage 3a or 3b CKD (HCC)    Coronary artery disease involving native coronary artery of native heart without angina pectoris    Elevated lipase    Hyponatremia    Superior mesenteric vein thrombosis (HCC)    Goals of care, counseling/discussion      Past Medical History:   Diagnosis Date    Bereavement, uncomplicated     [id number]  RESOLVED    Coronary artery disease     Diabetes mellitus (HCC)     Hyperlipidemia     Hypertension     Lymphedema     Morbid obesity (HCC)     Myocardial infarction (HCC)        Past Surgical History:   Procedure Laterality Date    CARDIAC CATHETERIZATION      CARPAL TUNNEL RELEASE Bilateral     HIP SURGERY      IR BIOPSY LIVER MASS  01/05/2024    TUBAL LIGATION          01/16/24 1033   OT Last Visit   OT Visit Date 01/16/24   Note Type   Note type Evaluation   Pain Assessment   Pain Assessment Tool 0-10   Pain Score No Pain   Restrictions/Precautions   Weight Bearing Precautions Per Order No   Other Precautions Chair Alarm;Bed Alarm;Fall Risk   Home Living   Type of Home House   Home Layout Two level;Performs ADLs on one level;Able to live on main level with bedroom/bathroom;Access   Bathroom Shower/Tub Walk-in shower   Bathroom Toilet   (patient unsure of toilet height)   Bathroom Equipment Shower chair;Commode  (being delivered today)   Bathroom Accessibility Accessible   Home Equipment Walker;Cane  (SPC \"all the time for balance\")   Prior Function   Level of [place] Independent with ADLs;Independent with functional mobility;Needs assistance with IADLS   Lives With Son;Family  (\"for awhile, I don't know how long\" -transitioning to son's home)   Receives Help From Family   IADLs Family/Friend/Other " provides transportation;Family/Friend/Other provides meals;Family/Friend/Other provides medication management  (hospice will be coming daily)   Falls in the last 6 months 0  (denies)   Vocational Retired   ADL   UB Bathing Assistance 5  Supervision/Setup   LB Bathing Assistance 5  Supervision/Setup   UB Dressing Assistance 5  Supervision/Setup   LB Dressing Assistance 5  Supervision/Setup   Bed Mobility   Additional Comments Pt OOB in chair upon arrival and conclusion   Transfers   Sit to Stand 5  Supervision   Additional items Assist x 1;Armrests;Increased time required;Verbal cues   Stand to Sit 5  Supervision   Additional items Assist x 1;Armrests;Increased time required;Verbal cues   Functional Mobility   Functional Mobility 5  Supervision   Additional Comments A x1   Additional items Rolling walker   Balance   Static Sitting Good   Dynamic Sitting Fair +   Static Standing Fair +   Dynamic Standing Fair   Ambulatory Fair   Activity Tolerance   Activity Tolerance Patient limited by fatigue   Medical Staff Made Aware CM notified   RUE Assessment   RUE Assessment WFL   LUE Assessment   LUE Assessment WFL   Vision-Basic Assessment   Current Vision Wears glasses only for reading   Cognition   Overall Cognitive Status WFL   Arousal/Participation Alert;Cooperative   Attention Within functional limits   Orientation Level Oriented X4   Memory Within functional limits   Following Commands Follows all commands and directions without difficulty   Assessment   Limitation Decreased ADL status;Decreased Safe judgement during ADL;Decreased endurance;Decreased self-care trans;Decreased high-level ADLs   Prognosis Good   Assessment Pt is a 83 y.o. female seen for OT evaluation s/p admit to [facility] on 1/12/2024 w/ Pancreatic adenocarcinoma (HCC).  Comorbidities affecting pt's functional performance at time of assessment include:  HTN, HLD, DM II, CAD, CKD, Eczema . Personal factors affecting pt at time of IE include:difficulty  performing ADLS. Prior to admission, pt was Mod I with ADLs. Upon evaluation: the following deficits impact occupational performance: decreased balance and decreased tolerance. Pt to benefit from continued skilled OT tx while in the hospital to address deficits as defined above and maximize level of functional independence w ADL's and functional mobility. Occupational Performance areas to address include: bathing/shower, toilet hygiene, dressing, functional mobility, and clothing management. From OT standpoint, recommendation at time of d/c would be Level III (Minimum Resource Intensity).   Goals   Patient Goals to go home with her family this morning   Plan   Treatment Interventions ADL retraining;Functional transfer training;UE strengthening/ROM;Endurance training;Patient/family training;Equipment evaluation/education;Compensatory technique education;Energy conservation;Activityengagement   Goal Expiration Date 01/26/24   OT Treatment Day 0   OT Frequency 1-2x/wk   Discharge Recommendation   Rehab Resource Intensity Level, OT III (Minimum Resource Intensity)   Additional Comments  Pt seen as a co-eval with PT due to the patient's co-morbidities, clinically unstable presentation, and present impairments which are a regression from the patient's baseline.   Additional Comments 2 The patient's raw score on the -PAC Daily Activity Inpatient Short Form is 20. A raw score of greater than or equal to 19 suggests the patient may benefit from discharge to home. Please refer to the recommendation of the Occupational Therapist for safe discharge planning.   AM-PAC Daily Activity Inpatient   Lower Body Dressing 3   Bathing 3   Toileting 3   Upper Body Dressing 3   Grooming 4   Eating 4   Daily Activity Raw Score 20   Daily Activity Standardized Score (Calc for Raw Score >=11) 42.03   AM-PAC Applied Cognition Inpatient   Following a Speech/Presentation 4   Understanding Ordinary Conversation 4   Taking Medications 4    Remembering Where Things Are Placed or Put Away 4   Remembering List of 4-5 Errands 4   Taking Care of Complicated Tasks 4   Applied Cognition Raw Score 24   Applied Cognition Standardized Score 62.21     GOALS:    Pt will achieve the following within specified time frame: STG  Pt will achieve the following goals within 5 days    *ADL transfers with (I) for inc'd independence with ADLs/purposeful tasks    *UB ADL with (I) for inc'd independence with self cares    *LB ADL with (I) using AE prn for inc'd independence with self cares    *Toileting with (I) for clothing management and hygiene for return to PLOF with personal care    *Increase static stand balance to G- and dyn stand balance to F+ for inc'd safety with standing purposeful tasks    *Increase stand tolerance x3 m for inc'd tolerance with standing purposeful tasks    *Participate in 10m UE therex to increase overall stamina/activity tolerance for purposeful tasks    *Bed mobility- (S) for inc'd independence to manage own comfort and initiate EOB & OOB purposeful tasks    Pt will achieve the following within specified time frame: LTG  Pt will achieve the following goals within 10 days    *Increase static stand balance to G and dyn stand balance to G- for inc'd safety with standing purposeful tasks    *Increase stand tolerance x5 m for inc'd tolerance with standing purposeful tasks    *Bed mobility- (I) for inc'd independence to manage own comfort and initiate EOB & OOB purposeful tasks      Beverly Vivar, MS, OTR/L

## 2024-01-16 NOTE — PLAN OF CARE
Problem: PHYSICAL THERAPY ADULT  Goal: Performs mobility at highest level of function for planned discharge setting.  See evaluation for individualized goals.  Description: Treatment/Interventions: Functional transfer training, LE strengthening/ROM, Elevations, Therapeutic exercise, Endurance training, Patient/family training, Equipment eval/education, Gait training, Spoke to nursing, Spoke to case management  Equipment Recommended: Walker (patient has own)       See flowsheet documentation for full assessment, interventions and recommendations.  Note: Prognosis: Fair  Problem List: Decreased strength, Decreased endurance, Impaired balance, Decreased mobility, Obesity  Assessment: Pt is 83 y.o. female seen for PT evaluation s/p admit to  [facility]  on 1/12/2024 w/ Pancreatic adenocarcinoma (HCC). PT consulted to assess pt's functional mobility and d/c needs. Order placed for PT eval and tx, w/ up and OOB as tolerated order. Comorbidities affecting pt's physical performance at time of assessment include: pancreatic adenocarcinoma with liver mets,type 2 DM, obesity,hyponatremia . PTA, pt was independent w/ all functional mobility w/ SPC usage . Personal factors affecting pt at time of IE include: inability to navigate community distances, unable to perform dynamic tasks in community, and inability to perform IADLs. Please find objective findings from PT assessment regarding body systems outlined above with impairments and limitations including weakness, impaired balance, decreased endurance, gait deviations, decreased activity tolerance, and fall risk. The following objective measures performed on IE also reveal limitations: -PAC 6-Clicks: 18/24.From PT/mobility standpoint, recommendation at time of d/c would be of minimal resource intensity pending progress in order to facilitate return to PLOF.        Rehab Resource Intensity Level, PT: III (Minimum Resource Intensity)    See flowsheet  documentation for full assessment.

## 2024-01-16 NOTE — ASSESSMENT & PLAN NOTE
Patient initially presented with abdominal pain and constipation  Suspect that this is secondary to metastatic pancreatic adenocarcinoma with extensive necrosis and liver involvement.  Patient is status post GI evaluation-no plan for any acute phase interventional procedure  Supportive therapy only  Continue MiraLAX  Will advance diet as tolerated  Continue pain management  Discharge planning for 24 to 48 hours

## 2024-01-16 NOTE — PROGRESS NOTES
[facility]  Progress Note  Name: Candi Carpenter I  MRN: [id number]  Unit/Bed#: -01 I Date of Admission: 1/12/2024   Date of Service: 1/16/2024 I Hospital Day: 3    Assessment/Plan   * Pancreatic adenocarcinoma with liver mets (HCC)  Assessment & Plan  Patient initially presented with abdominal pain and constipation  Suspect that this is secondary to metastatic pancreatic adenocarcinoma with extensive necrosis and liver involvement.  Patient is status post GI evaluation-no plan for any acute phase interventional procedure  Supportive therapy only  Continue MiraLAX  Will advance diet as tolerated  Continue pain management  Discharge planning for 24 to 48 hours    Tuberculin skin test encounter  Assessment & Plan  Case management requested TB testing as patient will be staying with her son who has foster children.  Per State requirement anybody living in the home will need TB testing.  PPD has been ordered.  Results to be read by visiting nurse/hospice team    Goals of care, counseling/discussion  Assessment & Plan  Please see previous discussions regarding goals of care.  Patient's family and patient spoke with oncology today.  Decision has been made to proceed with hospice.  Case management/hospice team working on discharge planning    Superior mesenteric vein thrombosis (HCC)  Assessment & Plan  Continue Eliquis 5mg BID    Hyponatremia  Assessment & Plan  Hypovolemic hyponatremia    Elevated lipase  Assessment & Plan  Suspect secondary to pancreatic mass with liver mets  No need to recheck    Coronary artery disease involving native coronary artery of native heart without angina pectoris  Assessment & Plan  Continue apixaban  Continue supportive therapy otherwise    Type 2 diabetes mellitus with stage 3 chronic kidney disease, without long-term current use of insulin, unspecified whether stage 3a or 3b CKD (HCC)  Assessment & Plan  Lab Results   Component Value Date    HGBA1C 11.6 (H)  10/17/2023       Recent Labs     01/15/24  1624 01/15/24  2041 24  0713 24  1206   POCGLU 171* 149* 88 197*         Blood Sugar Average: Last 72 hrs:  (P) 177.25  Add Lantus at night  Continue Accu-Cheks before meals and at bedtime with sliding scale coverage otherwise  Target blood sugar for the hospital is 140-190               VTE Pharmacologic Prophylaxis: VTE Score: 6 Moderate Risk (Score 3-4) - Pharmacological DVT Prophylaxis Ordered: apixaban (Eliquis).    Mobility:   Basic Mobility Inpatient Raw Score: 18  JH-HLM Goal: 6: Walk 10 steps or more  JH-HLM Achieved: 7: Walk 25 feet or more  HLM Goal achieved. Continue to encourage appropriate mobility.    Patient Centered Rounds: I performed bedside rounds with nursing staff today.   Discussions with Specialists or Other Care Team Provider: yes    Education and Discussions with Family / Patient: Updated  (son) via phone.    Current Length of Stay: 3 day(s)  Current Patient Status: Inpatient   Certification Statement: The patient will continue to require additional inpatient hospital stay due to hospice  Discharge Plan: Anticipate discharge tomorrow to home.    Code Status: Level 3 - DNAR and DNI    Subjective:   No overnight events noted    Objective:     Vitals:   Temp (24hrs), Av.6 °F (37 °C), Min:98.5 °F (36.9 °C), Max:98.7 °F (37.1 °C)    Temp:  [98.5 °F (36.9 °C)-98.7 °F (37.1 °C)] 98.7 °F (37.1 °C)  HR:  [86-99] 86  Resp:  [18-20] 18  BP: (105-133)/(57-72) 105/58  SpO2:  [92 %-93 %] 93 %  Body mass index is 37.94 kg/m².     Input and Output Summary (last 24 hours):     Intake/Output Summary (Last 24 hours) at 2024 1523  Last data filed at 2024 1244  Gross per 24 hour   Intake 460 ml   Output --   Net 460 ml       Physical Exam:   Physical Exam  Constitutional:       General: She is not in acute distress.  HENT:      Head: Normocephalic and atraumatic.      Nose: Nose normal.      Mouth/Throat:      Mouth: Mucous  membranes are moist.   Eyes:      Extraocular Movements: Extraocular movements intact.      Conjunctiva/sclera: Conjunctivae normal.   Cardiovascular:      Rate and Rhythm: Normal rate and regular rhythm.   Pulmonary:      Effort: Pulmonary effort is normal. No respiratory distress.   Abdominal:      General: There is distension.      Palpations: Abdomen is soft.      Tenderness: There is no abdominal tenderness.   Musculoskeletal:         General: Normal range of motion.      Cervical back: Normal range of motion and neck supple.      Comments: Generalized weakness   Skin:     General: Skin is warm and dry.   Neurological:      General: No focal deficit present.      Mental Status: She is alert. Mental status is at baseline.      Cranial Nerves: No cranial nerve deficit.   Psychiatric:         Mood and Affect: Mood normal.         Behavior: Behavior normal.          Additional Data:     Labs:  Results from last 7 days   Lab Units 01/15/24  0522   WBC Thousand/uL 6.20   HEMOGLOBIN g/dL 10.8*   HEMATOCRIT % 34.1*   PLATELETS Thousands/uL 192   NEUTROS PCT % 51   LYMPHS PCT % 32   MONOS PCT % 12   EOS PCT % 4     Results from last 7 days   Lab Units 01/15/24  0522 01/13/24  0422 01/12/24  2307   SODIUM mmol/L 138   < > 132*   POTASSIUM mmol/L 3.5   < > 4.0   CHLORIDE mmol/L 104   < > 94*   CO2 mmol/L 26   < > 25   BUN mg/dL 5   < > 9   CREATININE mg/dL 0.64   < > 0.84   ANION GAP mmol/L 8   < > 13   CALCIUM mg/dL 7.8*   < > 9.0   ALBUMIN g/dL  --   --  3.7   TOTAL BILIRUBIN mg/dL  --   --  0.55   ALK PHOS U/L  --   --  126*   ALT U/L  --   --  12   AST U/L  --   --  23   GLUCOSE RANDOM mg/dL 124   < > 308*    < > = values in this interval not displayed.         Results from last 7 days   Lab Units 01/16/24  1206 01/16/24  0713 01/15/24  2041 01/15/24  1624 01/15/24  1120 01/15/24  0702 01/14/24  2154 01/14/24  1606 01/14/24  1204 01/14/24  0721 01/14/24  0112 01/13/24  2037   POC GLUCOSE mg/dl 197* 88 149* 171* 144*  100 204* 191* 182* 197* 178* 158*               Lines/Drains:  Invasive Devices       Peripheral Intravenous Line  Duration             Peripheral IV 01/12/24 Left Antecubital 3 days                          Imaging: No pertinent imaging reviewed.    Recent Cultures (last 7 days):         Last 24 Hours Medication List:   Current Facility-Administered Medications   Medication Dose Route Frequency Provider Last Rate    amLODIPine  5 mg Oral Daily Ro Perkins PA-C      apixaban  5 mg Oral BID Ro Perkins PA-C      ascorbic acid  500 mg Oral Daily Ro Perkins PA-C      bisacodyl  10 mg Rectal Daily PRN Risa Wyman,       docusate sodium  100 mg Oral BID Ro Perkins PA-C      HYDROmorphone  0.5 mg Intravenous Q4H PRN Ro Perkins PA-C      insulin glargine  15 Units Subcutaneous HS Zoe Butler MD      insulin lispro  1-5 Units Subcutaneous HS Ro Perkins PA-C      insulin lispro  1-6 Units Subcutaneous TID AC Ro Perkins PA-C      ondansetron  4 mg Intravenous Q6H PRN Ro Perkins PA-C      oxyCODONE  10 mg Oral Q6H PRN Ro Perkins PA-C      oxyCODONE  2.5 mg Oral Q6H PRN EDNA Daniel-BENJA      polyethylene glycol  17 g Oral BID Risa Wyman, DO      tuberculin  5 Units Intradermal Once Kaitlynn Jane MD          Today, Patient Was Seen By: Kaitlynn Jane MD    **Please Note: This note may have been constructed using a voice recognition system.**

## 2024-01-16 NOTE — PHYSICAL THERAPY NOTE
Physical Therapy Evaluation     Patient's Name: Candi Carpenter    Admitting Diagnosis  Pancreatitis [K85.90]  Abdominal pain [R10.9]  Constipation [K59.00]  Cancer associated pain [G89.3]    Problem List  Patient Active Problem List   Diagnosis    Hypertension    Hyperlipidemia    Allergic rhinitis    Esophageal reflux    Type 2 diabetes mellitus with stage 3 chronic kidney disease, without long-term current use of insulin, unspecified whether stage 3a or 3b CKD (HCC)    Chronic right-sided low back pain with right-sided sciatica    Coronary artery disease involving native coronary artery of native heart without angina pectoris    Cotton wool exudates    Vaccination not carried out because of patient refusal    Stage 3 chronic kidney disease, unspecified whether stage 3a or 3b CKD (HCC)    Pruritic dermatitis    Eczema    Callus    Onychomycosis of toenail    Trachyonychia    Elevated lipase    Abdominal pain    Hyponatremia    Pancreatic adenocarcinoma with liver mets (HCC)    Superior mesenteric vein thrombosis (HCC)    Goals of care, counseling/discussion       Past Medical History  Past Medical History:   Diagnosis Date    Bereavement, uncomplicated     [id number]  RESOLVED    Coronary artery disease     Diabetes mellitus (HCC)     Hyperlipidemia     Hypertension     Lymphedema     Morbid obesity (HCC)     Myocardial infarction (HCC)        Past Surgical History  Past Surgical History:   Procedure Laterality Date    CARDIAC CATHETERIZATION      CARPAL TUNNEL RELEASE Bilateral     HIP SURGERY      IR BIOPSY LIVER MASS  01/05/2024    TUBAL LIGATION          01/16/24 1026   PT Last Visit   PT Visit Date 01/16/24   Note Type   Note type Evaluation   Pain Assessment   Pain Assessment Tool 0-10   Pain Score No Pain  (denies)   Restrictions/Precautions   Weight Bearing Precautions Per Order No   Other Precautions Chair Alarm;Bed Alarm;Fall Risk   Home Living   Type of Home House   Home Layout Two level;Performs ADLs on  "one level;Able to live on main level with bedroom/bathroom;Access   Bathroom Shower/Tub Walk-in shower   Bathroom Toilet   (patient unsure of toilet height)   Bathroom Equipment Shower chair;Commode  (being delivered today)   Bathroom Accessibility Accessible   Home Equipment Walker;Cane  (SPC \"all the time for balance\")   Prior Function   Level of [place] Independent with ADLs;Independent with functional mobility;Needs assistance with IADLS   Lives With Son;Family  (\"for awhile, I don't know how long\" -transitioning to son's home)   Receives Help From Family   IADLs Family/Friend/Other provides transportation;Family/Friend/Other provides meals;Family/Friend/Other provides medication management  (hospice will be coming daily)   Falls in the last 6 months 0  (denies)   Vocational Retired   General   Additional Pertinent History Myrisa OT present for co-assessment due to medical complexity, required skilled interventions of 2 clinicians for care delivery.   Family/Caregiver Present No   Cognition   Overall Cognitive Status WFL   Arousal/Participation Alert   Orientation Level Oriented X4   Memory Within functional limits   Following Commands Follows all commands and directions without difficulty   Comments Candi was agreeable to PT assessment,pleasant.   RLE Assessment   RLE Assessment X  (3+/5 gross musculature)   LLE Assessment   LLE Assessment X  (3+/5 gross musculature)   Vision-Basic Assessment   Current Vision Wears glasses only for reading   Vestibular   Spontaneous Nystagmus (-) no evidence of nystagmus at rest in room light   Gaze Holding Nystagmus (-) no evidence of nystagmus   Coordination   Movements are Fluid and Coordinated 1   Sharp/Dull   RLE Sharp/Dull Grossly intact   LLE Sharp/Dull Grossly intact   Bed Mobility   Additional Comments Bed mobility was not tested as Candi was sitting out of bed on the recliner prior and after assessment.   Transfers   Sit to Stand 5  Supervision   Additional items " Assist x 1;Armrests;Increased time required;Verbal cues  (RW utilization)   Stand to Sit 5  Supervision   Additional items Assist x 1;Armrests;Increased time required;Verbal cues   Stand pivot 5  Supervision   Additional items Assist x 1;Increased time required;Verbal cues   Additional Comments Verbal cues for proper BUE placement with transitional movements, safety while turning.   Ambulation/Elevation   Gait pattern Improper Weight shift;Narrow IVON;Forward Flexion;Short stride   Gait Assistance 5  Supervision   Additional items Assist x 1;Verbal cues   Assistive Device Rolling walker   Distance 20 feet x 2   Stair Management Assistance Not tested   Ambulation/Elevation Additional Comments Verbal cues for base of support widening and proper AD utilization.   Balance   Static Sitting Good   Dynamic Sitting Fair +   Static Standing Fair +   Dynamic Standing Fair   Ambulatory Fair   Endurance Deficit   Endurance Deficit Yes   Activity Tolerance   Activity Tolerance Patient limited by fatigue   Medical Staff Made Aware Yes, CM was informed of d/c disposition recommendation.   Nurse Made Aware Yes, nursing staff was informed of assessment outcome.   Assessment   Prognosis Fair   Problem List Decreased strength;Decreased endurance;Impaired balance;Decreased mobility;Obesity   Assessment Pt is 83 y.o. female seen for PT evaluation s/p admit to  [facility]  on 1/12/2024 w/ Pancreatic adenocarcinoma (HCC). PT consulted to assess pt's functional mobility and d/c needs. Order placed for PT eval and tx, w/ up and OOB as tolerated order. Comorbidities affecting pt's physical performance at time of assessment include: pancreatic adenocarcinoma with liver mets,type 2 DM, obesity,hyponatremia . PTA, pt was independent w/ all functional mobility w/ SPC usage . Personal factors affecting pt at time of IE include: inability to navigate community distances, unable to perform dynamic tasks in community, and inability to  perform IADLs. Please find objective findings from PT assessment regarding body systems outlined above with impairments and limitations including weakness, impaired balance, decreased endurance, gait deviations, decreased activity tolerance, and fall risk. The following objective measures performed on IE also reveal limitations: AM-PAC 6-Clicks: 18/24.From PT/mobility standpoint, recommendation at time of d/c would be of minimal resource intensity pending progress in order to facilitate return to PLOF.   Goals   Patient Goals to go home with her family this morning   LTG Expiration Date 01/26/24   Long Term Goal #1 Patient will complete transfers modified I  to decrease risk of falls, facilitate upright standing posture. BLE strength to greater than/equal to 4/5 gross musculature to increase ability to safely transfer, control descent to chair. Patient will exhibit increase dynamic standing balance to Good 5-7 minutes without LOB distant supervision to improve endurance. Patient will exhibit increase dynamic ambulatory balance to Fair+ 150-200 feet w/ AD distant supervision to improve ability to mobilize to toilet, chair and decrease risk for additional medical complications. Patient will exhibit good self monitoring and ability to follow 2 step commands to increase complexity of tasks and resume ADL's without LOB.   PT Treatment Day 0   Plan   Treatment/Interventions Functional transfer training;LE strengthening/ROM;Elevations;Therapeutic exercise;Endurance training;Patient/family training;Equipment eval/education;Gait training;Spoke to nursing;Spoke to case management   PT Frequency 2-3x/wk   Discharge Recommendation   Rehab Resource Intensity Level, PT III (Minimum Resource Intensity)   Equipment Recommended Walker  (patient has own)   Walker Package Recommended Wheeled walker   Change/add to Walker Package? No   Additional Comments Upon conclusion, Candi was sitting out of bed on the chair. All of her needs were  within reach.   AM-PAC Basic Mobility Inpatient   Turning in Flat Bed Without Bedrails 3   Lying on Back to Sitting on Edge of Flat Bed Without Bedrails 3   Moving Bed to Chair 3   Standing Up From Chair Using Arms 3   Walk in Room 3   Climb 3-5 Stairs With Railing 3   Basic Mobility Inpatient Raw Score 18   Basic Mobility Standardized Score 41.05   Highest Level Of Mobility   JH-HLM Goal 6: Walk 10 steps or more   JH-HLM Achieved 7: Walk 25 feet or more     History/Personal Factors/Comorbidities: pancreatic adenocarcinoma with liver mets,type 2 DM, obesity,hyponatremia    # of body structures/limitations: muscle weakness,decreased endurance, impaired balance, gait deviations    Clinical presentation: evolving as seen in metastatic cancer diagnosis, fall risk    Initial Assessment Time: [id number]    Malu Pineda, PT

## 2024-01-16 NOTE — CASE MANAGEMENT
Case Management Discharge Planning Note    Patient name Candi Carpenter  Location /-01 MRN [id number]  : 1940 Date 2024       Current Admission Date: 2024  Current Admission Diagnosis:Pancreatic adenocarcinoma with liver mets (HCC)   Patient Active Problem List    Diagnosis Date Noted    Goals of care, counseling/discussion 2024    Pancreatic adenocarcinoma with liver mets (HCC) 2024    Superior mesenteric vein thrombosis (HCC) 2024    Elevated lipase 2024    Abdominal pain 2024    Hyponatremia 2024    Callus 2023    Onychomycosis of toenail 2023    Trachyonychia 2023    Eczema 2023    Stage 3 chronic kidney disease, unspecified whether stage 3a or 3b CKD (HCC) 2023    Pruritic dermatitis 2023    Vaccination not carried out because of patient refusal 2021    Cotton wool exudates 06/15/2021    Coronary artery disease involving native coronary artery of native heart without angina pectoris 2020    Chronic right-sided low back pain with right-sided sciatica 2020    Type 2 diabetes mellitus with stage 3 chronic kidney disease, without long-term current use of insulin, unspecified whether stage 3a or 3b CKD (HCC)     Hyperlipidemia 2013    Allergic rhinitis 2013    Hypertension 2012    Esophageal reflux 2012      LOS (days): 3  Geometric Mean LOS (GMLOS) (days): 4.8  Days to GMLOS:1.7     OBJECTIVE:  Risk of Unplanned Readmission Score: 19.06         Current admission status: Inpatient   Preferred Pharmacy:   [facility] - [address] [address]  Phone: [phone] Fax: [phone]    Primary Care Provider: Ming Paredes MD    Primary Insurance: [email or web address]  Secondary Insurance:     DISCHARGE DETAILS:  Received TT from Misty from [facility].  DME will not be delivered until late today.   Requesting an early morning transport.  As per PT pt can be transported by son.  Spoke to son who will transport tomorrow at 0930.  Son requesting a TB test as the family has foster children and the state requires all household members be tested for TB.  Notified SLIM of same who will do a PPD test.  Notified Misty RN, Sol RN and pt of transport time in am.

## 2024-01-16 NOTE — ASSESSMENT & PLAN NOTE
Case management requested TB testing as patient will be staying with her son who has foster children.  Per State requirement anybody living in the home will need TB testing.  PPD has been ordered.  Results to be read by visiting nurse/hospice team

## 2024-01-17 ENCOUNTER — HOME CARE VISIT (OUTPATIENT)
Dept: HOME HEALTH SERVICES | Facility: HOME HEALTHCARE | Age: 84
End: 2024-01-17
Payer: MEDICARE

## 2024-01-17 ENCOUNTER — TRANSITIONAL CARE MANAGEMENT (OUTPATIENT)
Dept: FAMILY MEDICINE CLINIC | Facility: CLINIC | Age: 84
End: 2024-01-17

## 2024-01-17 VITALS
DIASTOLIC BLOOD PRESSURE: 66 MMHG | RESPIRATION RATE: 19 BRPM | SYSTOLIC BLOOD PRESSURE: 130 MMHG | WEIGHT: 187.83 LBS | HEART RATE: 90 BPM | TEMPERATURE: 99.2 F | BODY MASS INDEX: 37.87 KG/M2 | HEIGHT: 59 IN | OXYGEN SATURATION: 95 %

## 2024-01-17 LAB — GLUCOSE SERPL-MCNC: 123 MG/DL (ref 65–140)

## 2024-01-17 PROCEDURE — G0180 MD CERTIFICATION HHA PATIENT: HCPCS | Performed by: INTERNAL MEDICINE

## 2024-01-17 PROCEDURE — 99239 HOSP IP/OBS DSCHRG MGMT >30: CPT | Performed by: INTERNAL MEDICINE

## 2024-01-17 PROCEDURE — G0299 HHS/HOSPICE OF RN EA 15 MIN: HCPCS

## 2024-01-17 PROCEDURE — 82948 REAGENT STRIP/BLOOD GLUCOSE: CPT

## 2024-01-17 RX ADMIN — APIXABAN 5 MG: 5 TABLET, FILM COATED ORAL at 08:49

## 2024-01-17 RX ADMIN — AMLODIPINE BESYLATE 5 MG: 5 TABLET ORAL at 08:49

## 2024-01-17 RX ADMIN — OXYCODONE HYDROCHLORIDE AND ACETAMINOPHEN 500 MG: 500 TABLET ORAL at 08:49

## 2024-01-17 NOTE — ASSESSMENT & PLAN NOTE
Please see previous discussions regarding goals of care.  Patient's family and patient spoke with oncology today.  Decision has been made to proceed with hospice.  Case management/hospice team have made arrangements for patient be discharged home today

## 2024-01-17 NOTE — ASSESSMENT & PLAN NOTE
Lab Results   Component Value Date    HGBA1C 11.6 (H) 10/17/2023       Recent Labs     01/16/24  1206 01/16/24  1553 01/16/24  2117 01/17/24  0734   POCGLU 197* 121 194* 123         Blood Sugar Average: Last 72 hrs:  (P) 159.3969339271471591  Continue home medications

## 2024-01-17 NOTE — ASSESSMENT & PLAN NOTE
Patient initially presented with abdominal pain and constipation  Suspect that this is secondary to metastatic pancreatic adenocarcinoma with extensive necrosis and liver involvement.  Patient is status post GI evaluation-no plan for any acute phase interventional procedure  Supportive therapy only  Continue MiraLAX  Will advance diet as tolerated  Continue pain management  Patient being discharged home on hospice

## 2024-01-17 NOTE — DISCHARGE SUMMARY
[facility]  Discharge- Candi Carpenter 1940, 83 y.o. female MRN: [id number]  Unit/Bed#: MS Lauren Encounter: [id number]  Primary Care Provider: Ming Paredes MD   Date and time admitted to hospital: 1/12/2024 10:12 PM    * Pancreatic adenocarcinoma with liver mets (HCC)  Assessment & Plan  Patient initially presented with abdominal pain and constipation  Suspect that this is secondary to metastatic pancreatic adenocarcinoma with extensive necrosis and liver involvement.  Patient is status post GI evaluation-no plan for any acute phase interventional procedure  Supportive therapy only  Continue MiraLAX  Will advance diet as tolerated  Continue pain management  Patient being discharged home on hospice    Tuberculin skin test encounter  Assessment & Plan  Case management requested TB testing as patient will be staying with her son who has foster children.  Per State requirement anybody living in the home will need TB testing.  PPD has been ordered.  Results to be read by visiting nurse/hospice team    Goals of care, counseling/discussion  Assessment & Plan  Please see previous discussions regarding goals of care.  Patient's family and patient spoke with oncology today.  Decision has been made to proceed with hospice.  Case management/hospice team have made arrangements for patient be discharged home today    Superior mesenteric vein thrombosis (HCC)  Assessment & Plan  Continue Eliquis 5mg BID    Hyponatremia  Assessment & Plan  Hypovolemic hyponatremia    Elevated lipase  Assessment & Plan  Suspect secondary to pancreatic mass with liver mets  No need to recheck    Coronary artery disease involving native coronary artery of native heart without angina pectoris  Assessment & Plan  Continue apixaban  Continue supportive therapy otherwise    Type 2 diabetes mellitus with stage 3 chronic kidney disease, without long-term current use of insulin, unspecified whether stage 3a or 3b  CKD (HCC)  Assessment & Plan  Lab Results   Component Value Date    HGBA1C 11.6 (H) 10/17/2023       Recent Labs     01/16/24  1206 01/16/24  1553 01/16/24  2117 01/17/24  0734   POCGLU 197* 121 194* 123         Blood Sugar Average: Last 72 hrs:  (P) 159.2618430452561950  Continue home medications        Medical Problems       Resolved Problems  Date Reviewed: 1/13/2024   None       Discharging Physician / Practitioner: Kaitlynn Jane MD  PCP: Ming Paredes MD  Admission Date:   Admission Orders (From admission, onward)       Ordered        01/13/24 1028  Inpatient Admission  Once            01/13/24 0013  Place in Observation  Once                          Discharge Date: 01/17/24    Consultations During Hospital Stay:  Hematology/oncology, gastroenterology    Procedures Performed:   None    Significant Findings / Test Results:   Abdominal pain, metastatic pancreatic cancer    Incidental Findings:   None    Test Results Pending at Discharge (will require follow up):   None     Outpatient Tests Requested:  Routine labs with PCP as needed    Complications: None    Reason for Admission: Abdominal pain    Hospital Course:   Candi Carpenter is a 83 y.o. female patient who originally presented to the hospital on 1/12/2024 due to abdominal pain.  Patient admitted and initiated on pain medications.  Patient with history of metastatic pancreatic cancer and found to have significant constipation.  GI team was consulted.  Bowel regimen was initiated.  During hospitalization given patient's advanced metastatic disease discussion was had with patient's family regarding goals of care.  Hematology/oncology were also consulted and patient's family spoke with oncology.  Patient is not interested in any chemotherapy at this time.  Decision was made to obtain hospice evaluation.  Patient has been accepted by home hospice.  Plan is to transition patient to home hospice at her son's house.  Arrangements have been  "made and patient be discharged home on hospice today.    Please see above list of diagnoses and related plan for additional information.     Condition at Discharge: stable    Discharge Day Visit / Exam:   Subjective: No complaints at this time  Vitals: Blood Pressure: 130/66 (01/17/24 0736)  Pulse: 90 (01/17/24 0736)  Temperature: 99.2 °F (37.3 °C) (01/17/24 0736)  Temp Source: Axillary (01/17/24 0736)  Respirations: 19 (01/17/24 0736)  Height: 4' 11\" (149.9 cm) (01/13/24 0115)  Weight - Scale: 85.2 kg (187 lb 13.3 oz) (01/13/24 0115)  SpO2: 95 % (01/17/24 0736)  Exam:   Physical Exam  Constitutional:       General: She is not in acute distress.  HENT:      Head: Normocephalic and atraumatic.      Nose: Nose normal.      Mouth/Throat:      Mouth: Mucous membranes are moist.   Eyes:      Extraocular Movements: Extraocular movements intact.      Conjunctiva/sclera: Conjunctivae normal.   Cardiovascular:      Rate and Rhythm: Normal rate and regular rhythm.   Pulmonary:      Effort: Pulmonary effort is normal. No respiratory distress.   Abdominal:      General: There is distension.      Palpations: Abdomen is soft.      Tenderness: There is no abdominal tenderness.   Musculoskeletal:         General: Normal range of motion.      Cervical back: Normal range of motion and neck supple.   Skin:     General: Skin is warm and dry.   Neurological:      General: No focal deficit present.      Mental Status: She is alert. Mental status is at baseline.      Cranial Nerves: No cranial nerve deficit.   Psychiatric:         Mood and Affect: Mood normal.         Behavior: Behavior normal.          Discussion with Family:  Spoke with patient's son yesterday regarding plan of care.     Discharge instructions/Information to patient and family:   See after visit summary for information provided to patient and family.      Provisions for Follow-Up Care:  See after visit summary for information related to follow-up care and any pertinent " home health orders.      Mobility at time of Discharge:   Basic Mobility Inpatient Raw Score: 18  JH-HLM Goal: 6: Walk 10 steps or more  JH-HLM Achieved: 6: Walk 10 steps or more  HLM Goal achieved. Continue to encourage appropriate mobility.     Disposition:   Home    Planned Readmission: no     Discharge Statement:  I spent 35 minutes discharging the patient. This time was spent on the day of discharge. I had direct contact with the patient on the day of discharge. Greater than 50% of the total time was spent examining patient, answering all patient questions, arranging and discussing plan of care with patient as well as directly providing post-discharge instructions.  Additional time then spent on discharge activities.    Discharge Medications:  See after visit summary for reconciled discharge medications provided to patient and/or family.      **Please Note: This note may have been constructed using a voice recognition system**

## 2024-01-17 NOTE — PLAN OF CARE
Problem: PAIN - ADULT  Goal: Verbalizes/displays adequate comfort level or baseline comfort level  Description: Interventions:  - Encourage patient to monitor pain and request assistance  - Assess pain using appropriate pain scale  - Administer analgesics based on type and severity of pain and evaluate response  - Implement non-pharmacological measures as appropriate and evaluate response  - Consider cultural and social influences on pain and pain management  - Notify physician/advanced practitioner if interventions unsuccessful or patient reports new pain  Outcome: Progressing     Problem: INFECTION - ADULT  Goal: Absence or prevention of progression during hospitalization  Description: INTERVENTIONS:  - Assess and monitor for signs and symptoms of infection  - Monitor lab/diagnostic results  - Monitor all insertion sites, i.e. indwelling lines, tubes, and drains  - Monitor endotracheal if appropriate and nasal secretions for changes in amount and color  - [place] appropriate cooling/warming therapies per order  - Administer medications as ordered  - Instruct and encourage patient and family to use good hand hygiene technique  - Identify and instruct in appropriate isolation precautions for identified infection/condition  Outcome: Progressing  Goal: Absence of fever/infection during neutropenic period  Description: INTERVENTIONS:  - Monitor WBC    Outcome: Progressing     Problem: SAFETY ADULT  Goal: Patient will remain free of falls  Description: INTERVENTIONS:  - Educate patient/family on patient safety including physical limitations  - Instruct patient to call for assistance with activity   - Consult OT/PT to assist with strengthening/mobility   - Keep Call bell within reach  - Keep bed low and locked with side rails adjusted as appropriate  - Keep care items and personal belongings within reach  - Initiate and maintain comfort rounds  - Make Fall Risk Sign visible to staff  - Offer Toileting every 2 Hours,  in advance of need  - Initiate/Maintain bed alarm  - Apply yellow socks and bracelet for high fall risk patients  - Consider moving patient to room near nurses station  Outcome: Progressing  Goal: Maintain or return to baseline ADL function  Description: INTERVENTIONS:  -  Assess patient's ability to carry out ADLs; assess patient's baseline for ADL function and identify physical deficits which impact ability to perform ADLs (bathing, care of mouth/teeth, toileting, grooming, dressing, etc.)  - Assess/evaluate cause of self-care deficits   - Assess range of motion  - Assess patient's mobility; develop plan if impaired  - Assess patient's need for assistive devices and provide as appropriate  - Encourage maximum independence but intervene and supervise when necessary  - Involve family in performance of ADLs  - Assess for home care needs following discharge   - Consider OT consult to assist with ADL evaluation and planning for discharge  - Provide patient education as appropriate  Outcome: Progressing  Goal: Maintains/Returns to pre admission functional level  Description: INTERVENTIONS:  - Perform AM-PAC 6 Click Basic Mobility/ Daily Activity assessment daily.  - Set and communicate daily mobility goal to care team and patient/family/caregiver.   - Collaborate with rehabilitation services on mobility goals if consulted  - Perform Range of Motion 3 times a day.  - Reposition patient every 2 hours.  - Dangle patient 3 times a day  - Stand patient 3 times a day  - Ambulate patient 3 times a day  - Out of bed to chair 3 times a day   - Out of bed for meals 3 times a day  - Out of bed for toileting  - Record patient progress and toleration of activity level   Outcome: Progressing     Problem: DISCHARGE PLANNING  Goal: Discharge to home or other facility with appropriate resources  Description: INTERVENTIONS:  - Identify barriers to discharge w/patient and caregiver  - Arrange for needed discharge resources and  transportation as appropriate  - Identify discharge learning needs (meds, wound care, etc.)  - Arrange for interpretive services to assist at discharge as needed  - Refer to Case Management Department for coordinating discharge planning if the patient needs post-hospital services based on physician/advanced practitioner order or complex needs related to functional status, cognitive ability, or social support system  Outcome: Progressing     Problem: Knowledge Deficit  Goal: Patient/family/caregiver demonstrates understanding of disease process, treatment plan, medications, and discharge instructions  Description: Complete learning assessment and assess knowledge base.  Interventions:  - Provide teaching at level of understanding  - Provide teaching via preferred learning methods  Outcome: Progressing     Problem: Prexisting or High Potential for Compromised Skin Integrity  Goal: Skin integrity is maintained or improved  Description: INTERVENTIONS:  - Identify patients at risk for skin breakdown  - Assess and monitor skin integrity  - Assess and monitor nutrition and hydration status  - Monitor labs   - Assess for incontinence   - Turn and reposition patient  - Assist with mobility/ambulation  - Relieve pressure over bony prominences  - Avoid friction and shearing  - Provide appropriate hygiene as needed including keeping skin clean and dry  - Evaluate need for skin moisturizer/barrier cream  - Collaborate with interdisciplinary team   - Patient/family teaching  - Consider wound care consult   Outcome: Progressing     Problem: Nutrition/Hydration-ADULT  Goal: Nutrient/Hydration intake appropriate for improving, restoring or maintaining nutritional needs  Description: Monitor and assess patient's nutrition/hydration status for malnutrition. Collaborate with interdisciplinary team and initiate plan and interventions as ordered.  Monitor patient's weight and dietary intake as ordered or per policy. Utilize nutrition  screening tool and intervene as necessary. Determine patient's food preferences and provide high-protein, high-caloric foods as appropriate.     INTERVENTIONS:  - Monitor oral intake, urinary output, labs, and treatment plans  - Assess nutrition and hydration status and recommend course of action  - Evaluate amount of meals eaten  - Assist patient with eating if necessary   - Allow adequate time for meals  - Recommend/ encourage appropriate diets, oral nutritional supplements, and vitamin/mineral supplements  - Order, calculate, and assess calorie counts as needed  - Recommend, monitor, and adjust tube feedings and TPN/PPN based on assessed needs  - Assess need for intravenous fluids  - Provide specific nutrition/hydration education as appropriate  - Include patient/family/caregiver in decisions related to nutrition  Outcome: Progressing

## 2024-01-18 VITALS
HEART RATE: 110 BPM | TEMPERATURE: 98.1 F | DIASTOLIC BLOOD PRESSURE: 90 MMHG | RESPIRATION RATE: 16 BRPM | OXYGEN SATURATION: 96 % | SYSTOLIC BLOOD PRESSURE: 130 MMHG

## 2024-01-18 PROCEDURE — 10330057 MEDICATION, GENERAL

## 2024-01-18 NOTE — UTILIZATION REVIEW
NOTIFICATION OF ADMISSION DISCHARGE   This is a Notification of Discharge from [facility]. Please be advised that this patient has been discharge from our facility. Below you will find the admission and discharge date and time including the patient’s disposition.   UTILIZATION REVIEW CONTACT:  Gloria Murillo  Utilization   Network Utilization Review Department  Phone: [phone] carefully listen to the prompts. All voicemails are confidential.  Email: [email or web address]     ADMISSION INFORMATION  PRESENTATION DATE: 1/12/2024 10:12 PM  OBERVATION ADMISSION DATE:   INPATIENT ADMISSION DATE: 1/13/24 10:28 AM   DISCHARGE DATE: 1/17/2024  9:22 AM   DISPOSITION:Home/Self Care    Network Utilization Review Department  ATTENTION: Please call with any questions or concerns to [phone] and carefully listen to the prompts so that you are directed to the right person. All voicemails are confidential.   For Discharge needs, contact Care Management DC Support Team at [phone] opt. [phone]  Send all requests for admission clinical reviews, approved or denied determinations and any other requests to dedicated fax number below belonging to the campus where the patient is receiving treatment. List of dedicated fax numbers for the Facilities:  FACILITY NAME UR FAX NUMBER   ADMISSION DENIALS (Administrative/Medical Necessity) [phone]   DISCHARGE SUPPORT TEAM ([facility]) [phone]   PARENT CHILD HEALTH (Maternity/NICU/Pediatrics) [phone]   [facility] [phone]   [facility] [phone]   [facility] [phone]   [facility] [phone]   [facility] [phone]   [facility] [phone]   [facility] [phone]   [facility] [phone]    [facility] [phone]   formerly [facility] [phone]   [facility] [phone]

## 2024-01-19 ENCOUNTER — HOME CARE VISIT (OUTPATIENT)
Dept: HOME HOSPICE | Facility: HOSPICE | Age: 84
End: 2024-01-19
Payer: MEDICARE

## 2024-01-22 ENCOUNTER — HOME CARE VISIT (OUTPATIENT)
Dept: HOME HEALTH SERVICES | Facility: HOME HEALTHCARE | Age: 84
End: 2024-01-22
Payer: MEDICARE

## 2024-01-22 PROCEDURE — G0300 HHS/HOSPICE OF LPN EA 15 MIN: HCPCS

## 2024-01-22 PROCEDURE — 10330057 MEDICATION, GENERAL

## 2024-01-23 LAB
DME PARACHUTE DELIVERY DATE ACTUAL: NORMAL
DME PARACHUTE DELIVERY DATE REQUESTED: NORMAL
DME PARACHUTE ITEM DESCRIPTION: NORMAL
DME PARACHUTE ORDER STATUS: NORMAL
DME PARACHUTE SUPPLIER NAME: NORMAL
DME PARACHUTE SUPPLIER PHONE: NORMAL

## 2024-01-23 PROCEDURE — 10330057 MEDICATION, GENERAL

## 2024-01-24 ENCOUNTER — HOME CARE VISIT (OUTPATIENT)
Dept: HOME HOSPICE | Facility: HOSPICE | Age: 84
End: 2024-01-24
Payer: MEDICARE

## 2024-01-25 ENCOUNTER — HOME CARE VISIT (OUTPATIENT)
Dept: HOME HOSPICE | Facility: HOSPICE | Age: 84
End: 2024-01-25
Payer: MEDICARE

## 2024-01-25 ENCOUNTER — HOME CARE VISIT (OUTPATIENT)
Dept: HOME HEALTH SERVICES | Facility: HOME HEALTHCARE | Age: 84
End: 2024-01-25

## 2024-01-25 PROCEDURE — G0299 HHS/HOSPICE OF RN EA 15 MIN: HCPCS

## 2024-01-25 PROCEDURE — 10330064 UNDERPAD, REUSE 36X36 1DZ     BECKCL

## 2024-01-25 PROCEDURE — 10330057 MEDICATION, GENERAL

## 2024-01-25 PROCEDURE — 10330064 BRIEF, WINGS CHOICE XLG (15/BG4BG/CS) KE

## 2024-01-25 NOTE — CASE COMMUNICATION
Ship to . Home  Branch: [place]   Tab Briefs  [id number]          15/pk  2    Underpad, reusable [id number]  [id number]  3

## 2024-01-26 ENCOUNTER — HOME CARE VISIT (OUTPATIENT)
Dept: HOME HEALTH SERVICES | Facility: HOME HEALTHCARE | Age: 84
End: 2024-01-26
Payer: MEDICARE

## 2024-01-26 PROCEDURE — G0156 HHCP-SVS OF AIDE,EA 15 MIN: HCPCS

## 2024-01-28 ENCOUNTER — HOME CARE VISIT (OUTPATIENT)
Dept: HOME HOSPICE | Facility: HOSPICE | Age: 84
End: 2024-01-28
Payer: MEDICARE

## 2024-01-28 DIAGNOSIS — Z51.5 HOSPICE CARE PATIENT: Primary | ICD-10-CM

## 2024-01-28 RX ORDER — OXYCODONE HYDROCHLORIDE 10 MG/1
10 TABLET ORAL EVERY 6 HOURS PRN
Qty: 120 TABLET | Refills: 0 | Status: SHIPPED | OUTPATIENT
Start: 2024-01-28 | End: 2024-02-09

## 2024-01-29 ENCOUNTER — HOME CARE VISIT (OUTPATIENT)
Dept: HOME HEALTH SERVICES | Facility: HOME HEALTHCARE | Age: 84
End: 2024-01-29
Payer: MEDICARE

## 2024-01-29 PROCEDURE — 10330057 MEDICATION, GENERAL

## 2024-01-29 PROCEDURE — G0156 HHCP-SVS OF AIDE,EA 15 MIN: HCPCS

## 2024-01-31 ENCOUNTER — HOME CARE VISIT (OUTPATIENT)
Dept: HOME HEALTH SERVICES | Facility: HOME HEALTHCARE | Age: 84
End: 2024-01-31
Payer: MEDICARE

## 2024-01-31 PROCEDURE — G0156 HHCP-SVS OF AIDE,EA 15 MIN: HCPCS

## 2024-02-01 ENCOUNTER — HOME CARE VISIT (OUTPATIENT)
Dept: HOME HOSPICE | Facility: HOSPICE | Age: 84
End: 2024-02-01
Payer: MEDICARE

## 2024-02-01 PROCEDURE — 10330057 MEDICATION, GENERAL

## 2024-02-02 ENCOUNTER — HOME CARE VISIT (OUTPATIENT)
Dept: HOME HEALTH SERVICES | Facility: HOME HEALTHCARE | Age: 84
End: 2024-02-02
Payer: MEDICARE

## 2024-02-02 PROCEDURE — G0156 HHCP-SVS OF AIDE,EA 15 MIN: HCPCS

## 2024-02-02 PROCEDURE — 10330064 BRIEF, WINGS CHOICE XLG (15/BG4BG/CS) KE

## 2024-02-02 PROCEDURE — 10330064 UNDERPAD, REUSE 36X36 1DZ     BECKCL

## 2024-02-08 PROCEDURE — 10330087 HSPC SERVICE INTENSITY ADD-ON

## 2025-04-26 NOTE — PLAN OF CARE
Problem: Prexisting or High Potential for Compromised Skin Integrity  Goal: Skin integrity is maintained or improved  Description: INTERVENTIONS:  - Identify patients at risk for skin breakdown  - Assess and monitor skin integrity  - Assess and monitor nutrition and hydration status  - Monitor labs   - Assess for incontinence   - Turn and reposition patient  - Assist with mobility/ambulation  - Relieve pressure over bony prominences  - Avoid friction and shearing  - Provide appropriate hygiene as needed including keeping skin clean and dry  - Evaluate need for skin moisturizer/barrier cream  - Collaborate with interdisciplinary team   - Patient/family teaching  - Consider wound care consult   Outcome: Progressing     Problem: PAIN - ADULT  Goal: Verbalizes/displays adequate comfort level or baseline comfort level  Description: Interventions:  - Encourage patient to monitor pain and request assistance  - Assess pain using appropriate pain scale  - Administer analgesics based on type and severity of pain and evaluate response  - Implement non-pharmacological measures as appropriate and evaluate response  - Consider cultural and social influences on pain and pain management  - Notify physician/advanced practitioner if interventions unsuccessful or patient reports new pain  Outcome: Progressing     Problem: INFECTION - ADULT  Goal: Absence or prevention of progression during hospitalization  Description: INTERVENTIONS:  - Assess and monitor for signs and symptoms of infection  - Monitor lab/diagnostic results  - Monitor all insertion sites, i.e. indwelling lines, tubes, and drains  - Monitor endotracheal if appropriate and nasal secretions for changes in amount and color  - [place] appropriate cooling/warming therapies per order  - Administer medications as ordered  - Instruct and encourage patient and family to use good hand hygiene technique  - Identify and instruct in appropriate isolation precautions for  identified infection/condition  Outcome: Progressing  Goal: Absence of fever/infection during neutropenic period  Description: INTERVENTIONS:  - Monitor WBC    Outcome: Progressing     Problem: SAFETY ADULT  Goal: Patient will remain free of falls  Description: INTERVENTIONS:  - Educate patient/family on patient safety including physical limitations  - Instruct patient to call for assistance with activity   - Consult OT/PT to assist with strengthening/mobility   - Keep Call bell within reach  - Keep bed low and locked with side rails adjusted as appropriate  - Keep care items and personal belongings within reach  - Initiate and maintain comfort rounds  - Make Fall Risk Sign visible to staff  - Offer Toileting every 2 Hours, in advance of need  - Initiate/Maintain bed alarm  - Obtain necessary fall risk management equipment:   - Apply yellow socks and bracelet for high fall risk patients  - Consider moving patient to room near nurses station  Outcome: Progressing  Goal: Maintain or return to baseline ADL function  Description: INTERVENTIONS:  -  Assess patient's ability to carry out ADLs; assess patient's baseline for ADL function and identify physical deficits which impact ability to perform ADLs (bathing, care of mouth/teeth, toileting, grooming, dressing, etc.)  - Assess/evaluate cause of self-care deficits   - Assess range of motion  - Assess patient's mobility; develop plan if impaired  - Assess patient's need for assistive devices and provide as appropriate  - Encourage maximum independence but intervene and supervise when necessary  - Involve family in performance of ADLs  - Assess for home care needs following discharge   - Consider OT consult to assist with ADL evaluation and planning for discharge  - Provide patient education as appropriate  Outcome: Progressing  Goal: Maintains/Returns to pre admission functional level  Description: INTERVENTIONS:  - Perform AM-PAC 6 Click Basic Mobility/ Daily Activity  assessment daily.  - Set and communicate daily mobility goal to care team and patient/family/caregiver.   - Collaborate with rehabilitation services on mobility goals if consulted  - Perform Range of Motion 3 times a day.  - Reposition patient every 2 hours.  - Dangle patient 3 times a day  - Stand patient 3 times a day  - Ambulate patient 3 times a day  - Out of bed to chair 3 times a day   - Out of bed for meals 3 times a day  - Out of bed for toileting  - Record patient progress and toleration of activity level   Outcome: Progressing     Problem: DISCHARGE PLANNING  Goal: Discharge to home or other facility with appropriate resources  Description: INTERVENTIONS:  - Identify barriers to discharge w/patient and caregiver  - Arrange for needed discharge resources and transportation as appropriate  - Identify discharge learning needs (meds, wound care, etc.)  - Arrange for interpretive services to assist at discharge as needed  - Refer to Case Management Department for coordinating discharge planning if the patient needs post-hospital services based on physician/advanced practitioner order or complex needs related to functional status, cognitive ability, or social support system  Outcome: Progressing     Problem: Knowledge Deficit  Goal: Patient/family/caregiver demonstrates understanding of disease process, treatment plan, medications, and discharge instructions  Description: Complete learning assessment and assess knowledge base.  Interventions:  - Provide teaching at level of understanding  - Provide teaching via preferred learning methods  Outcome: Progressing      Laceration of right chest wall